# Patient Record
Sex: MALE | Race: WHITE | Employment: OTHER | URBAN - METROPOLITAN AREA
[De-identification: names, ages, dates, MRNs, and addresses within clinical notes are randomized per-mention and may not be internally consistent; named-entity substitution may affect disease eponyms.]

---

## 2017-01-04 ENCOUNTER — ASSISTED LIVING VISIT (OUTPATIENT)
Dept: GERIATRICS | Facility: CLINIC | Age: 77
End: 2017-01-04
Payer: COMMERCIAL

## 2017-01-04 VITALS
RESPIRATION RATE: 18 BRPM | DIASTOLIC BLOOD PRESSURE: 67 MMHG | WEIGHT: 171 LBS | OXYGEN SATURATION: 98 % | TEMPERATURE: 98.5 F | SYSTOLIC BLOOD PRESSURE: 126 MMHG | HEART RATE: 64 BPM

## 2017-01-04 DIAGNOSIS — G30.8 ALZHEIMER'S DISEASE OF OTHER ONSET WITHOUT BEHAVIORAL DISTURBANCE: ICD-10-CM

## 2017-01-04 DIAGNOSIS — Z71.89 ENCOUNTER FOR HOME SAFETY REVIEW FOR INJURY PREVENTION: ICD-10-CM

## 2017-01-04 DIAGNOSIS — E89.0 POSTABLATIVE HYPOTHYROIDISM: ICD-10-CM

## 2017-01-04 DIAGNOSIS — F02.80 ALZHEIMER'S DISEASE OF OTHER ONSET WITHOUT BEHAVIORAL DISTURBANCE: ICD-10-CM

## 2017-01-04 DIAGNOSIS — R41.89 COGNITIVE IMPAIRMENT: Primary | ICD-10-CM

## 2017-01-04 PROCEDURE — 99207 ZZC CDG-CORRECTLY CODED, REVIEWED AND AGREE: CPT | Performed by: NURSE PRACTITIONER

## 2017-01-04 RX ORDER — MULTIVIT-MIN/FA/LYCOPEN/LUTEIN .4-300-25
1 TABLET ORAL AT BEDTIME
COMMUNITY
End: 2017-11-17

## 2017-01-04 NOTE — PROGRESS NOTES
[facility]    Chief Complaint   Patient presents with     RECHECK     Face to Face       HPI:    Elie Marcano is a 76 year old  (1940), who is being seen today for an episodic care visit at [facility]. Today's concern is:  Increased cognitive impairment, home safety  Nurses report increased confusion and concerns for need for memory care. Nurses report that he placed his underwear on a light bulb to dry and his underwear started to burn. Nurses requesting further cognitive evaluation for possible memory care placement.    Alzheimer's disease of other onset without behavioral disturbance  He resides in Assisted Living.He denies sleep disturbances. He has adjusted well to Assisted Living. Nurses completed SLUMS on admission 11/30.    Postablative hypothyroid  He has a history of Graves disease with a multinodular toxic goiter. He was previously treated with Methimazole and clinically euthyroid  He was treated with I-131 therapy by Dr Harris in [place]. He was due for follow up with Endocrinology prior to his move to [place]. Post ablative Hypothyroid. Levothyroxine increased to 100 mcg for continued elevated TSH. TSH has slowly improved. TSH 11.77 12/20/16.    ALLERGIES: Review of patient's allergies indicates no known allergies.  Past Medical, Surgical, Family and Social History reviewed and updated in [facility].    Current Outpatient Prescriptions   Medication Sig Dispense Refill     Multiple Vitamins-Minerals (CEROVITE SENIOR) TABS Take 1 tablet by mouth At Bedtime       levothyroxine (SYNTHROID/LEVOTHROID) 100 MCG tablet Take 1 tablet (100 mcg) by mouth daily 31 tablet PRN     tamsulosin (FLOMAX) 0.4 MG 24 hr capsule Take 1 capsule (0.4 mg) by mouth daily 31 capsule PRN     travoprost, NELSON Free, (TRAVATAN Z) 0.004 % opthalmic solution Place 1 drop into both eyes At Bedtime 1 Bottle 11     vitamin D (ERGOCALCIFEROL) 50906 UNIT capsule TAKE 1 CAPSULE BY MOUTH TWICE WEEKLY 8 capsule  11     donepezil (ARICEPT) 10 MG tablet Take 1 tablet (10 mg) by mouth daily 31 tablet PRN     Medications reviewed:  Medications reconciled to facility chart and changes were made to reflect current medications as identified as above med list. Below are the changes that were made:   Medications stopped since last EPIC medication reconciliation:   There are no discontinued medications.    Medications started since last [facility] medication reconciliation:  No orders of the defined types were placed in this encounter.      REVIEW OF SYSTEMS:  4 point ROS of systems including Respiratory, Cardiovascular, Gastroenterology, Genitourinary, were all negative except for pertinent positives noted in my HPI.    Physical Exam:  /67 mmHg  Pulse 64  Temp(Src) 98.5  F (36.9  C)  Resp 18  Wt 171 lb (77.565 kg)  SpO2 98%  GENERAL APPEARANCE:  Alert, in no distress  RESP:  respiratory effort and palpation of chest normal, lungs clear to auscultation , no respiratory distress  CV:  Palpation and auscultation of heart done , regular rate and rhythm, no murmur, rub, or gallop, no edema.  M/S:   Gait and station normal, ambulating independently.  SKIN:  Inspection of skin and subcutaneous tissue baseline, Palpation of skin and subcutaneous tissue baseline.  PSYCH:  memory impaired , affect and mood normal    Recent Labs:      Last Basic Metabolic Panel:  Recent Labs   Lab Test 11/01/16   NA  136  136   POTASSIUM  3.6  3.6   CHLORIDE  100  100   RONALD  8.5  8.5   BUN  20  20   CR  0.91  0.91   GLC  87  87       Liver Function Studies -   Recent Labs   Lab Test 11/03/16   PROTTOTAL  6.3*   ALBUMIN  3.0  3.0*   BILITOTAL  0.5  0.5   ALKPHOS  68  68   AST  19  19   ALT  29  29       TSH   Date Value Ref Range Status   12/20/2016 11.77* 0.40 - 4.00 mU/L Final   12/01/2016 17.26* 0.40 - 4.00 mcU/mL Final         Assessment/Plan:  Increased cognitive impairment with concerns for home safety  Refer Home health services for  PT/OT for cognitive evaluation. Consider memory care unit.    Alzheimer's disease of other onset without behavioral disturbance  Resides in Assisted living continue with Donepezil. Continue with medication management and meals.    Postablative hypothyroid   Continue Levothyroxine 100 mcg daily. Follow up TSH.           Orders:  Recheck TSH in one month    Electronically signed by  STAS Lott CNP          Documentation of Face-to-Face and Certification for Home Health Services     Patient: Elie Marcano   YOB: 1940  MR Number: [id number]  Today's Date: 1/4/2017    I certify that patient: Elie Marcano is under my care and that I, or a nurse practitioner or physician's assistant working with me, had a face-to-face encounter that meets the physician face-to-face encounter requirements with this patient on: 1/4/2017.    This encounter with the patient was in whole, or in part, for the following medical condition, which is the primary reason for home health care: RN, OT for Cognitive assessment..    I certify that, based on my findings, the following services are medically necessary home health services: Physical therapy and Occupational Therapy.    My clinical findings support the need for the above services because: Physical Therapy Services are needed to assess and treat the following functional impairments: cognition and assistance with ADL's.    Further, I certify that my clinical findings support that this patient is homebound (i.e. absences from home require considerable and taxing effort and are for medical reasons or [employer] services or infrequently or of short duration when for other reasons) because: Requires assistance of another person or specialized equipment to access medical services because patient: Is prone to wander/get lost without assistance...    Based on the above findings. I certify that this patient is confined to the home and needs intermittent skilled nursing  care, physical therapy and/or speech therapy.  The patient is under my care, and I have initiated the establishment of the plan of care.  This patient will be followed by a physician who will periodically review the plan of care.  Physician/Provider to provide follow up care: Nickie Iverson    Responsible Medicare certified PECOS Physician:   Physician Signature: See electronic signature associated with these discharge orders.  Date: 1/4/2017

## 2017-01-24 ENCOUNTER — TRANSFERRED RECORDS (OUTPATIENT)
Dept: HEALTH INFORMATION MANAGEMENT | Facility: CLINIC | Age: 77
End: 2017-01-24

## 2017-01-24 LAB — TSH SERPL-ACNC: 0.5 MU/L (ref 0.4–4)

## 2017-02-02 ENCOUNTER — TRANSFERRED RECORDS (OUTPATIENT)
Dept: HEALTH INFORMATION MANAGEMENT | Facility: CLINIC | Age: 77
End: 2017-02-02

## 2017-02-02 LAB — TSH SERPL-ACNC: 0.44 MU/L (ref 0.4–4)

## 2017-03-08 ENCOUNTER — ASSISTED LIVING VISIT (OUTPATIENT)
Dept: GERIATRICS | Facility: CLINIC | Age: 77
End: 2017-03-08
Payer: COMMERCIAL

## 2017-03-08 VITALS
DIASTOLIC BLOOD PRESSURE: 77 MMHG | OXYGEN SATURATION: 97 % | HEART RATE: 71 BPM | RESPIRATION RATE: 20 BRPM | SYSTOLIC BLOOD PRESSURE: 148 MMHG | WEIGHT: 170.4 LBS

## 2017-03-08 DIAGNOSIS — G30.8 ALZHEIMER'S DISEASE OF OTHER ONSET WITHOUT BEHAVIORAL DISTURBANCE: Primary | ICD-10-CM

## 2017-03-08 DIAGNOSIS — F02.80 ALZHEIMER'S DISEASE OF OTHER ONSET WITHOUT BEHAVIORAL DISTURBANCE: Primary | ICD-10-CM

## 2017-03-08 DIAGNOSIS — E89.0 POSTABLATIVE HYPOTHYROIDISM: ICD-10-CM

## 2017-03-08 DIAGNOSIS — E55.9 VITAMIN D DEFICIENCY: ICD-10-CM

## 2017-03-08 PROCEDURE — 99207 ZZC CDG-DOWN CODE MED NECESSITY: CPT | Performed by: NURSE PRACTITIONER

## 2017-03-08 NOTE — PROGRESS NOTES
[facility]    Chief Complaint   Patient presents with     RECHECK     Routine        HPI:    Elie Marcano is a 76 year old  (1940), who is being seen today for an episodic care visit at [facility]. Today's concern is:  Alzheimer's disease of other onset without behavioral disturbance  He underwent a comprehensive cognitive assessment with OT due to increased confusion with functional and cognitive decline. CPT 3.9/5.6 OT indicating moderate cognitive decline, requiring 24 hour care. He denies sleep disturbances. He has been moved to memory care He has adjusted well to Memory care. Nurses completed SLUMS on admission 11/30. He requires additional assistance with hygiene.    Postablative hypothyroid  He has a history of Graves disease with a multinodular toxic goiter. He was previously treated with Methimazole and clinically euthyroid  He was treated with I-131 therapy by Dr Harris in [place]. He was due for follow up with Endocrinology prior to his move to [place]. Post ablative Hypothyroid. Levothyroxine increased to 150 mcg daily. Last TSH 0.44 2/2/17.    Vitamin D deficiency  He has a history of vitamin D deficiency. He is currently managed on Ergocalciferol 50,000 iu po twice weekly. Last Vitamin D level 35 10/20/16.    ALLERGIES: Review of patient's allergies indicates no known allergies.  Past Medical, Surgical, Family and Social History reviewed and updated in [facility].    Current Outpatient Prescriptions   Medication Sig Dispense Refill     LEVOTHYROXINE SODIUM PO Take 150 mcg by mouth daily In evening 8pm       Multiple Vitamins-Minerals (CEROVITE SENIOR) TABS Take 1 tablet by mouth At Bedtime       tamsulosin (FLOMAX) 0.4 MG 24 hr capsule Take 1 capsule (0.4 mg) by mouth daily 31 capsule PRN     travoprost, NELSON Free, (TRAVATAN Z) 0.004 % opthalmic solution Place 1 drop into both eyes At Bedtime 1 Bottle 11     vitamin D (ERGOCALCIFEROL) 90367 UNIT capsule TAKE 1 CAPSULE BY  MOUTH TWICE WEEKLY 8 capsule 11     donepezil (ARICEPT) 10 MG tablet Take 1 tablet (10 mg) by mouth daily 31 tablet PRN     Medications reviewed:  Medications reconciled to facility chart and changes were made to reflect current medications as identified as above med list. Below are the changes that were made:   Medications stopped since last EPIC medication reconciliation:   There are no discontinued medications.    Medications started since last [facility] medication reconciliation:  No orders of the defined types were placed in this encounter.      REVIEW OF SYSTEMS:  4 point ROS of systems including Respiratory, Cardiovascular, Gastroenterology, Genitourinary, were all negative except for pertinent positives noted in my HPI.    Physical Exam:  /77  Pulse 71  Resp 20  Wt 170 lb 6.4 oz (77.3 kg)  SpO2 97%  GENERAL APPEARANCE:  Alert, in no distress  RESP:  respiratory effort and palpation of chest normal, lungs clear to auscultation , no respiratory distress  CV:  Palpation and auscultation of heart done , regular rate and rhythm, no murmur, rub, or gallop, no edema.  M/S:   Gait and station normal, ambulating independently.  SKIN:  Inspection of skin and subcutaneous tissue baseline, Palpation of skin and subcutaneous tissue baseline.  PSYCH:  memory impaired , affect and mood normal    Recent Labs:      Last Basic Metabolic Panel:  Recent Labs   Lab Test 11/01/16   NA  136  136   POTASSIUM  3.6  3.6   CHLORIDE  100  100   RONALD  8.5  8.5   BUN  20  20   CR  0.91  0.91   GLC  87  87       Liver Function Studies -   Recent Labs   Lab Test 11/03/16   PROTTOTAL  6.3*   ALBUMIN  3.0  3.0*   BILITOTAL  0.5  0.5   ALKPHOS  68  68   AST  19  19   ALT  29  29       TSH   Date Value Ref Range Status   02/02/2017 0.44 0.40 - 4.00 mU/L Final   01/24/2017 0.50 0.40 - 4.00 mU/L Final         Assessment/Plan:  Alzheimer's disease of other onset without behavioral disturbance  Resides in Memory care. Continue with  Donepezil. Continue with medication management and meals.    Postablative hypothyroid with history of Graves disease   Continue Levothyroxine 150 mcg daily.  Continue to follow TSH.    Vitamin D Deficiency  Check vitamin D level, Continue Ergocalciferol 50,000 iu twice weekly.       POLST on file DNR/DNI    Electronically signed by  STAS Lott CNP

## 2017-03-30 ENCOUNTER — TRANSFERRED RECORDS (OUTPATIENT)
Dept: HEALTH INFORMATION MANAGEMENT | Facility: CLINIC | Age: 77
End: 2017-03-30

## 2017-04-19 ENCOUNTER — ASSISTED LIVING VISIT (OUTPATIENT)
Dept: GERIATRICS | Facility: CLINIC | Age: 77
End: 2017-04-19
Payer: COMMERCIAL

## 2017-04-19 VITALS
TEMPERATURE: 97.2 F | RESPIRATION RATE: 21 BRPM | OXYGEN SATURATION: 98 % | WEIGHT: 170.8 LBS | SYSTOLIC BLOOD PRESSURE: 138 MMHG | DIASTOLIC BLOOD PRESSURE: 72 MMHG | HEART RATE: 57 BPM

## 2017-04-19 DIAGNOSIS — N40.0 BENIGN PROSTATIC HYPERPLASIA WITHOUT LOWER URINARY TRACT SYMPTOMS, UNSPECIFIED MORPHOLOGY: ICD-10-CM

## 2017-04-19 DIAGNOSIS — E55.9 VITAMIN D DEFICIENCY: ICD-10-CM

## 2017-04-19 DIAGNOSIS — G30.8 ALZHEIMER'S DISEASE OF OTHER ONSET WITHOUT BEHAVIORAL DISTURBANCE: ICD-10-CM

## 2017-04-19 DIAGNOSIS — F02.80 ALZHEIMER'S DISEASE OF OTHER ONSET WITHOUT BEHAVIORAL DISTURBANCE: ICD-10-CM

## 2017-04-19 DIAGNOSIS — I10 BENIGN ESSENTIAL HYPERTENSION: ICD-10-CM

## 2017-04-19 DIAGNOSIS — E89.0 POSTABLATIVE HYPOTHYROIDISM: Primary | ICD-10-CM

## 2017-04-19 PROCEDURE — 99207 ZZC CDG-CORRECTLY CODED, REVIEWED AND AGREE: CPT | Performed by: INTERNAL MEDICINE

## 2017-04-19 RX ORDER — LATANOPROST 50 UG/ML
1 SOLUTION/ DROPS OPHTHALMIC AT BEDTIME
COMMUNITY
End: 2018-05-29

## 2017-04-19 NOTE — PROGRESS NOTES
"Elie Marcano is a 76 year old male seen April 19, 2017 at [facility] unit where he has resided for 9 months (admit 7/2016) seen to follow up worsening dementia, now moved from [place] to Memory Care.   Patient is seen on the unit, pleasant and conversational.   States \"there is nothing wrong with me\"    Denies pain, eats/sleeps well.   No GI symptoms.       Patient has a h/o Graves' disease with toxic multinodular goiter, treated with methimazole and I-131 tx 5/2016 in [place].   Patient reports he sleeps okay.  No dizziness.  He has gained weight since AL admission.      Patient was worked up for weight loss, recently found to have mild elevation of transaminases   He denies any GI symptoms, no pain.    Also followed for cognitive decline and is on donepezil.  Patient doesn't feel he has much trouble with his memory, even though he is unable to answer most questions regarding his history.    He has moved to the Memory Care unit and seems to be doing well there.        Past Medical History   Diagnosis Date     Hypertension      Cognitive impairment      Vitamin D deficiency      Alzheimer disease      Hyperlipidemia      BPH (benign prostatic hyperplasia)      Elevated PSA      Syncope      Thyroid disease      Graves disease, Multinodular goiter  S/p I-131 ablation, 2016     Weight loss      Chronic sinusitis        SH:  Single, no children.   His friend Shayla Mitchell is his primary contact  Patient reports he was raised [place] farm in [place], has one brother.   Worked as a .     ROS: limited but negative other than above.     EXAM:  Pleasant, NAD  /72  Pulse 57  Temp 97.2  F (36.2  C)  Resp 21  Wt 170 lb 12.8 oz (77.5 kg)  SpO2 98%   Neck supple without adenopathy  Lungs clear bilaterally with fair air movement.   Heart RRR s1s2 without ectopy, 1/6 APRIL  Abd soft, NT, no distention, +BS  Ext without edema  Neuro: ambulatory without device, no focal deficits, no tremor or " stiffness  Psych: affect okay    TSH   Date Value Ref Range Status   02/02/2017 0.44 0.40 - 4.00 mU/L Final     Lab Results   Component Value Date    AST 19 11/03/2016    AST 19 11/03/2016     Lab Results   Component Value Date    ALT 29 11/03/2016    ALT 29 11/03/2016     Lab Results   Component Value Date    BILITOTAL 0.5 11/03/2016    BILITOTAL 0.5 11/03/2016     Lab Results   Component Value Date    ALBUMIN 3.0 11/03/2016    ALBUMIN 3.0 11/03/2016     Lab Results   Component Value Date    PROTTOTAL 6.3 11/03/2016      Lab Results   Component Value Date    ALKPHOS 68 11/03/2016    ALKPHOS 68 11/03/2016   Last Basic Metabolic Panel:  Lab Results   Component Value Date     11/01/2016     11/01/2016      Lab Results   Component Value Date    POTASSIUM 3.6 11/01/2016    POTASSIUM 3.6 11/01/2016     Lab Results   Component Value Date    CHLORIDE 100 11/01/2016    CHLORIDE 100 11/01/2016     Lab Results   Component Value Date    RONALD 8.5 11/01/2016    RONALD 8.5 11/01/2016     No results found for: CO2  Lab Results   Component Value Date    BUN 20 11/01/2016    BUN 20 11/01/2016     Lab Results   Component Value Date    CR 0.91 11/01/2016    CR 0.91 11/01/2016     Lab Results   Component Value Date    GLC 87 11/01/2016    GLC 87 11/01/2016   Vit D level 35      IMP/PLAN:  (E89.0) Postablative hypothyroidism   Comment: s/p I-131 tx in May 2016  Plan: Levothyroxine started for high TSH, now very low.   Will decrease levothyroxine to 0.125 mg/day and follow TSH       (G30.8,  F02.80) Alzheimer's disease of other onset without behavioral disturbance  Comment: Disoriented, loss of functional status.   Plan: continue donepezil, no reported SEs.   [facility] Memory Care support for assist with meds, meals, activity    (I10) Benign essential hypertension  Comment: bps remain good after d/c of atenolol  Plan:  follow    (N40.0) Benign prostatic hyperplasia without lower urinary tract symptoms, unspecified morphology  Comment:  no current sx  Plan: continue Flomax, follow     (E55.9) Vitamin D deficiency  Comment: now on replacement  Plan: same regimen    (R74.0) Elevation of level of transaminase or lactic acid dehydrogenase (LDH)  Comment: resolved     Sharri Dennis MD

## 2017-04-26 DIAGNOSIS — E03.9 HYPOTHYROIDISM, UNSPECIFIED TYPE: Primary | ICD-10-CM

## 2017-04-28 RX ORDER — LEVOTHYROXINE SODIUM 125 UG/1
125 TABLET ORAL DAILY
Qty: 31 TABLET | Status: SHIPPED | OUTPATIENT
Start: 2017-04-28 | End: 2017-06-21

## 2017-05-10 ENCOUNTER — ASSISTED LIVING VISIT (OUTPATIENT)
Dept: GERIATRICS | Facility: CLINIC | Age: 77
End: 2017-05-10
Payer: COMMERCIAL

## 2017-05-10 VITALS
TEMPERATURE: 96.6 F | SYSTOLIC BLOOD PRESSURE: 131 MMHG | WEIGHT: 169.8 LBS | RESPIRATION RATE: 20 BRPM | DIASTOLIC BLOOD PRESSURE: 70 MMHG | OXYGEN SATURATION: 98 % | HEART RATE: 62 BPM

## 2017-05-10 DIAGNOSIS — E89.0 POSTABLATIVE HYPOTHYROIDISM: ICD-10-CM

## 2017-05-10 DIAGNOSIS — N40.0 BENIGN PROSTATIC HYPERPLASIA WITHOUT LOWER URINARY TRACT SYMPTOMS, UNSPECIFIED MORPHOLOGY: ICD-10-CM

## 2017-05-10 DIAGNOSIS — J30.2 SEASONAL ALLERGIC RHINITIS, UNSPECIFIED ALLERGIC RHINITIS TRIGGER: Primary | ICD-10-CM

## 2017-05-10 DIAGNOSIS — F02.80 ALZHEIMER'S DISEASE OF OTHER ONSET WITHOUT BEHAVIORAL DISTURBANCE: ICD-10-CM

## 2017-05-10 DIAGNOSIS — E55.9 VITAMIN D DEFICIENCY: ICD-10-CM

## 2017-05-10 DIAGNOSIS — G30.8 ALZHEIMER'S DISEASE OF OTHER ONSET WITHOUT BEHAVIORAL DISTURBANCE: ICD-10-CM

## 2017-05-10 DIAGNOSIS — I10 BENIGN ESSENTIAL HYPERTENSION: ICD-10-CM

## 2017-05-10 PROCEDURE — 99207 ZZC CDG-CORRECTLY CODED, REVIEWED AND AGREE: CPT | Performed by: NURSE PRACTITIONER

## 2017-05-10 NOTE — PROGRESS NOTES
"[facility]    Chief Complaint   Patient presents with     RECHECK     ROUTINE       HPI:    Elie Marcano is a 76 year old  (1940) male with Alzheimer's Dementia, who is being seen today for an episodic care visit at [facility]. Has lived in [place] since July 2016, he moved from [place].     Meaningful hx from resident is limited due to cognitive impairment. Staff assist in providing history.    Today's concerns care:    (J30.2) Seasonal allergic rhinitis  Complains of stuffy and runny nose. Started recently. Thinks he may have taken allergy medication in the past. No itchy or watery eyes.     Alzheimer's disease of other onset without behavioral disturbance  CPT 3.9/5.6 OT, SLUMS on admission 11/30. Currently maintained on donepezil 10 mg daily. Moved to memory care due cognitive and functional decline. Needs assist for ADLs and medications.No apparent neuropsychiatric symptoms, mood stable. Ambulates freely on unit without assistive device. Weight stable 170#. Feels his memory is \"pretty good\". Feels spirits at \"good\" and sleeps well at night.    Postablative hypothyroid  History of Graves Disease with a multinodular toxic goiter. Previously treated with methimazole and I-131 therapy prior to moving to [place] (May 2016). Endocrinologist in [place] was Dr. Harris. Currently on Levothyroxine 125 mcg daily since 4/27/17.    (I10) Hypertension  Atenolol was stopped due to low blood pressure.  BP and HR last two visit reviewed: 138/72, HR 57; 148/77, 71.  Denies chest pain, palpitations, dizziness, headache.    (N40.0) BPH  No dysuria. Feels he is emptying bladder. Endorses leaking urine at times \"seldom\". Currently on Flomax.    (E55.9) Vitamin D deficiency  Last Vitamin D level 35 10/20/16.  Current medication: Ergocalciferol 50,000 iu PO twice weekly.    ALLERGIES: Review of patient's allergies indicates no known allergies.  Past Medical, Surgical, Family and " Social History reviewed and updated in [facility].    Current Outpatient Prescriptions   Medication Sig Dispense Refill     FLUTICASONE PROPIONATE, NASAL, NA Spray 2 sprays in nostril daily X 1 week, then 1 spray daily. For Rhinitis       levothyroxine (SYNTHROID/LEVOTHROID) 125 MCG tablet Take 1 tablet (125 mcg) by mouth daily 31 tablet PRN     latanoprost (XALATAN) 0.005 % ophthalmic solution Place 1 drop into both eyes At Bedtime       Multiple Vitamins-Minerals (CEROVITE SENIOR) TABS Take 1 tablet by mouth At Bedtime       tamsulosin (FLOMAX) 0.4 MG 24 hr capsule Take 1 capsule (0.4 mg) by mouth daily 31 capsule PRN     vitamin D (ERGOCALCIFEROL) 90082 UNIT capsule TAKE 1 CAPSULE BY MOUTH TWICE WEEKLY 8 capsule 11     donepezil (ARICEPT) 10 MG tablet Take 1 tablet (10 mg) by mouth daily 31 tablet PRN     Medications reviewed:  Medications reconciled to facility chart and changes were made to reflect current medications as identified as above med list. Below are the changes that were made:   Medications stopped since last EPIC medication reconciliation:   There are no discontinued medications.    Medications started since last [facility] medication reconciliation:  No orders of the defined types were placed in this encounter.    Social Hx: Names POA as friend Shayla Mitchell. Describes time in [employer], was not overseas stationed in [place].    REVIEW OF SYSTEMS:  4 point ROS of systems including Respiratory, Cardiovascular, Gastroenterology, Genitourinary, were all negative except for pertinent positives noted in my HPI.    Physical Exam:  /70  Pulse 62  Temp 96.6  F (35.9  C)  Resp 20  Wt 169 lb 12.8 oz (77 kg)  SpO2 98%  GENERAL APPEARANCE:  Alert, in no distress, clean and well dressed in button down shirt  HEENT: good dentition, MMM w/o exudate, conjunctiva w/o injection, no drainage  RESP:  respiratory effort and palpation of chest normal, lungs clear to auscultation , no respiratory distress  CV:  Palpation and  auscultation of heart done , regular rate and rhythm, no murmur, rub, or gallop  PV: no edema, calves are supple and non-tender  GI: abd is soft, non-tender, non-distended, + BS  M/S:   Gait and station normal, ambulating independently without assistive device. 5/5 biceps strength.   SKIN:  No visible rashes, lesions or ulcerations. Skin without increased warmth or tenderness.  Neuro: CN II-XII grossly intact, no tremor, normal tone  PSYCH:  memory impaired, oriented to self only, speech fluent, judgment impaired, affect and mood normal    Recent Labs:      Last Basic Metabolic Panel:  Recent Labs   Lab Test 11/01/16   NA  136  136   POTASSIUM  3.6  3.6   CHLORIDE  100  100   RONALD  8.5  8.5   BUN  20  20   CR  0.91  0.91   GLC  87  87       Liver Function Studies -   Recent Labs   Lab Test 11/03/16   PROTTOTAL  6.3*   ALBUMIN  3.0  3.0*   BILITOTAL  0.5  0.5   ALKPHOS  68  68   AST  19  19   ALT  29  29       TSH   Date Value Ref Range Status   02/02/2017 0.44 0.40 - 4.00 mU/L Final   01/24/2017 0.50 0.40 - 4.00 mU/L Final     Vitamin D level 35    Assessment/Plan:    (J30.2) Seasonal allergic rhinitis  Comment: c/o runny nose, started recently with season change  Plan:   - Order Flonase BID x1 week then daily   - reassess symptoms at next visit     Alzheimer's disease of other onset without behavioral disturbance  Comment: SLUMS 11/30. Now on memory care unit due to functional and cognitive decline. Expect progressive decline as per disease process.  Plan:   - continue supportive care in current environment for medication administration, meals, ADL assistance, socialization, safety.  - Continue donepezil, monitor for adverse effects - none at present    Postablative hypothyroid  Comment: Graves Diease, Treated with methimazole and I-131 in May 2016. Last TSH 0.44 on 2/2/17.  Plan:   - synthroid was decreased on 4/27/17 to 0.125 mcg/day   - order TSH six weeks post dose change on 06/08/17     (I10)  Hypertension  Comment: atenolol stopped, BP today 131/70  Plan:   - BP at goal for age off medications  - continue interval monitoring     (N40.0) BPH  Comment: no symptoms on Flomax  Plan:  - continue Flomax and monitor clinically     (E55.9) Vitamin D deficiency  Comment: On 50,000 iu po twice weekly. Last Vitamin D level 35 10/20/16.  Plan:   - consider lowing dose to maintainance dosing in next few months    Electronically signed by  STAS Ziegler, CNP

## 2017-05-18 ENCOUNTER — TRANSFERRED RECORDS (OUTPATIENT)
Dept: HEALTH INFORMATION MANAGEMENT | Facility: CLINIC | Age: 77
End: 2017-05-18

## 2017-05-18 LAB — TSH SERPL-ACNC: 0.42 MU/L (ref 0.4–4)

## 2017-06-08 ENCOUNTER — TRANSFERRED RECORDS (OUTPATIENT)
Dept: HEALTH INFORMATION MANAGEMENT | Facility: CLINIC | Age: 77
End: 2017-06-08

## 2017-06-08 LAB — TSH SERPL-ACNC: 0.71 MU/L (ref 0.4–4)

## 2017-06-21 ENCOUNTER — ASSISTED LIVING VISIT (OUTPATIENT)
Dept: GERIATRICS | Facility: CLINIC | Age: 77
End: 2017-06-21
Payer: COMMERCIAL

## 2017-06-21 VITALS
TEMPERATURE: 97 F | SYSTOLIC BLOOD PRESSURE: 118 MMHG | DIASTOLIC BLOOD PRESSURE: 67 MMHG | OXYGEN SATURATION: 99 % | RESPIRATION RATE: 19 BRPM | WEIGHT: 173.4 LBS | HEART RATE: 59 BPM

## 2017-06-21 DIAGNOSIS — E55.9 VITAMIN D DEFICIENCY: ICD-10-CM

## 2017-06-21 DIAGNOSIS — G30.8 ALZHEIMER'S DISEASE OF OTHER ONSET WITHOUT BEHAVIORAL DISTURBANCE: ICD-10-CM

## 2017-06-21 DIAGNOSIS — E89.0 POSTABLATIVE HYPOTHYROIDISM: ICD-10-CM

## 2017-06-21 DIAGNOSIS — R21 RASH: Primary | ICD-10-CM

## 2017-06-21 DIAGNOSIS — F02.80 ALZHEIMER'S DISEASE OF OTHER ONSET WITHOUT BEHAVIORAL DISTURBANCE: ICD-10-CM

## 2017-06-21 NOTE — PROGRESS NOTES
[place] [facility]    Chief Complaint   Patient presents with     RECHECK     Rash       HPI:    Elie Marcano is a 76 year old  (1940), who is being seen today for an episodic care visit at [facility].  HPI information obtained from: facility staff and [facility] chart review.Today's concern is:  Rash  Nurses requesting a visit today for a rash to chest. He denies itching or discomfort.    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 He was previously treated with Methimazole and 1-131 therapy prior to moving to [place] in May of 2016. He was followed by an Endocrinologist while living in [place].He is currently managed on Levothyroxine 112 mcg daily. He dose was decreased from 125 mcg daily. He denies fatigue.    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 He resides in memory care. No reports of mood or behavior disturbances by nursing staff.He is currently managed on Donepezil 10 mg daily. No sleep disturbances reported. SLUMS on admission was 11/30. Nurses report good appetite with no weight loss. He ambulates independently.    Vitamin D deficiency  History of vitamin D deficiency He is currently managed on Ergocalciferol 50,000 iu twice weekly. His previous Vtamin D level 10/20/16 was 35.      ALLERGIES: Review of patient's allergies indicates no known allergies.  Past Medical, Surgical, Family and Social History reviewed and updated in [facility].    Current Outpatient Prescriptions   Medication Sig Dispense Refill     LEVOTHYROXINE SODIUM PO Take 112 mcg by mouth At Bedtime       FLUTICASONE PROPIONATE, NASAL, NA Spray 1 spray in nostril daily 1 spray daily. For Rhinitis       latanoprost (XALATAN) 0.005 % ophthalmic solution Place 1 drop into both eyes At Bedtime       Multiple Vitamins-Minerals (CEROVITE SENIOR) TABS Take 1 tablet by mouth At Bedtime       tamsulosin (FLOMAX) 0.4 MG 24 hr  capsule Take 1 capsule (0.4 mg) by mouth daily 31 capsule PRN     vitamin D (ERGOCALCIFEROL) 46990 UNIT capsule TAKE 1 CAPSULE BY MOUTH TWICE WEEKLY 8 capsule 11     donepezil (ARICEPT) 10 MG tablet Take 1 tablet (10 mg) by mouth daily 31 tablet PRN     Medications reviewed:  Medications reconciled to facility chart and changes were made to reflect current medications as identified as above med list. Below are the changes that were made:   Medications stopped since last EPIC medication reconciliation:   Medications Discontinued During This Encounter   Medication Reason     levothyroxine (SYNTHROID/LEVOTHROID) 125 MCG tablet Medication Reconciliation Clean Up       Medications started since last [facility] medication reconciliation:  Orders Placed This Encounter   Medications     LEVOTHYROXINE SODIUM PO     Sig: Take 112 mcg by mouth At Bedtime       REVIEW OF SYSTEMS:  10 point ROS of systems including Constitutional, Eyes, Respiratory, Cardiovascular, Gastroenterology, Genitourinary, Integumentary, Muscularskeletal, Psychiatric were all negative except for pertinent positives noted in my HPI.    Physical Exam:  /67  Pulse 59  Temp 97  F (36.1  C)  Resp 19  Wt 173 lb 6.4 oz (78.7 kg)  SpO2 99%  GENERAL APPEARANCE:  Alert, in no distress  RESP:  respiratory effort and palpation of chest normal, lungs clear to auscultation , no respiratory distress  CV:  Palpation and auscultation of heart done , regular rate and rhythm, no murmur, rub, or gallop, no edema  ABDOMEN:  normal bowel sounds, soft, nontender, no hepatosplenomegaly or other masses  M/S:   Gait and station normal  SKIN:  Inspection of skin and subcutaneous tissuefaint erythematous papualar rash to anterior rash  PSYCH:  memory impaired , affect and mood normal    Recent Labs:    CBC RESULTS: No results for input(s): WBC, RBC, HGB, HCT, MCV, MCH, MCHC, RDW, PLT in the last 26095 hours.    Last Basic Metabolic Panel:  Recent Labs   Lab Test 11/01/16   NA   136  136   POTASSIUM  3.6  3.6   CHLORIDE  100  100   RONALD  8.5  8.5   BUN  20  20   CR  0.91  0.91   GLC  87  87       Liver Function Studies -   Recent Labs   Lab Test 11/03/16   PROTTOTAL  6.3*   ALBUMIN  3.0  3.0*   BILITOTAL  0.5  0.5   ALKPHOS  68  68   AST  19  19   ALT  29  29       TSH   Date Value Ref Range Status   06/08/2017 0.71 0.40 - 4.00 mU/L Final   05/18/2017 0.42 0.40 - 4.00 mU/L Final         Assessment/Plan:  Rash  Sarna lotion apply to rash BID until cleared    Postablative hypothyroidism  Decrease Levothyroxine to 112 mcg daily. 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 Continue Donepizil.    Vitamin D deficiency  Continue Ergocalciferol, recheck Vitamin D level      Orders:  Labs in 6 weeks, TSH,BMP,CBC, Vitamin D level    POLST on file DNR/DNI    Electronically signed by  Nickie Iverson NP

## 2017-07-12 ENCOUNTER — ASSISTED LIVING VISIT (OUTPATIENT)
Dept: GERIATRICS | Facility: CLINIC | Age: 77
End: 2017-07-12
Payer: COMMERCIAL

## 2017-07-12 VITALS
DIASTOLIC BLOOD PRESSURE: 73 MMHG | HEART RATE: 66 BPM | SYSTOLIC BLOOD PRESSURE: 173 MMHG | TEMPERATURE: 96.6 F | OXYGEN SATURATION: 99 % | WEIGHT: 164 LBS | RESPIRATION RATE: 20 BRPM

## 2017-07-12 DIAGNOSIS — F02.818 LATE ONSET ALZHEIMER'S DISEASE WITH BEHAVIORAL DISTURBANCE (H): ICD-10-CM

## 2017-07-12 DIAGNOSIS — E89.0 POSTABLATIVE HYPOTHYROIDISM: ICD-10-CM

## 2017-07-12 DIAGNOSIS — I10 BENIGN ESSENTIAL HYPERTENSION: Primary | ICD-10-CM

## 2017-07-12 DIAGNOSIS — G30.1 LATE ONSET ALZHEIMER'S DISEASE WITH BEHAVIORAL DISTURBANCE (H): ICD-10-CM

## 2017-07-12 DIAGNOSIS — E55.9 VITAMIN D DEFICIENCY: ICD-10-CM

## 2017-07-12 PROCEDURE — 99207 ZZC CDG-CORRECTLY CODED, REVIEWED AND AGREE: CPT | Performed by: NURSE PRACTITIONER

## 2017-07-12 NOTE — PROGRESS NOTES
[place] [facility]    Chief Complaint   Patient presents with     RECHECK     Routine -Elevated BP       HPI:    Elie Marcano is a 76 year old  (1940), who is being seen today for an episodic care visit at [facility].  HPI information obtained from: facility staff and [facility] chart review.Today's concern is:    Essential Hypertension  Nurses requesting a visit for elevated BP. Last two Blood pressures 157/73 and 173/73. He denies chest pain or shortness of breath.    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 He was previously treated with Methimazole and 1-131 therapy prior to moving to [place] in May of 2016. He was followed by an Endocrinologist while living in [place].He is currently managed on Levothyroxine 100 mcg daily. He dose was decreased from 125 mcg daily. He denies fatigue.    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 He resides in memory care. Nurses report that he has made aggressive and threatening behavior toward staff. He has been redirected without escalation of behavior.He is currently managed on Donepezil 10 mg daily. No sleep disturbances reported. SLUMS on admission was 11/30. SLUMS recently completed in one year follow up by nursing staff 7/30.He has lost 7 lbs in the past 9 months. He ambulates independently.    Vitamin D deficiency  History of vitamin D deficiency He is currently managed on Ergocalciferol 50,000 iu twice weekly. His previous Vtamin D level 10/20/16 was 35.       ALLERGIES: Review of patient's allergies indicates no known allergies.  Past Medical, Surgical, Family and Social History reviewed and updated in [facility].    Current Outpatient Prescriptions   Medication Sig Dispense Refill     LEVOTHYROXINE SODIUM PO Take 100 mcg by mouth At Bedtime        camphor-menthol (SARNA) 0.5-0.5 % LOTN Apply topically 2 times daily Until rash is cleared, then d/c.        FLUTICASONE PROPIONATE, NASAL, NA Spray 1 spray in nostril daily 1 spray daily. For Rhinitis       latanoprost (XALATAN) 0.005 % ophthalmic solution Place 1 drop into both eyes At Bedtime       Multiple Vitamins-Minerals (CEROVITE SENIOR) TABS Take 1 tablet by mouth At Bedtime       tamsulosin (FLOMAX) 0.4 MG 24 hr capsule Take 1 capsule (0.4 mg) by mouth daily 31 capsule PRN     vitamin D (ERGOCALCIFEROL) 73496 UNIT capsule TAKE 1 CAPSULE BY MOUTH TWICE WEEKLY 8 capsule 11     donepezil (ARICEPT) 10 MG tablet Take 1 tablet (10 mg) by mouth daily 31 tablet PRN     Medications reviewed:  Medications reconciled to facility chart and changes were made to reflect current medications as identified as above med list. Below are the changes that were made:   Medications stopped since last EPIC medication reconciliation:   Medications Discontinued During This Encounter   Medication Reason     levothyroxine (SYNTHROID/LEVOTHROID) 125 MCG tablet Medication Reconciliation Clean Up       Medications started since last [facility] medication reconciliation:  Orders Placed This Encounter   Medications     LEVOTHYROXINE SODIUM PO     Sig: Take 112 mcg by mouth At Bedtime       REVIEW OF SYSTEMS:  4 point ROS of systems including  Respiratory, Cardiovascular, Gastroenterology, Genitourinary were all negative except for pertinent positives noted in my HPI.    Physical Exam:  /73  Pulse 66  Temp 96.6  F (35.9  C)  Resp 20  Wt 164 lb (74.4 kg)  SpO2 99%  GENERAL APPEARANCE:  Alert, in no distress, cooperative during visit.  RESP:  respiratory effort and palpation of chest normal, lungs clear to auscultation , no respiratory distress  CV:  Palpation and auscultation of heart done , regular rate and rhythm, no murmur, rub, or gallop, no edema  M/S:   Gait and station normal, ambulates independently.  SKIN:  Inspection of skin and subcutaneous tissuefaint erythematous papualar rash to anterior rash  PSYCH:  memory impaired ,  affect and mood normal    Recent Labs:      Last Basic Metabolic Panel:  Recent Labs   Lab Test 11/01/16   NA  136  136   POTASSIUM  3.6  3.6   CHLORIDE  100  100   RONALD  8.5  8.5   BUN  20  20   CR  0.91  0.91   GLC  87  87       Liver Function Studies -   Recent Labs   Lab Test 11/03/16   PROTTOTAL  6.3*   ALBUMIN  3.0  3.0*   BILITOTAL  0.5  0.5   ALKPHOS  68  68   AST  19  19   ALT  29  29       TSH   Date Value Ref Range Status   06/08/2017 0.71 0.40 - 4.00 mU/L Final   05/18/2017 0.42 0.40 - 4.00 mU/L Final            Assessment/Plan:  Essential Hypertension  Start Lisinopril 10 mg po daily, recheck BMP in two weeks. Follow up in two weeks.    Postablative hypothyroidism  Continue Levothyroxine to 100 mcg daily. 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 Continue Donepizil. Continue to monitor mood and behaviors.    Vitamin D deficiency  Continue Ergocalciferol, check Vitamin D level      Orders:  BMP in 2 weeks    POLST on file DNR/DNI    Electronically signed by  Nickie Iverson NP

## 2017-07-18 ENCOUNTER — ASSISTED LIVING VISIT (OUTPATIENT)
Dept: GERIATRICS | Facility: CLINIC | Age: 77
End: 2017-07-18
Payer: COMMERCIAL

## 2017-07-18 VITALS
OXYGEN SATURATION: 98 % | SYSTOLIC BLOOD PRESSURE: 128 MMHG | RESPIRATION RATE: 20 BRPM | TEMPERATURE: 96.7 F | DIASTOLIC BLOOD PRESSURE: 68 MMHG | HEART RATE: 60 BPM | WEIGHT: 175 LBS

## 2017-07-18 DIAGNOSIS — I10 BENIGN ESSENTIAL HYPERTENSION: ICD-10-CM

## 2017-07-18 DIAGNOSIS — R21 RASH AND NONSPECIFIC SKIN ERUPTION: Primary | ICD-10-CM

## 2017-07-18 DIAGNOSIS — F02.80 ALZHEIMER'S DISEASE OF OTHER ONSET WITHOUT BEHAVIORAL DISTURBANCE: ICD-10-CM

## 2017-07-18 DIAGNOSIS — G30.8 ALZHEIMER'S DISEASE OF OTHER ONSET WITHOUT BEHAVIORAL DISTURBANCE: ICD-10-CM

## 2017-07-18 NOTE — PROGRESS NOTES
[place] [facility]    Chief Complaint   Patient presents with     RECHECK       HPI:    Elie Marcano is a 76 year old  (1940), who is being seen today for a recheck visit at [facility].  HPI information obtained from: facility staff and [facility] chart review.Today's concern is: BP recheck and skin rash, both improved.    Last 3 BPs:173/73, 118/67, 118/67.  Admission Weight: 171lbs  Current Weight: 175lbs    Essential Hypertension  BP improved with Lisinopril 10 per day  Plan:  Continue lisinopril 10 mg per day    Alzheimer's disease of other onset without behavioral disturbance  History of dementia. He resides in memory care. No troubling behaviors per nurses report today.  He is currently managed on Donepezil 10 mg daily. No sleep disturbances reported. SLUMS on admission was 11/30. SLUMS recently completed in one year follow up by nursing staff 7/30.He has lost 7 lbs in the past 9 months. He ambulates independently.    ALLERGIES: Review of patient's allergies indicates no known allergies.  Past Medical, Surgical, Family and Social History reviewed and updated in [facility].    Current Outpatient Prescriptions   Medication Sig Dispense Refill     DONEPEZIL HCL PO Take 10 mg by mouth At Bedtime       LISINOPRIL PO Take 10 mg by mouth daily       LEVOTHYROXINE SODIUM PO Take 100 mcg by mouth At Bedtime        camphor-menthol (SARNA) 0.5-0.5 % LOTN Apply topically 2 times daily Until rash is cleared, then d/c.       FLUTICASONE PROPIONATE, NASAL, NA Spray 1 spray in nostril daily 1 spray daily. For Rhinitis       latanoprost (XALATAN) 0.005 % ophthalmic solution Place 1 drop into both eyes At Bedtime       Multiple Vitamins-Minerals (CEROVITE SENIOR) TABS Take 1 tablet by mouth At Bedtime       tamsulosin (FLOMAX) 0.4 MG 24 hr capsule Take 1 capsule (0.4 mg) by mouth daily 31 capsule PRN     vitamin D (ERGOCALCIFEROL) 08979 UNIT capsule TAKE 1 CAPSULE BY MOUTH TWICE WEEKLY 8  capsule 11     Medications reviewed:  Medications reconciled to facility chart and changes were made to reflect current medications as identified as above med list. Below are the changes that were made:   Medications stopped since last EPIC medication reconciliation:   Medications Discontinued During This Encounter   Medication Reason     levothyroxine (SYNTHROID/LEVOTHROID) 125 MCG tablet Medication Reconciliation Clean Up       Medications started since last [facility] medication reconciliation:  Orders Placed This Encounter   Medications     LEVOTHYROXINE SODIUM PO     Sig: Take 112 mcg by mouth At Bedtime       REVIEW OF SYSTEMS:  4 point ROS of systems including  Respiratory, Cardiovascular, Gastroenterology, Genitourinary were all negative except for pertinent positives noted in my HPI.    Physical Exam:  /68  Pulse 60  Temp 96.7  F (35.9  C)  Resp 20  Wt 175 lb (79.4 kg)  SpO2 98%  GENERAL APPEARANCE:  Alert, in no distress, cooperative during visit.  RESP:  respiratory effort and palpation of chest normal, lungs clear to auscultation , no respiratory distress  CV:  auscultation of heart done , regular rate and rhythm, no murmur, rub, or gallop, no edema  M/S:   Gait and station normal, ambulates independently.  SKIN:  Inspection of skin and subcutaneous tissuefaint erythematous papualar rash to anterior chest, mostly resolved  PSYCH:  memory impaired , affect and mood normal    Recent Labs:      Last Basic Metabolic Panel:  Recent Labs   Lab Test 11/01/16   NA  136  136   POTASSIUM  3.6  3.6   CHLORIDE  100  100   RONALD  8.5  8.5   BUN  20  20   CR  0.91  0.91   GLC  87  87       Liver Function Studies -   Recent Labs   Lab Test 11/03/16   PROTTOTAL  6.3*   ALBUMIN  3.0  3.0*   BILITOTAL  0.5  0.5   ALKPHOS  68  68   AST  19  19   ALT  29  29       TSH   Date Value Ref Range Status   06/08/2017 0.71 0.40 - 4.00 mU/L Final   05/18/2017 0.42 0.40 - 4.00 mU/L Final        Assessment/Plan:  Essential Hypertension  Start Lisinopril 10 mg po daily    Alzheimer's disease of 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 Continue Donepizil. Continue to monitor mood and behaviors.    R21  Nonspecific rash  Comment:  Rash on chest almost totally resolved  Plan:  Change Sarna to BID prn    Patient Instructions   Today's instructions:    The rash on your chest is almost totally healed.  We can decrease the Sarna lotion to twice a day as needed.    POLST on file DNR/DNI    Electronically signed by  Lola Velarde CNP

## 2017-07-18 NOTE — MR AVS SNAPSHOT
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  We can decrease the Sarna lotion to twice a day as needed.          Follow-ups after your visit        Who to contact     If you have questions or need follow up information about today's clinic visit or your schedule please contact [facility] directly at [phone].  Normal or non-critical lab and imaging results will be communicated to you by [employer], letter or phone within 4 business days after the clinic has received the results. If you do not hear from us within 7 days, please contact the clinic through [employer] or phone. If you have a critical or abnormal lab result, we will notify you by phone as soon as possible.  Submit refill requests through [employer] or call your pharmacy and they will forward the refill request to us. Please allow 3 business days for your refill to be completed.          Additional Information About Your Visit        [employer] Information     [employer] lets you send messages to your doctor, view your test results, renew your prescriptions, schedule appointments and more. To sign up, go to [email or web address] . Click on \"Log in\" on the left side of the screen, which will take you to the Welcome page. Then click on \"Sign up Now\" on the right side of the page.     You will be asked to enter the access code listed below, as well as some personal information. Please follow the directions to create your username and password.     Your " access code is: [email or web address]  Expires: 10/16/2017  7:16 PM     Your access code will  in 90 days. If you need help or a new code, please call your [facility] clinic or [phone].        Care EveryWhere ID     This is your Care EveryWhere ID. This could be used by other organizations to access your [facility] medical records  [id number]        Your Vitals Were     Pulse Temperature Respirations Pulse Oximetry          60 96.7  F (35.9  C) 20 98%         Blood Pressure from Last 3 Encounters:   17 128/68   17 173/73   17 118/67    Weight from Last 3 Encounters:   17 175 lb (79.4 kg)   17 164 lb (74.4 kg)   17 173 lb 6.4 oz (78.7 kg)              Today, you had the following     No orders found for display       Primary Care Provider Office Phone # Fax #    STAS Corcoran [phone]       [facility] [address] [address]        Equal Access to Services     MELLISSA HARDWICK : Hadii aad ku hadasho Soomaali, waaxda luqadaha, qaybta kaalmada adeegyada, joshua klinein haykeysha weiss . So [facility] [phone].    ATENCIÓN: Si habla español, tiene a vargas disposición servicios gratuitos de asistencia lingüística. Ankush al [phone].    We comply with applicable federal civil rights laws and [place] laws. We do not discriminate on the basis of race, color, national origin, age, disability sex, sexual orientation or gender identity.            Thank you!     Thank you for choosing [facility]  for your care. Our goal is always to provide you with excellent care. Hearing back from our patients is one way we can continue to improve our services. Please take a few minutes to complete the written survey that you may receive in the mail after your visit with us. Thank you!             Your Updated Medication List - Protect others around you: Learn how to safely use, store and throw away your medicines at  www.disposemymeds.org.          This list is accurate as of: 7/18/17  7:16 PM.  Always use your most recent med list.                   Brand Name Dispense Instructions for use Diagnosis    CEROVITE SENIOR Tabs      Take 1 tablet by mouth At Bedtime        DONEPEZIL HCL PO      Take 10 mg by mouth At Bedtime        FLUTICASONE PROPIONATE (NASAL) NA      Spray 1 spray in nostril daily 1 spray daily. For Rhinitis        latanoprost 0.005 % ophthalmic solution    XALATAN     Place 1 drop into both eyes At Bedtime        LEVOTHYROXINE SODIUM PO      Take 100 mcg by mouth At Bedtime        LISINOPRIL PO      Take 10 mg by mouth daily        SARNA 0.5-0.5 % Lotn   Generic drug:  camphor-menthol      Apply topically 2 times daily as needed Until rash is cleared, then d/c.        tamsulosin 0.4 MG capsule    FLOMAX    31 capsule    Take 1 capsule (0.4 mg) by mouth daily    Benign prostatic hyperplasia without lower urinary tract symptoms, unspecified morphology       vitamin D 60865 UNIT capsule    ERGOCALCIFEROL    8 capsule    TAKE 1 CAPSULE BY MOUTH TWICE WEEKLY    Vitamin D deficiency

## 2017-07-19 DIAGNOSIS — I10 BENIGN ESSENTIAL HYPERTENSION: Primary | ICD-10-CM

## 2017-07-19 NOTE — PATIENT INSTRUCTIONS
Today's instructions:    The rash on your chest is almost totally healed.  We can decrease the Sarna lotion to twice a day as needed.

## 2017-07-24 RX ORDER — LISINOPRIL 10 MG/1
10 TABLET ORAL DAILY
Qty: 31 TABLET | Refills: 11 | Status: SHIPPED | OUTPATIENT
Start: 2017-07-24 | End: 2018-06-11

## 2017-07-26 ENCOUNTER — TRANSFERRED RECORDS (OUTPATIENT)
Dept: HEALTH INFORMATION MANAGEMENT | Facility: CLINIC | Age: 77
End: 2017-07-26

## 2017-07-26 LAB
ANION GAP SERPL CALCULATED.3IONS-SCNC: 10 MMOL/L (ref 3–14)
BUN SERPL-MCNC: 20 MG/DL (ref 7–30)
CALCIUM SERPL-MCNC: 8.8 MG/DL (ref 8.5–10.1)
CHLORIDE SERPLBLD-SCNC: 101 MMOL/L (ref 94–109)
CO2 SERPL-SCNC: 26 MMOL/L (ref 20–32)
CREAT SERPL-MCNC: 0.95 MG/DL (ref 0.66–1.25)
GFR SERPL CREATININE-BSD FRML MDRD: 77 ML/MIN/1.73M2
GLUCOSE SERPL-MCNC: 80 MG/DL (ref 70–99)
POTASSIUM SERPL-SCNC: 4 MMOL/L (ref 3.4–5.3)
SODIUM SERPL-SCNC: 137 MMOL/L (ref 133–144)

## 2017-08-01 ENCOUNTER — TRANSFERRED RECORDS (OUTPATIENT)
Dept: HEALTH INFORMATION MANAGEMENT | Facility: CLINIC | Age: 77
End: 2017-08-01

## 2017-08-01 LAB
ANION GAP SERPL CALCULATED.3IONS-SCNC: 7 MMOL/L (ref 3–14)
BUN SERPL-MCNC: 16 MG/DL (ref 7–30)
CALCIUM SERPL-MCNC: 8.5 MG/DL (ref 8.5–10.1)
CHLORIDE SERPLBLD-SCNC: 101 MMOL/L (ref 94–109)
CO2 SERPL-SCNC: 27 MMOL/L (ref 20–32)
CREAT SERPL-MCNC: 0.86 MG/DL (ref 0.66–1.25)
DIFFERENTIAL: ABNORMAL
ERYTHROCYTE [DISTWIDTH] IN BLOOD BY AUTOMATED COUNT: 14.2 % (ref 10–15)
GFR SERPL CREATININE-BSD FRML MDRD: 85 ML/MIN/1.73M2
GLUCOSE SERPL-MCNC: 87 MG/DL (ref 70–99)
HCT VFR BLD AUTO: 40.1 % (ref 40–53)
HEMOGLOBIN: 13.7 G/DL (ref 13.3–17.7)
MCH RBC QN AUTO: 32.5 PG (ref 26.5–33)
MCHC RBC AUTO-ENTMCNC: 34.2 G/DL (ref 31.5–36.5)
MCV RBC AUTO: 95 FL (ref 78–100)
PLATELET # BLD AUTO: 170 10E9/L (ref 150–450)
POTASSIUM SERPL-SCNC: 3.8 MMOL/L (ref 3.4–5.3)
RBC # BLD AUTO: 4.21 10E12/L (ref 4.4–5.9)
SODIUM SERPL-SCNC: 135 MMOL/L (ref 133–144)
TSH SERPL-ACNC: 5.06 MU/L (ref 0.4–4)
WBC # BLD AUTO: 4.7 10E9/L (ref 4–11)

## 2017-08-15 ENCOUNTER — ASSISTED LIVING VISIT (OUTPATIENT)
Dept: GERIATRICS | Facility: CLINIC | Age: 77
End: 2017-08-15
Payer: COMMERCIAL

## 2017-08-15 VITALS
RESPIRATION RATE: 21 BRPM | WEIGHT: 174.8 LBS | HEART RATE: 62 BPM | DIASTOLIC BLOOD PRESSURE: 68 MMHG | OXYGEN SATURATION: 98 % | TEMPERATURE: 97.9 F | SYSTOLIC BLOOD PRESSURE: 117 MMHG

## 2017-08-15 DIAGNOSIS — G30.8 ALZHEIMER'S DISEASE OF OTHER ONSET WITHOUT BEHAVIORAL DISTURBANCE: Primary | ICD-10-CM

## 2017-08-15 DIAGNOSIS — E89.0 POSTABLATIVE HYPOTHYROIDISM: ICD-10-CM

## 2017-08-15 DIAGNOSIS — F02.80 ALZHEIMER'S DISEASE OF OTHER ONSET WITHOUT BEHAVIORAL DISTURBANCE: Primary | ICD-10-CM

## 2017-08-15 DIAGNOSIS — I10 BENIGN ESSENTIAL HYPERTENSION: ICD-10-CM

## 2017-08-15 NOTE — PROGRESS NOTES
[facility]  Chief Complaint   Patient presents with     Annual Comprehensive Exam Assisted Living       HPI:    Elie Marcano is a 76 year old  (1940), who is being seen today for an annual comprehensive visit at [facility].  HPI information obtained from: facility chart records, facility staff and [facility] chart review.  Today's concerns are:    Essential Hypertension  BP improved since starting lisinopril.   He denies chest pain or shortness of breath.     BP Readings from Last 3 Encounters:   08/15/17 117/68   07/18/17 128/68   07/12/17 173/73     Postablative hypothyroidism  History of Graves disease with a multinodular toxic goiter. He was previously treated with Methimazole and 1-131 therapy prior to moving to [place] in May of 2016. He was followed by an Endocrinologist while living in [place].He is currently managed on Levothyroxine 112 mcg daily. TSH is stable.  He dose was decreased from 125 mcg daily. He denies fatigue.     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 He resides in memory care. Nurses report that he has occasionally been resistant to cares associated with showers.  He has been redirected without escalation of behavior.He is currently managed on Donepezil 10 mg daily. No sleep disturbances reported. SLUMS on admission was 11/30. SLUMS recently completed in one year follow up by nursing staff 7/30.He has lost 7 lbs in the past 9 months. He ambulates independently.     Vitamin D deficiency  History of vitamin D deficiency He is currently managed on Ergocalciferol 50,000 iu twice weekly. His previous Vtamin D level 10/20/16 was 35. Have not rechecked this.     ALLERGIES: Review of patient's allergies indicates no known allergies.  PROBLEM LIST:  Patient Active Problem List   Diagnosis     Health Care Home     Benign essential hypertension     Alzheimer's disease of other onset without behavioral disturbance      Benign prostatic hyperplasia without lower urinary tract symptoms, unspecified morphology     Vitamin D deficiency     Advance care planning     Postablative hypothyroidism     Rash and nonspecific skin eruption     PAST MEDICAL HISTORY:  has a past medical history of Alzheimer disease; BPH (benign prostatic hyperplasia); Chronic sinusitis; Cognitive impairment; Elevated PSA; Hyperlipidemia; Hypertension; Syncope; Thyroid disease; Vitamin D deficiency; and Weight loss.  PAST SURGICAL HISTORY:  has a past surgical history that includes appendectomy.  FAMILY HISTORY: Family history is unknown by patient.  SOCIAL HISTORY:  reports that he has never smoked. He does not have any smokeless tobacco history on file. He reports that he does not drink alcohol or use illicit drugs.  IMMUNIZATIONS:  Most Recent Immunizations   Administered Date(s) Administered     Influenza (High Dose) 3 valent vaccine 09/29/2016     Influenza (IIV3) 10/18/2012     Influenza Intranasal Vaccine 09/20/2013     Pneumococcal (PCV 13) 09/24/2015     Pneumococcal 23 valent 12/09/2013     Tdap (Adacel,Boostrix) 01/01/2013     Yellow Fever 11/04/2003     Zoster vaccine, live 12/10/2013     Above immunizations pulled from [facility]. [facility] and facility records also reconciled. Outstanding information sent to  to update [facility].  Future immunizations are not needed at this point as all recommended immunizations are up to date.   MEDICATIONS:  Current Outpatient Prescriptions   Medication Sig Dispense Refill     lisinopril (PRINIVIL/ZESTRIL) 10 MG tablet Take 1 tablet (10 mg) by mouth daily 31 tablet 11     DONEPEZIL HCL PO Take 10 mg by mouth At Bedtime       LEVOTHYROXINE SODIUM PO Take 112 mcg by mouth At Bedtime        camphor-menthol (SARNA) 0.5-0.5 % LOTN Apply topically 2 times daily as needed Until rash is cleared, then d/c.        FLUTICASONE PROPIONATE, NASAL, NA Spray 1 spray in nostril daily 1 spray daily. For  Rhinitis       latanoprost (XALATAN) 0.005 % ophthalmic solution Place 1 drop into both eyes At Bedtime       Multiple Vitamins-Minerals (CEROVITE SENIOR) TABS Take 1 tablet by mouth At Bedtime       tamsulosin (FLOMAX) 0.4 MG 24 hr capsule Take 1 capsule (0.4 mg) by mouth daily 31 capsule PRN     vitamin D (ERGOCALCIFEROL) 97058 UNIT capsule TAKE 1 CAPSULE BY MOUTH TWICE WEEKLY 8 capsule 11     Medications reviewed:  Medications reconciled to facility chart and changes were made to reflect current medications as identified as above med list. Below are the changes that were made:   Medications stopped since last EPIC medication reconciliation:   There are no discontinued medications.    Medications started since last [facility] medication reconciliation:  No orders of the defined types were placed in this encounter.    Case Management:  I have reviewed the Assisted Living care plan, current immunizations and preventive care/cancer screening..Future cancer screening is not clinically indicated secondary to age/goals of care Patient's desire to return to the community is currently living in a community environment and content with living situation. Current Level of Care is appropriate.    Advance Directive Discussion:    I reviewed the current advanced directives as reflected in EPIC, the POLST and the facility chart, and verified the congruency of orders.  I did not due to cognitive impairment review the advance directives with the resident.     Team Discussion:  I communicated with the appropriate disciplines involved with the Plan of Care:   [facility] staff    Patient Goal:  Patient's goal is pain control and comfort.    Information reviewed:  Medications, vital signs, orders, and nursing notes.    ROS:  10 point ROS of systems including Constitutional, Eyes, Respiratory, Cardiovascular, Gastroenterology, Genitourinary, Integumentary, Muscularskeletal, Psychiatric were all negative except for pertinent positives noted in my  HPI.    Exam:  /68  Pulse 62  Temp 97.9  F (36.6  C)  Resp 21  Wt 174 lb 12.8 oz (79.3 kg)  SpO2 98%  GENERAL APPEARANCE:  Alert, in no distress, cooperative, well dressed  EYES:  EOM normal, conjunctiva and lids normal  NECK:  No adenopathy,masses or thyromegaly  RESP:  lungs clear to auscultation , no respiratory distress  CV:  regular rate and rhythm, no murmur, rub, or gallop, no edema  ABDOMEN:  normal bowel sounds, soft, nontender, no hepatosplenomegaly or other masses  M/S:   Gait and station normal  SKIN:  Inspection of skin and subcutaneous tissue baseline  NEURO:   no focal deficits noted; walking unassisted  PSYCH:  memory impaired , affect and mood normal; pleasant and conversant    Lab/Diagnostic data:    CBC RESULTS:   Recent Labs   Lab Test 08/01/17   WBC  4.7   RBC  4.21*   HGB  13.7   HCT  40.1   MCV  95   MCH  32.5   MCHC  34.2   RDW  14.2   PLT  170       Last Basic Metabolic Panel:  Recent Labs   Lab Test 08/01/17 07/26/17   NA  135  137   POTASSIUM  3.8  4.0   CHLORIDE  101  101   RONALD  8.5  8.8   CO2  27  26   BUN  16  20   CR  0.86  0.95   GLC  87  80       Liver Function Studies -   Recent Labs   Lab Test 11/03/16   PROTTOTAL  6.3*   ALBUMIN  3.0  3.0*   BILITOTAL  0.5  0.5   ALKPHOS  68  68   AST  19  19   ALT  29  29       TSH   Date Value Ref Range Status   08/01/2017 5.06 (A) 0.40 - 4.00 mU/L Final   06/08/2017 0.71 0.40 - 4.00 mU/L Final       ASSESSMENT/PLAN  Assessment/Plan:  Essential Hypertension  Continue lisinopril 10 mg per day     Postablative hypothyroidism  Continue Levothyroxine to 112 mcg daily. Continue to follow TSH     Alzheimer's disease of 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 Continue Donepizil. Continue to monitor mood and behaviors.  Staff report occasional mild agitation with bath cares.  Monitor and redirect.     Vitamin D deficiency  Continue Ergocalciferol, check Vitamin D level    40  minutes spent in face to face visit with > 50% time spent on coordination of care for multiple medical problems as listed above including focus on annual comprehensive exam.    Lola Velarde CNP

## 2017-08-17 DIAGNOSIS — E03.8 OTHER SPECIFIED HYPOTHYROIDISM: Primary | ICD-10-CM

## 2017-08-18 RX ORDER — LEVOTHYROXINE SODIUM 112 UG/1
112 CAPSULE ORAL DAILY
Qty: 31 CAPSULE | Refills: 11 | Status: SHIPPED | OUTPATIENT
Start: 2017-08-18 | End: 2018-08-24

## 2017-08-22 ENCOUNTER — TRANSFERRED RECORDS (OUTPATIENT)
Dept: HEALTH INFORMATION MANAGEMENT | Facility: CLINIC | Age: 77
End: 2017-08-22

## 2017-09-20 ENCOUNTER — TRANSFERRED RECORDS (OUTPATIENT)
Dept: HEALTH INFORMATION MANAGEMENT | Facility: CLINIC | Age: 77
End: 2017-09-20

## 2017-09-20 LAB — TSH SERPL-ACNC: 1.13 MU/L (ref 0.4–4)

## 2017-10-03 ENCOUNTER — DOCUMENTATION ONLY (OUTPATIENT)
Dept: OTHER | Facility: CLINIC | Age: 77
End: 2017-10-03

## 2017-10-03 DIAGNOSIS — Z71.89 ADVANCE CARE PLANNING: Chronic | ICD-10-CM

## 2017-10-06 DIAGNOSIS — F02.818 LATE ONSET ALZHEIMER'S DISEASE WITH BEHAVIORAL DISTURBANCE (H): ICD-10-CM

## 2017-10-06 DIAGNOSIS — G30.1 LATE ONSET ALZHEIMER'S DISEASE WITH BEHAVIORAL DISTURBANCE (H): ICD-10-CM

## 2017-10-06 RX ORDER — DONEPEZIL HYDROCHLORIDE 10 MG/1
10 TABLET, ORALLY DISINTEGRATING ORAL DAILY
Qty: 31 TABLET | Refills: 98 | Status: SHIPPED | OUTPATIENT
Start: 2017-10-06 | End: 2017-10-26

## 2017-10-12 ENCOUNTER — DOCUMENTATION ONLY (OUTPATIENT)
Dept: OTHER | Facility: CLINIC | Age: 77
End: 2017-10-12

## 2017-10-17 DIAGNOSIS — N40.0 BENIGN PROSTATIC HYPERPLASIA, UNSPECIFIED WHETHER LOWER URINARY TRACT SYMPTOMS PRESENT: Primary | ICD-10-CM

## 2017-10-18 RX ORDER — TAMSULOSIN HYDROCHLORIDE 0.4 MG/1
0.4 CAPSULE ORAL DAILY
Qty: 31 CAPSULE | Refills: 98 | Status: SHIPPED | OUTPATIENT
Start: 2017-10-18 | End: 2018-01-01

## 2017-10-26 ENCOUNTER — ASSISTED LIVING VISIT (OUTPATIENT)
Dept: GERIATRICS | Facility: CLINIC | Age: 77
End: 2017-10-26
Payer: COMMERCIAL

## 2017-10-26 VITALS
SYSTOLIC BLOOD PRESSURE: 117 MMHG | TEMPERATURE: 97.5 F | RESPIRATION RATE: 20 BRPM | HEART RATE: 73 BPM | DIASTOLIC BLOOD PRESSURE: 63 MMHG | WEIGHT: 175.1 LBS

## 2017-10-26 DIAGNOSIS — G30.8 ALZHEIMER'S DISEASE OF OTHER ONSET WITHOUT BEHAVIORAL DISTURBANCE: ICD-10-CM

## 2017-10-26 DIAGNOSIS — I10 BENIGN ESSENTIAL HYPERTENSION: ICD-10-CM

## 2017-10-26 DIAGNOSIS — E89.0 POSTABLATIVE HYPOTHYROIDISM: Primary | ICD-10-CM

## 2017-10-26 DIAGNOSIS — E55.9 VITAMIN D DEFICIENCY: ICD-10-CM

## 2017-10-26 DIAGNOSIS — F02.80 ALZHEIMER'S DISEASE OF OTHER ONSET WITHOUT BEHAVIORAL DISTURBANCE: ICD-10-CM

## 2017-10-26 PROCEDURE — 99207 ZZC CDG-CORRECTLY CODED, REVIEWED AND AGREE: CPT | Performed by: INTERNAL MEDICINE

## 2017-10-26 RX ORDER — ERGOCALCIFEROL 1.25 MG/1
50000 CAPSULE, LIQUID FILLED ORAL WEEKLY
COMMUNITY
End: 2017-10-26

## 2017-10-26 NOTE — PROGRESS NOTES
Elie Marcano is a 77 year old male seen October 26, 2017 at [facility] unit where he has resided for one year (admit 7/2016) seen to follow up worsening dementia, recent changes in gait and hypothyroidism.     Patient is seen on the unit, pleasant and conversational.   Slow to rise from a chair and handhold assist for ambulation.    Nursing staff reports some change in his gait, dragging his feet more.       Nursing staff reports occ emesis, had an episode last Sunday.    Today states he has N/V once in a while.    Patient has a h/o Graves' disease with toxic multinodular goiter, treated with methimazole and I-131 tx 5/2016 in [place].   Patient reports he sleeps okay.  No dizziness.       Past Medical History   Diagnosis Date     Hypertension      Cognitive impairment      Vitamin D deficiency      Alzheimer disease      Hyperlipidemia      BPH (benign prostatic hyperplasia)      Elevated PSA      Syncope      Thyroid disease      Graves disease, Multinodular goiter  S/p I-131 ablation, 2016     Weight loss      Chronic sinusitis        SH:  Single, no children.   His friend Shayla Mitchell is his primary contact  Patient reports he was raised on [place] farm in [place], has one brother.   Worked as a , and for [employer].     ROS: limited but negative other than above.   SLUMS 7/30    EXAM:  Pleasant, NAD  /63  Pulse 73  Temp 97.5  F (36.4  C)  Resp 20  Wt 175 lb 1.6 oz (79.4 kg)   Neck supple without adenopathy  Lungs decreased BS, few scattered crackles   Heart RRR s1s2 @80 without ectopy, 1/6 APRIL  Abd soft, NT, no distention, +BS  Ext without edema  Neuro: ambulatory with hand hold assist, short steps without swing-through.   Psych: affect okay    TSH   Date Value Ref Range Status   09/20/2017 1.13 0.40 - 4.00 mU/L Final   Last Basic Metabolic Panel:  Lab Results   Component Value Date     08/01/2017      Lab Results   Component Value Date    POTASSIUM 3.8 08/01/2017      Lab Results   Component Value Date    CHLORIDE 101 08/01/2017     Lab Results   Component Value Date    RONALD 8.5 08/01/2017     Lab Results   Component Value Date    CO2 27 08/01/2017     Lab Results   Component Value Date    BUN 16 08/01/2017     Lab Results   Component Value Date    CR 0.86 08/01/2017     Lab Results   Component Value Date    GLC 87 08/01/2017     Lab Results   Component Value Date    WBC 4.7 08/01/2017     Lab Results   Component Value Date    HGB 13.7 08/01/2017     Lab Results   Component Value Date    MCV 95 08/01/2017     Lab Results   Component Value Date     08/01/2017        IMP/PLAN:  (E89.0) Postablative hypothyroidism   Comment: s/p I-131 tx in May 2016  Plan:   Continue levothyroxine at current dose.     (G30.8,  F02.80) Alzheimer's disease of other onset without behavioral disturbance  Comment: Disoriented, loss of functional status.  Newer changes in mobility.     Plan: some recent GI symptoms, will decrease donepezil to 5 mg/day.    Consider d/c if tolerates dose reduction.     [facility] Memory Care support for assist with meds, meals, activity and safety.     (I10) Benign essential hypertension  Comment: bps remain good after d/c of atenolol  Plan:  Follow bps off meds       (N40.0) Benign prostatic hyperplasia without lower urinary tract symptoms, unspecified morphology  Comment: no current sx  Plan: continue Flomax to avoid urinary retention.       (E55.9) Vitamin D deficiency  Comment:  on replacement  Plan: change weekly dosing to vit D3 2000 units/day        Sharri Dennis MD

## 2017-10-31 ENCOUNTER — TRANSFERRED RECORDS (OUTPATIENT)
Dept: HEALTH INFORMATION MANAGEMENT | Facility: CLINIC | Age: 77
End: 2017-10-31

## 2017-10-31 LAB — TSH SERPL-ACNC: 3.67 MU/L (ref 0.4–4)

## 2017-11-17 DIAGNOSIS — E61.9 DEFICIENCY OF NUTRIENT ELEMENTS: ICD-10-CM

## 2017-11-17 DIAGNOSIS — F02.818 DEMENTIA DUE TO MEDICAL CONDITION WITH BEHAVIORAL DISTURBANCE (H): Primary | ICD-10-CM

## 2017-11-17 RX ORDER — MULTIVIT-MIN/FA/LYCOPEN/LUTEIN .4-300-25
1 TABLET ORAL DAILY
Qty: 30 TABLET | Refills: 98 | Status: SHIPPED | OUTPATIENT
Start: 2017-11-17 | End: 2018-01-01

## 2017-11-17 RX ORDER — MULTIVIT-MIN/IRON/FOLIC ACID/K 18-600-40
2000 CAPSULE ORAL DAILY
Qty: 62 TABLET | Refills: 98 | Status: SHIPPED | OUTPATIENT
Start: 2017-11-17 | End: 2018-01-01

## 2017-11-17 RX ORDER — DONEPEZIL HYDROCHLORIDE 5 MG/1
5 TABLET, ORALLY DISINTEGRATING ORAL DAILY
Qty: 30 TABLET | Refills: 98 | Status: SHIPPED | OUTPATIENT
Start: 2017-11-17 | End: 2018-04-30 | Stop reason: SINTOL

## 2017-11-22 DIAGNOSIS — F02.80 ALZHEIMER'S DEMENTIA WITHOUT BEHAVIORAL DISTURBANCE, UNSPECIFIED TIMING OF DEMENTIA ONSET: Primary | ICD-10-CM

## 2017-11-22 DIAGNOSIS — G30.9 ALZHEIMER'S DEMENTIA WITHOUT BEHAVIORAL DISTURBANCE, UNSPECIFIED TIMING OF DEMENTIA ONSET: Primary | ICD-10-CM

## 2017-11-22 RX ORDER — DONEPEZIL HYDROCHLORIDE 5 MG/1
5 TABLET, FILM COATED ORAL DAILY
Qty: 30 TABLET | Refills: 98 | Status: SHIPPED | OUTPATIENT
Start: 2017-11-22 | End: 2018-04-26

## 2017-12-27 NOTE — PROGRESS NOTES
[facility]    Chief Complaint   Patient presents with     RECHECK     Routine       HPI:    Elie Marcano is a 76 year old  (1940) male with Alzheimer's Dementia, who is being seen today for an episodic care visit at [facility] Unit. Has lived in [place] since July 2016, he moved from [place].     Meaningful hx from resident is limited due to cognitive impairment. Staff assist in providing history.    Today's concerns care:  (G30.8) Alzheimer's disease of other onset without behavioral disturbance  CPT 3.9/5.6 OT, SLUMS on admission 11/30 July 2016. Moved to memory care due cognitive and functional decline.  Currently maintained on donepezil 5 mg daily, dose reduced 10/26/17 due to GI symptoms. No change in mood or behaviors, denies depression or low mood.  Needs assist for ADLs and medications.   mbulates freely on unit without assistive device. Weight stable 170#.    Wt Readings from Last 5 Encounters:   12/28/17 171 lb 4.8 oz (77.7 kg)   10/26/17 175 lb 1.6 oz (79.4 kg)   08/15/17 174 lb 12.8 oz (79.3 kg)   07/18/17 175 lb (79.4 kg)   07/12/17 164 lb (74.4 kg)       (E89.0) Postablative hypothyroid  History of Graves Disease with a multinodular toxic goiter.   Previously treated with methimazole and I-131 therapy in May 2016. Endocrinologist in [place] was Dr. Harris. Currently on Levothyroxine 112 mcg daily since 08/01/17, increased from 110 mcg due to elevated TSH. Previously on 125 mcg, but dose decreased 06/08/17 due to low TSH (0.71).    TSH   Date Value Ref Range Status   10/31/2017 3.67 0.40 - 4.00 mU/L Final   09/20/2017 1.13 0.40 - 4.00 mU/L Final   08/01/2017 5.06 (A) 0.40 - 4.00 mU/L Final   06/08/2017 0.71 0.40 - 4.00 mU/L Final   05/18/2017 0.42 0.40 - 4.00 mU/L Final       (I10) Hypertension  Atenolol was stopped due to low blood pressure.  Currently maintained on lisinopril 10 mg daily started in July 2017 due to SBP 150s-170s.  Denies chest  pain, palpitations, dizziness, headache.  Recent VS reviewed:  BP Readings from Last 3 Encounters:   12/28/17 127/60   10/26/17 117/63   08/15/17 117/68   Last HR: 80.    Lab Results   Component Value Date    CR 0.86 08/01/2017     (N40.0) BPH  No dysuria or urgency.   Continent of bladder.  Maintained on Flomax.    (J30.2) Seasonal allergic rhinitis  No complaint of of stuffy or runny nose today.   No itchy or watery eyes.   Maintained on Fluticasone nasal spray daily.    (E55.9) Vitamin D deficiency  Last Vitamin D level 35 10/20/16.  Maintained on 2,000 units daily ,dose decreased from high dose in October 2017.    ALLERGIES: Review of patient's allergies indicates no known allergies.  Past Medical, Surgical, Family and Social History reviewed and updated in [facility].    Current Outpatient Prescriptions   Medication Sig Dispense Refill     donepezil (ARICEPT) 5 MG tablet Take 1 tablet (5 mg) by mouth daily 30 tablet 98     Multiple Vitamins-Minerals (CEROVITE SENIOR) TABS Take 1 tablet by mouth daily 30 tablet 98     DONEPEZIL HYDROCHLORIDE 5 MG TBDP Take 5 mg by mouth daily 30 tablet 98     Vitamin D, Cholecalciferol, 1000 UNITS TABS Take 2,000 Units by mouth daily 62 tablet 98     LORAZEPAM PO Take 0.5 mg by mouth twice a week 30 minutes before showering       ACETAMINOPHEN PO Take 650 mg by mouth 4 times daily as needed for pain       tamsulosin (FLOMAX) 0.4 MG capsule Take 1 capsule (0.4 mg) by mouth daily 31 capsule 98     Levothyroxine Sodium 112 MCG CAPS Take 112 mcg by mouth daily 31 capsule 11     lisinopril (PRINIVIL/ZESTRIL) 10 MG tablet Take 1 tablet (10 mg) by mouth daily 31 tablet 11     camphor-menthol (SARNA) 0.5-0.5 % LOTN Apply topically 2 times daily as needed Until rash is cleared, then d/c.        FLUTICASONE PROPIONATE, NASAL, NA Spray 1 spray in nostril daily 1 spray daily. For Rhinitis       latanoprost (XALATAN) 0.005 % ophthalmic solution Place 1 drop into both eyes At Bedtime        Medications reviewed:  Medications reconciled to facility chart and changes were made to reflect current medications as identified as above med list. Below are the changes that were made:   Medications stopped since last EPIC medication reconciliation:   There are no discontinued medications.    Medications started since last [facility] medication reconciliation:  No orders of the defined types were placed in this encounter.    Social Hx: Names POA as friend Shayla Mitchell.     REVIEW OF SYSTEMS:  4 point ROS of systems including Respiratory, Cardiovascular, Gastroenterology, Genitourinary, were all negative except for pertinent positives noted in my HPI.    Physical Exam:  /60  Pulse 80  Temp 97.7  F (36.5  C)  Resp 19  Wt 171 lb 4.8 oz (77.7 kg)  GENERAL APPEARANCE:  Alert, in no distress, clean and well dressed  HEENT: good dentition, MMM w/o exudate, conjunctiva w/o injection, no drainage  RESP:  respiratory effort and palpation of chest normal, lungs clear to auscultation , no respiratory distress  CV:  Palpation and auscultation of heart done , regular rate and rhythm, no murmur, rub, or gallop  PV: no edema, calves are supple and non-tender  GI: abd is soft, non-tender, non-distended, + BS  M/S:   Gait and station normal, ambulating independently without assistive device. 5/5 biceps strength.   SKIN:  No visible rashes, lesions or ulcerations. Skin without increased warmth or tenderness.  Neuro: CN II-XII grossly intact, no tremor, normal tone  PSYCH:  memory impaired, oriented to self only, speech fluent, judgment impaired, affect and mood normal    Recent Labs:    CBC RESULTS:   Recent Labs   Lab Test 08/01/17   WBC  4.7   RBC  4.21*   HGB  13.7   HCT  40.1   MCV  95   MCH  32.5   MCHC  34.2   RDW  14.2   PLT  170       Last Basic Metabolic Panel:  Recent Labs   Lab Test 08/01/17 07/26/17   NA  135  137   POTASSIUM  3.8  4.0   CHLORIDE  101  101   RONALD  8.5  8.8   CO2  27  26   BUN  16  20   CR  0.86   0.95   GLC  87  80       Liver Function Studies -   Recent Labs   Lab Test 11/03/16   PROTTOTAL  6.3*   ALBUMIN  3.0  3.0*   BILITOTAL  0.5  0.5   ALKPHOS  68  68   AST  19  19   ALT  29  29       TSH   Date Value Ref Range Status   10/31/2017 3.67 0.40 - 4.00 mU/L Final   09/20/2017 1.13 0.40 - 4.00 mU/L Final     Assessment/Plan:  Alzheimer's disease of other onset without behavioral disturbance  Comment: SLUMS 11/30. Now on memory care unit due to functional and cognitive decline. Expect progressive decline as per disease process. No change in mood or behavior with GDR of donepezil due to GI symptoms   Plan:   - continue supportive care in current environment for medication administration, meals, ADL assistance, socialization, safety.  - Continue donepezil at 5 mg daily, consider stopping at next routine follow-up     Postablative hypothyroid  Comment: Graves Diease, Treated with methimazole and I-131 in May 2016. Two dose changes over last six months, last TSH 3.67 on 10/31/17  Plan:   - continue levothyroxine 112 mcg daily, on this dose since 08/01/17  - Check TSH 6 months from last (Feb 2018) at next routine follow-up to ensure stability     (I10) Hypertension  Comment: atenolol stopped, lisinopril restarted in July 2017   Plan:   - Continue lisinopril 10 mg daily  - Monitor labs, VS   - Check interval BMP at next routine follow-up in Feb 2018    (N40.0) BPH  Comment: no symptoms on Flomax  Plan:  - continue Flomax and monitor clinically     (J30.2) Seasonal allergic rhinitis  Comment: no c/o of rhinitis  Plan:   - Continue daily fluticasone nasal spray     (E55.9) Vitamin D deficiency  Comment:  Last Vitamin D level 35 10/20/1, no on maintenance dose of vitamin D supplement   Plan:   - continue vitamin D 2,000 unit daily    Follow-up in Feb 2018, check BMP and TSH, monitor vital signs, cognitive ability, and functional status, consider stopping donepezil.      Electronically signed by  Debbie Plata,  APRN, CNP

## 2017-12-28 ENCOUNTER — ASSISTED LIVING VISIT (OUTPATIENT)
Dept: GERIATRICS | Facility: CLINIC | Age: 77
End: 2017-12-28
Payer: COMMERCIAL

## 2017-12-28 VITALS
WEIGHT: 171.3 LBS | DIASTOLIC BLOOD PRESSURE: 60 MMHG | SYSTOLIC BLOOD PRESSURE: 127 MMHG | TEMPERATURE: 97.7 F | HEART RATE: 80 BPM | RESPIRATION RATE: 19 BRPM

## 2017-12-28 DIAGNOSIS — J30.2 SEASONAL ALLERGIC RHINITIS, UNSPECIFIED CHRONICITY, UNSPECIFIED TRIGGER: ICD-10-CM

## 2017-12-28 DIAGNOSIS — E55.9 VITAMIN D DEFICIENCY: ICD-10-CM

## 2017-12-28 DIAGNOSIS — N40.0 BENIGN PROSTATIC HYPERPLASIA, UNSPECIFIED WHETHER LOWER URINARY TRACT SYMPTOMS PRESENT: ICD-10-CM

## 2017-12-28 DIAGNOSIS — I10 BENIGN ESSENTIAL HYPERTENSION: ICD-10-CM

## 2017-12-28 DIAGNOSIS — F02.80 ALZHEIMER'S DISEASE OF OTHER ONSET WITHOUT BEHAVIORAL DISTURBANCE: Primary | ICD-10-CM

## 2017-12-28 DIAGNOSIS — E89.0 POSTABLATIVE HYPOTHYROIDISM: ICD-10-CM

## 2017-12-28 DIAGNOSIS — G30.8 ALZHEIMER'S DISEASE OF OTHER ONSET WITHOUT BEHAVIORAL DISTURBANCE: Primary | ICD-10-CM

## 2018-01-01 ENCOUNTER — ASSISTED LIVING VISIT (OUTPATIENT)
Dept: GERIATRICS | Facility: CLINIC | Age: 78
End: 2018-01-01
Payer: COMMERCIAL

## 2018-01-01 ENCOUNTER — TRANSFERRED RECORDS (OUTPATIENT)
Dept: HEALTH INFORMATION MANAGEMENT | Facility: CLINIC | Age: 78
End: 2018-01-01

## 2018-01-01 VITALS
DIASTOLIC BLOOD PRESSURE: 71 MMHG | OXYGEN SATURATION: 98 % | SYSTOLIC BLOOD PRESSURE: 140 MMHG | TEMPERATURE: 98.8 F | HEART RATE: 84 BPM

## 2018-01-01 VITALS
WEIGHT: 141.8 LBS | OXYGEN SATURATION: 98 % | TEMPERATURE: 98.8 F | HEART RATE: 75 BPM | RESPIRATION RATE: 20 BRPM | DIASTOLIC BLOOD PRESSURE: 77 MMHG | SYSTOLIC BLOOD PRESSURE: 132 MMHG

## 2018-01-01 VITALS
TEMPERATURE: 98.4 F | SYSTOLIC BLOOD PRESSURE: 152 MMHG | HEART RATE: 64 BPM | DIASTOLIC BLOOD PRESSURE: 80 MMHG | WEIGHT: 150 LBS | OXYGEN SATURATION: 99 % | RESPIRATION RATE: 18 BRPM

## 2018-01-01 DIAGNOSIS — F02.818 EARLY ONSET ALZHEIMER'S DISEASE WITH BEHAVIORAL DISTURBANCE (H): ICD-10-CM

## 2018-01-01 DIAGNOSIS — I10 BENIGN ESSENTIAL HYPERTENSION: Primary | ICD-10-CM

## 2018-01-01 DIAGNOSIS — G30.0 EARLY ONSET ALZHEIMER'S DISEASE WITH BEHAVIORAL DISTURBANCE (H): ICD-10-CM

## 2018-01-01 DIAGNOSIS — I10 BENIGN ESSENTIAL HYPERTENSION: ICD-10-CM

## 2018-01-01 DIAGNOSIS — E89.0 POSTABLATIVE HYPOTHYROIDISM: ICD-10-CM

## 2018-01-01 DIAGNOSIS — E55.9 VITAMIN D DEFICIENCY: ICD-10-CM

## 2018-01-01 DIAGNOSIS — E03.8 OTHER SPECIFIED HYPOTHYROIDISM: ICD-10-CM

## 2018-01-01 DIAGNOSIS — R63.4 LOSS OF WEIGHT: Primary | ICD-10-CM

## 2018-01-01 DIAGNOSIS — Z71.89 ADVANCE CARE PLANNING: ICD-10-CM

## 2018-01-01 DIAGNOSIS — F32.A DEPRESSION, UNSPECIFIED DEPRESSION TYPE: Primary | ICD-10-CM

## 2018-01-01 DIAGNOSIS — E61.9 DEFICIENCY OF NUTRIENT ELEMENTS: ICD-10-CM

## 2018-01-01 DIAGNOSIS — N40.0 BENIGN PROSTATIC HYPERPLASIA, UNSPECIFIED WHETHER LOWER URINARY TRACT SYMPTOMS PRESENT: ICD-10-CM

## 2018-01-01 DIAGNOSIS — M19.019 OSTEOARTHRITIS OF SHOULDER, UNSPECIFIED LATERALITY, UNSPECIFIED OSTEOARTHRITIS TYPE: Primary | ICD-10-CM

## 2018-01-01 LAB
ALBUMIN SERPL-MCNC: 3.4 G/DL (ref 3.4–5)
ALP SERPL-CCNC: 62 U/L (ref 40–150)
ALT SERPL-CCNC: 17 U/L (ref 0–70)
ANION GAP SERPL CALCULATED.3IONS-SCNC: 6 MMOL/L (ref 3–14)
AST SERPL-CCNC: 17 U/L (ref 0–45)
BILIRUB SERPL-MCNC: 0.6 MG/DL (ref 0.2–1.3)
BUN SERPL-MCNC: 12 MG/DL (ref 7–30)
CALCIUM SERPL-MCNC: 8.9 MG/DL (ref 8.5–10.1)
CHLORIDE SERPLBLD-SCNC: 101 MMOL/L (ref 94–109)
CO2 SERPL-SCNC: 28 MMOL/L (ref 20–32)
CREAT SERPL-MCNC: 0.82 MG/DL (ref 0.66–1.25)
DIFFERENTIAL: ABNORMAL
ERYTHROCYTE [DISTWIDTH] IN BLOOD BY AUTOMATED COUNT: 13.8 % (ref 10–15)
GFR SERPL CREATININE-BSD FRML MDRD: >90 ML/MIN/1.73M2
GLUCOSE SERPL-MCNC: 96 MG/DL (ref 70–99)
HBA1C MFR BLD: 5.8 % (ref 0–5.6)
HCT VFR BLD AUTO: 38.8 % (ref 40–53)
HEMOGLOBIN: 13.3 G/DL (ref 13.3–17.7)
MCH RBC QN AUTO: 32.5 PG (ref 26.5–33)
MCHC RBC AUTO-ENTMCNC: 34.3 G/DL (ref 31.5–36.5)
MCV RBC AUTO: 95 FL (ref 78–100)
PLATELET # BLD AUTO: 175 10^9/L (ref 150–450)
POTASSIUM SERPL-SCNC: 4 MMOL/L (ref 3.4–5.3)
PROT SERPL-MCNC: 6.9 G/DL (ref 6.8–8.8)
RBC # BLD AUTO: 4.09 10^12/L (ref 4.4–5.9)
SODIUM SERPL-SCNC: 135 MMOL/L (ref 133–144)
TSH SERPL-ACNC: 0.47 MU/L (ref 0.4–4)
WBC # BLD AUTO: 5.7 10^9/L (ref 4–11)

## 2018-01-01 RX ORDER — ACETAMINOPHEN 325 MG/1
TABLET ORAL
Qty: 180 TABLET | Refills: 98 | Status: SHIPPED | OUTPATIENT
Start: 2018-01-01

## 2018-01-01 RX ORDER — TAMSULOSIN HYDROCHLORIDE 0.4 MG/1
CAPSULE ORAL
Qty: 31 CAPSULE | Refills: 98 | Status: SHIPPED | OUTPATIENT
Start: 2018-01-01

## 2018-01-01 RX ORDER — CHOLECALCIFEROL (VITAMIN D3) 25 MCG
TABLET ORAL
Qty: 62 TABLET | Refills: 98 | Status: ON HOLD | OUTPATIENT
Start: 2018-01-01 | End: 2019-01-01

## 2018-01-01 RX ORDER — MULTIVIT-MIN/FA/LYCOPEN/LUTEIN .4-300-25
TABLET ORAL
Qty: 31 TABLET | Refills: 98 | Status: SHIPPED | OUTPATIENT
Start: 2018-01-01 | End: 2019-01-01

## 2018-01-01 RX ORDER — LEVOTHYROXINE SODIUM 100 UG/1
TABLET ORAL
Qty: 31 TABLET | Refills: 98 | Status: SHIPPED | OUTPATIENT
Start: 2018-01-01

## 2018-01-01 RX ORDER — MIRTAZAPINE 7.5 MG/1
TABLET, FILM COATED ORAL
Qty: 31 TABLET | Refills: 98 | Status: SHIPPED | OUTPATIENT
Start: 2018-01-01 | End: 2019-01-01

## 2018-01-03 ENCOUNTER — TELEPHONE (OUTPATIENT)
Dept: GERIATRICS | Facility: CLINIC | Age: 78
End: 2018-01-03

## 2018-02-19 NOTE — PROGRESS NOTES
"[place] [facility]    Chief Complaint   Patient presents with     RECHECK     Routine       HPI:    Elie Marcano is a 76 year old  (1940) male with Alzheimer's Dementia, who is being seen today for an episodic care visit at [facility]     HPI information obtained from: facility chart records, facility staff, patient report and [place] Epic chart review.    Today's concerns care:  (Z91.81) History of recent fall   Fall two days ago. Found near bed w/o shoes on, sustained abrasion to forehead. VS WNL. No pain and no apparent injury. Able to get himself off floor. At baseline ambulates freely on unit without assistive device. Healthcare agent declined ER transfer. Resident said he was dizzy prior to falling. He complained of dizziness to nurse one other time last month.  Due to dementia, cannot recall the circumstance of fall \"I was going out to dinner with my friend Shayla, I can't remember what happened.\" No headache. No dizziness now. No chest pain or palpitations.     (G30.8) Alzheimer's disease of other onset without behavioral disturbance  Resident of [facility] since July 2016 - moved from [place].   [facility] on admission 11/30 July 2016.   Currently maintained on donepezil 5 mg daily, dose reduced 10/26/17 due to GI symptoms.  Staff report difficulty with showering. Gets ativan prior, initially helped with anxiety, but now becomes combative and agitated - hits and scratches. Does not like assistance with ADLs, has thrown water in aide's face.  Eating well and weight is stable.     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   Previously treated with methimazole and I-131 therapy in May 2016. While in [place], was followed by endocrinology.   Previously on levothyroxine 125 mcg, but dose decreased to 110 mcg 06/08/17 due to low TSH (0.71). On 8/01/2017 Levothyroxine increased to 112 mcg daily due to slightly elevated TSH (5.06). " "  TSH   Date Value Ref Range Status   10/31/2017 3.67 0.40 - 4.00 mU/L Final   09/20/2017 1.13 0.40 - 4.00 mU/L Final   08/01/2017 5.06 (A) 0.40 - 4.00 mU/L Final   06/08/2017 0.71 0.40 - 4.00 mU/L Final   05/18/2017 0.42 0.40 - 4.00 mU/L Final       (I10) Hypertension  Atenolol was stopped due to low heart rate and BP.  Currently maintained on lisinopril 10 mg daily started in July 2017.  Denies chest pain, palpitations, dizziness, headache.  Recent VS reviewed:  BP today 136/69  BP Readings from Last 3 Encounters:   12/28/17 127/60   10/26/17 117/63   08/15/17 117/68   Last HR: 94.    Lab Results   Component Value Date    CR 0.86 08/01/2017     (N40.0) BPH  No dysuria or urgency.   Continent of bladder.  Maintained on Flomax 0.4 mg daily.    (J30.2) Seasonal allergic rhinitis  \"My sinuses bother me, but that's everyone!\"  No itchy or watery eyes.   Maintained on Fluticasone nasal spray daily.    ALLERGIES: Review of patient's allergies indicates no known allergies.    Past Medical, Surgical, Family and Social History reviewed and updated in [facility].    Current Outpatient Prescriptions   Medication Sig Dispense Refill     donepezil (ARICEPT) 5 MG tablet Take 1 tablet (5 mg) by mouth daily 30 tablet 98     Multiple Vitamins-Minerals (CEROVITE SENIOR) TABS Take 1 tablet by mouth daily 30 tablet 98     DONEPEZIL HYDROCHLORIDE 5 MG TBDP Take 5 mg by mouth daily 30 tablet 98     Vitamin D, Cholecalciferol, 1000 UNITS TABS Take 2,000 Units by mouth daily 62 tablet 98     LORAZEPAM PO Take 0.5 mg by mouth twice a week 30 minutes before showering       ACETAMINOPHEN PO Take 650 mg by mouth 4 times daily as needed for pain       tamsulosin (FLOMAX) 0.4 MG capsule Take 1 capsule (0.4 mg) by mouth daily 31 capsule 98     Levothyroxine Sodium 112 MCG CAPS Take 112 mcg by mouth daily 31 capsule 11     lisinopril (PRINIVIL/ZESTRIL) 10 MG tablet Take 1 tablet (10 mg) by mouth daily 31 tablet 11     camphor-menthol (SARNA) 0.5-0.5 % " LOTN Apply topically 2 times daily as needed Until rash is cleared, then d/c.        FLUTICASONE PROPIONATE, NASAL, NA Spray 1 spray in nostril daily 1 spray daily. For Rhinitis       latanoprost (XALATAN) 0.005 % ophthalmic solution Place 1 drop into both eyes At Bedtime       Medications reviewed:  Medications reconciled to facility chart and changes were made to reflect current medications as identified as above med list. Below are the changes that were made:   Medications stopped since last EPIC medication reconciliation:   There are no discontinued medications.    Medications started since last [facility] medication reconciliation:  No orders of the defined types were placed in this encounter.    Social Hx: Names POA as friend Shayla Mitchell.     REVIEW OF SYSTEMS:  4 point ROS of systems including Respiratory, Cardiovascular, Gastroenterology, Genitourinary, were all negative except for pertinent positives noted in my HPI.    Physical Exam:  There were no vitals taken for this visit.  GENERAL APPEARANCE:  Alert, in no distress, clean and well dressed  HEENT: EOM intact w/o nystagmus, about 1 cm x 0.5 cm scabbed abrasion to R forehead, good dentition, MMM w/o exudate, conjunctiva w/o injection, no drainage. No tenderness over abrasion.  RESP:  respiratory effort and palpation of chest normal, lungs clear to auscultation , no respiratory distress  CV:  Palpation and auscultation of heart done , regular rate and rhythm, no murmur, rub, or gallop  PV: no edema, calves are supple and non-tender  GI: abd is soft, non-tender, non-distended, + BS  M/S:   Gait and station normal, ambulating independently without assistive device. 5/5 biceps strength.   SKIN:  No visible rashes, lesions or ulcerations. See HEENT. Skin without increased warmth or tenderness.  Neuro: CN II-XII grossly intact, no tremor, normal tone  PSYCH:  memory impaired, oriented to self only, speech fluent, judgment impaired, affect and mood normal    Recent  Labs:    CBC RESULTS:   Recent Labs   Lab Test 08/01/17   WBC  4.7   RBC  4.21*   HGB  13.7   HCT  40.1   MCV  95   MCH  32.5   MCHC  34.2   RDW  14.2   PLT  170       Last Basic Metabolic Panel:  Recent Labs   Lab Test 08/01/17 07/26/17   NA  135  137   POTASSIUM  3.8  4.0   CHLORIDE  101  101   RONALD  8.5  8.8   CO2  27  26   BUN  16  20   CR  0.86  0.95   GLC  87  80       Liver Function Studies -   Recent Labs   Lab Test 11/03/16   PROTTOTAL  6.3*   ALBUMIN  3.0  3.0*   BILITOTAL  0.5  0.5   ALKPHOS  68  68   AST  19  19   ALT  29  29       TSH   Date Value Ref Range Status   10/31/2017 3.67 0.40 - 4.00 mU/L Final   09/20/2017 1.13 0.40 - 4.00 mU/L Final       Assessment/Plan:  History of recent fall   Comment: unwitnessed fall, abrasion to head, no significant injury, possible dizziness  Plan:   - High fall risk due to poor safety awareness  - Check BMP  - Check orthostatic BP/HR  - Consider stopping Aricept, side effect of dizziness, will contact health care agent  - Encourage PO fluids   - Consider further w/u of dizziness (i.e. Cardiac or vertigo) after discussion w/ health care agent    Alzheimer's disease of other onset without behavioral disturbance  Comment: Slow progressive cognitive and functional decline with anxious features and agitation/combative behaviors with showering. Remains on Aricept, no change in function or cognition w/ GDR.  Plan:   - Will contact Shayla Mitchell, health care agent regarding stopping Aricept as may contribute to dizziness and may not be adding much benefit at this point in disease process.  - [facility] supportive [facility] for medication administration, meals, ADL assistance, socialization, safety.  - Reviewed non-pharmacological mgmt of difficulty with showers with nursing assistant and nurse, attempt reapproach, sponge bath, finding best time of day, providing privacy, encouraging independence, using warm blankets, etc.  - Start Celexa 5 mg PO daily to target  anxious features and resistance to care. Then attempt to wean Benzo as increases fall risk.     Postablative hypothyroid  Comment: Graves Diease, Treated with methimazole and I-131 in May 2016. Two dose changes over last six months, last TSH 3.67 on 10/31/17  Plan:   - continue levothyroxine 112 mcg daily, on this dose since 08/01/17  - Check TSH w/ BMP next lab day    (I10) Hypertension  Comment: atenolol stopped, lisinopril restarted in July 2017   Plan:   - Continue lisinopril 10 mg daily - if dizziness continues may be able to lower dose  - Monitor labs, VS   - Check BMP next lab day  - Check orthostatic VS    (N40.0) BPH  Comment: no symptoms on Flomax  Plan:  - continue Flomax and monitor clinically     (J30.2) Seasonal allergic rhinitis  Comment: chronic complaint of rhinitis, symptoms stable  Plan:   - Continue daily fluticasone nasal spray     Electronically signed by  Debbie Plata APRN, CNP

## 2018-02-20 ENCOUNTER — ASSISTED LIVING VISIT (OUTPATIENT)
Dept: GERIATRICS | Facility: CLINIC | Age: 78
End: 2018-02-20

## 2018-02-20 DIAGNOSIS — E89.0 POSTABLATIVE HYPOTHYROIDISM: ICD-10-CM

## 2018-02-20 DIAGNOSIS — N40.0 BENIGN PROSTATIC HYPERPLASIA, UNSPECIFIED WHETHER LOWER URINARY TRACT SYMPTOMS PRESENT: ICD-10-CM

## 2018-02-20 DIAGNOSIS — F02.80 ALZHEIMER'S DISEASE OF OTHER ONSET WITHOUT BEHAVIORAL DISTURBANCE: ICD-10-CM

## 2018-02-20 DIAGNOSIS — J30.2 SEASONAL ALLERGIC RHINITIS, UNSPECIFIED CHRONICITY, UNSPECIFIED TRIGGER: ICD-10-CM

## 2018-02-20 DIAGNOSIS — G30.8 ALZHEIMER'S DISEASE OF OTHER ONSET WITHOUT BEHAVIORAL DISTURBANCE: ICD-10-CM

## 2018-02-20 DIAGNOSIS — Z91.81 HISTORY OF RECENT FALL: Primary | ICD-10-CM

## 2018-02-20 DIAGNOSIS — I10 BENIGN ESSENTIAL HYPERTENSION: ICD-10-CM

## 2018-02-21 ENCOUNTER — TRANSFERRED RECORDS (OUTPATIENT)
Dept: HEALTH INFORMATION MANAGEMENT | Facility: CLINIC | Age: 78
End: 2018-02-21

## 2018-02-21 LAB
ANION GAP SERPL CALCULATED.3IONS-SCNC: 9 MMOL/L (ref 3–14)
BUN SERPL-MCNC: 12 MG/DL (ref 7–30)
CALCIUM SERPL-MCNC: 8.8 MG/DL (ref 8.5–10.1)
CHLORIDE SERPLBLD-SCNC: 99 MMOL/L (ref 94–109)
CO2 SERPL-SCNC: 26 MMOL/L (ref 20–32)
CREAT SERPL-MCNC: 0.71 MG/DL (ref 0.66–1.25)
GFR SERPL CREATININE-BSD FRML MDRD: >90 ML/MIN/1.73M2
GLUCOSE SERPL-MCNC: 135 MG/DL (ref 70–99)
POTASSIUM SERPL-SCNC: 3.8 MMOL/L (ref 3.4–5.3)
SODIUM SERPL-SCNC: 134 MMOL/L (ref 133–144)
TSH SERPL-ACNC: 1.32 MU/L (ref 0.4–4)

## 2018-03-08 DIAGNOSIS — F41.9 ANXIETY: Primary | ICD-10-CM

## 2018-03-08 RX ORDER — CITALOPRAM HYDROBROMIDE 10 MG/1
5 TABLET ORAL DAILY
Qty: 60 TABLET | Refills: 98 | Status: SHIPPED | OUTPATIENT
Start: 2018-03-08 | End: 2018-06-12

## 2018-04-17 NOTE — PROGRESS NOTES
"[place] [facility]    Chief Complaint   Patient presents with     RECHECK       HPI:    Elie Marcano is a 77 year old  (1940), who is being seen today for an episodic care visit at [facility].    HPI information obtained from: facility chart records, facility staff, patient report and [facility] chart review.     Today's concern is:  Early onset Alzheimer's disease with behavioral disturbance  Gait disturbance  Nursing request PT/OT evaluation for caregiver traiing due to increased difficulty providing cares. Refuses to shower. Repeats that nothing is wrong with him and he does not need help.  Will not allow staff assist with shaving. Refuses to change his clothes. Can become angry and aggressive when staff attempt to help.   Receives 1 mg ativan once weekly on shower day, but this isn't helping as much as of late.  Does not use assistive device. Shuffling gait. Last fall on 2/18, unwitnessed.    Managed on Celexa 5 mg daily and Aricept 5 mg daily.    REVIEW OF SYSTEMS:  Unobtainable secondary to cognitive impairment.   SLUMS 7/30.    There were no vitals taken for this visit.  GENERAL APPEARANCE:  Alert, in no distress, clean and well dressed, up in activity room.  RESP:  Non-labored breathing   M/S: Ambulates independently without assistive device.   Neuro: CN II-XII grossly intact, no tremor.  PSYCH:  memory impaired, oriented to self only, speech fluent, judgment impaired, affect and mood normal. \"I'm fine!\"     ASSESSMENT/PLAN:  (G30.0,  F02.81) Early onset Alzheimer's disease with behavioral disturbance  (primary encounter diagnosis)  (R26.9) Gait disturbance  Comment: progressive functional and cognitive decline now with increasing behaviors around cares  Plan:   -  PT/OT evaluation for caregiver training and recommendations, consider bathing in room when changing clothes in the morning  - May need to increase Celexa or stop ativan and trial another agent prior to " bathing   - Follow-up with Dr. Dennis next week     Electronically signed by:  STAS Ziegler CNP         Documentation of Face-to-Face and Certification for Home Health Services     Patient: Elie Marcano   YOB: 1940  MR Number: [id number]  Today's Date: 4/17/2018    I certify that patient: Elie Marcano is under my care and that I, or a nurse practitioner or physician's assistant working with me, had a face-to-face encounter that meets the physician face-to-face encounter requirements with this patient on: 04/19/18.    This encounter with the patient was in whole, or in part, for the following medical condition, which is the primary reason for home health care: AD and gait abnormality.    I certify that, based on my findings, the following services are medically necessary home health services: Occupational Therapy and Physical Therapy.    My clinical findings support the need for the above services because: Occupational Therapy Services are needed to assess and treat cognitive ability and address ADL safety due to impairment in cognition. and Physical Therapy Services are needed to assess and treat the following functional impairments: gait, strength, and balance.    Further, I certify that my clinical findings support that this patient is homebound (i.e. absences from home require considerable and taxing effort and are for medical reasons or [facility] orth[facility] services or infrequently or of short duration when for other reasons) because: Mental health symptoms including: Patient gets lost or wanders to due to cognitive impairments...    Based on the above findings. I certify that this patient is confined to the home and needs intermittent skilled nursing care, physical therapy and/or speech therapy.  The patient is under my care, and I have initiated the establishment of the plan of care.  This patient will be followed by a physician who will periodically review the plan of care.  Physician/Provider  to provide follow up care: Debbie Plata    Responsible Medicare certified PECOS Physician: Dr. Sharri Dennis.  Physician Signature: See electronic signature associated with these discharge orders.  Date: 4/17/2018

## 2018-04-19 ENCOUNTER — ASSISTED LIVING VISIT (OUTPATIENT)
Dept: GERIATRICS | Facility: CLINIC | Age: 78
End: 2018-04-19
Payer: COMMERCIAL

## 2018-04-19 DIAGNOSIS — R26.9 GAIT DISTURBANCE: ICD-10-CM

## 2018-04-19 DIAGNOSIS — F02.818 EARLY ONSET ALZHEIMER'S DISEASE WITH BEHAVIORAL DISTURBANCE (H): Primary | ICD-10-CM

## 2018-04-19 DIAGNOSIS — G30.0 EARLY ONSET ALZHEIMER'S DISEASE WITH BEHAVIORAL DISTURBANCE (H): Primary | ICD-10-CM

## 2018-04-26 ENCOUNTER — ASSISTED LIVING VISIT (OUTPATIENT)
Dept: GERIATRICS | Facility: CLINIC | Age: 78
End: 2018-04-26
Payer: COMMERCIAL

## 2018-04-26 VITALS
RESPIRATION RATE: 20 BRPM | SYSTOLIC BLOOD PRESSURE: 140 MMHG | DIASTOLIC BLOOD PRESSURE: 89 MMHG | WEIGHT: 166.6 LBS | TEMPERATURE: 97.9 F | HEART RATE: 68 BPM | OXYGEN SATURATION: 96 %

## 2018-04-26 DIAGNOSIS — I10 BENIGN ESSENTIAL HYPERTENSION: Primary | ICD-10-CM

## 2018-04-26 DIAGNOSIS — F02.818 EARLY ONSET ALZHEIMER'S DISEASE WITH BEHAVIORAL DISTURBANCE (H): ICD-10-CM

## 2018-04-26 DIAGNOSIS — G30.0 EARLY ONSET ALZHEIMER'S DISEASE WITH BEHAVIORAL DISTURBANCE (H): ICD-10-CM

## 2018-04-26 NOTE — PROGRESS NOTES
"Elie Marcano is a 77 year old male seen April 26, 2018 at [facility] unit where he has resided for one and a half years (admit 7/2016) seen to follow up worsening dementia, now with resistance to cares.   Patient is seen on the unit.   Slow to rise from a chair and handhold assist for ambulation.  Needs cuing.  Much more suspicious and irritable today.   Nursing staff reports patient is refusing showers and other hygiene.  Has not had a haircut or shave.    Today he becomes quite agitated with questions, states \"There's nothing wrong with me.\"     Patient has a h/o Graves' disease with toxic multinodular goiter, treated with methimazole and I-131 tx 5/2016 in [place].   Patient reports he sleeps okay.  No dizziness.       Past Medical History   Diagnosis Date     Hypertension      Cognitive impairment      Vitamin D deficiency      Alzheimer disease      Hyperlipidemia      BPH (benign prostatic hyperplasia)      Elevated PSA      Syncope      Thyroid disease      Graves disease, Multinodular goiter  S/p I-131 ablation, 2016     Weight loss      Chronic sinusitis        SH:  Single, no children.   His friend Shayla Mitchell is his primary contact  Patient reports he was raised on [place] farm in [place], has one brother.   Worked as a , and for [employer].     ROS: limited but negative other than above.   SLUMS 7/30    EXAM:  Pleasant, NAD, unkempt  /89  Pulse 68  Temp 97.9  F (36.6  C)  Resp 20  Wt 166 lb 9.6 oz (75.6 kg)  SpO2 96%   Neck supple without adenopathy  Lungs decreased BS, few scattered crackles   Heart RRR s1s2 @80 without ectopy, 1/6 APRIL  Abd soft, NT, no distention, +BS  Ext without edema  Neuro: ambulatory with hand hold assist, short steps without swing-through.   Psych: affect okay    Last Basic Metabolic Panel:  Lab Results   Component Value Date     02/21/2018      Lab Results   Component Value Date    POTASSIUM 3.8 02/21/2018     Lab Results   Component " Value Date    CHLORIDE 99 02/21/2018     Lab Results   Component Value Date    RONALD 8.8 02/21/2018     Lab Results   Component Value Date    CO2 26 02/21/2018     Lab Results   Component Value Date    BUN 12 02/21/2018     Lab Results   Component Value Date    CR 0.71 02/21/2018   GFR >90  Lab Results   Component Value Date     02/21/2018     TSH   Date Value Ref Range Status   02/21/2018 1.32 0.40 - 4.00 mU/L Final       IMP/PLAN:  (E89.0) Postablative hypothyroidism   Comment: s/p I-131 tx in May 2016  Plan:   Continue levothyroxine at current dose.     (I10) Benign essential hypertension    Comment:   BP Readings from Last 3 Encounters:   04/26/18 140/89   12/28/17 127/60   10/26/17 117/63      Plan: continue lisinopril    (G30.0,  F02.81) Early onset Alzheimer's disease with behavioral disturbance  Comment:  Newer suspiciousness and resistance to cares  Plan:  Trial quetiapine 25 mg 30 minutes before bathing and hygiene (instead of prn lorazepam).   Continue citalopram for now.     Will d/c donepezil, likely past any benefit from that at this point.     [facility] support for assist with meds, meals, activity and safety.     (N40.0) Benign prostatic hyperplasia without lower urinary tract symptoms, unspecified morphology  Comment: no current sx  Plan: continue Flomax to avoid urinary retention.       (E55.9) Vitamin D deficiency  Comment:  on replacement  Plan: vit D3 2000 units/day        Sharri Dennis MD

## 2018-05-11 ENCOUNTER — DOCUMENTATION ONLY (OUTPATIENT)
Dept: CARE COORDINATION | Facility: CLINIC | Age: 78
End: 2018-05-11

## 2018-05-11 NOTE — PROGRESS NOTES
Dear Dr. Sharri Dennis  [facility] process is that all ordered disciplines will be involved in the development of the plan of care.  The following disciplines were unable to see Elie Marcano; MRN [id number] within the 5 day evaluation window.  There will be a delay in the evalation visit by  PT      We will notify you when the evaluation is completed.  The patient has been notified.      Sincerely [facility]  Chantale Frank  [phone]

## 2018-05-16 NOTE — PROGRESS NOTES
"[place] [facility]    Chief Complaint   Patient presents with     JORDAN       [facility] Medical Record Number:  [id number]    HPI:    Elie Marcano is a 77 year old  (1940), who is being seen today for an episodic care visit at [facility].  HPI information obtained from: facility chart records, facility staff, patient report and [facility] chart review.    Today's concern is:  .(G30.8) Alzheimer's disease of other onset without behavioral disturbance  Resident of [facility] since July 2016 - moved from [place].   [facility] on admission 11/30 July 2016.   Recent issue difficulty w/ showering and cares. Was using ativan prior to bathing, initially helped with anxiety, but became combative and agitated. Does not like assistance with ADLs, repeats \"nothing is wrong with me\", has thrown water in aide's face. More paranoia on visit the Dr. Dennis at the end of April, ativan stopped and started on Seroquel 25 mg PO daily at 7:30 am. Per Dr. Dennis's note Seroquel was supposed to be ordered PRN prior to showers, but has been added to MAR by nursing as a daily medication. Aricept was also d/c'd last month. Continues on low dose Celexa 5 mg daily. Eating well and weight is stable. OT is now following. No trouble getting resident to bathe this week. Therapist plans to work with staff on approach and non-pharmacological intervention.    (I10) Hypertension  Atenolol was stopped due to low heart rate and BP.  Currently maintained on lisinopril 10 mg daily started in July 2017.  Denies chest pain, palpitations, dizziness, headache.  Recent VS reviewed:  BP Readings from Last 3 Encounters:   05/17/18 144/81   04/26/18 140/89   12/28/17 127/60     Pulse Readings from Last 4 Encounters:   05/17/18 86   04/26/18 68   12/28/17 80   10/26/17 73     Lab Results   Component Value Date    CR 0.71 02/21/2018     (J30.2) Seasonal allergic rhinitis  No itchy or watery eyes. Does not report worsening allergic " symptoms or nasal congestion.  Maintained on Fluticasone nasal spray daily.    ALLERGIES: Review of patient's allergies indicates no known allergies.     Past Medical, Surgical, Family and Social History reviewed and updated in [facility].    Current Outpatient Prescriptions   Medication Sig Dispense Refill     ACETAMINOPHEN PO Take 650 mg by mouth 4 times daily as needed for pain       camphor-menthol (SARNA) 0.5-0.5 % LOTN Apply topically 2 times daily as needed Until rash is cleared, then d/c.        citalopram (CELEXA) 10 MG tablet Take 0.5 tablets (5 mg) by mouth daily 60 tablet 98     FLUTICASONE PROPIONATE, NASAL, NA Spray 1 spray in nostril daily 1 spray daily. For Rhinitis       latanoprost (XALATAN) 0.005 % ophthalmic solution Place 1 drop into both eyes At Bedtime       Levothyroxine Sodium 112 MCG CAPS Take 112 mcg by mouth daily 31 capsule 11     lisinopril (PRINIVIL/ZESTRIL) 10 MG tablet Take 1 tablet (10 mg) by mouth daily 31 tablet 11     Multiple Vitamins-Minerals (CEROVITE SENIOR) TABS Take 1 tablet by mouth daily 30 tablet 98     QUEtiapine Fumarate (SEROQUEL PO) Take 25 mg by mouth daily Before showering       tamsulosin (FLOMAX) 0.4 MG capsule Take 1 capsule (0.4 mg) by mouth daily 31 capsule 98     Vitamin D, Cholecalciferol, 1000 UNITS TABS Take 2,000 Units by mouth daily 62 tablet 98     Medications reviewed:  Medications reconciled to facility chart and changes were made to reflect current medications as identified as above med list. Below are the changes that were made:   Medications stopped since last EPIC medication reconciliation:   There are no discontinued medications.    Medications started since last [facility] medication reconciliation:  No orders of the defined types were placed in this encounter.    REVIEW OF SYSTEMS:  Unobtainable secondary to cognitive impairment.   SLUMS 7/30.    Physical Exam:  /81  Pulse 86  Temp 97.6  F (36.4  C)  Resp 16  Wt 169 lb (76.7 kg)  SpO2  95%  GENERAL APPEARANCE:  Alert, in no distress, clean and well dressed  RESP:  Non-labored breathing  PV: no edema, calves are supple and non-tender  M/S: Unsteady gait without assistive device, decrease clearance BL feet with slightly shuffling gait, furniture walks.  SKIN:  No visible rashes, lesions or ulcerations.   Neuro: CN II-XII grossly intact, no tremor  PSYCH:  memory impaired, oriented to self only, speech fluent, judgment impaired, affect and mood normal today, cooperative and calm, no paranoid affect as at previous visit with Dr. Dennis.     Recent Labs:  CBC RESULTS:   Recent Labs   Lab Test 08/01/17   WBC  4.7   RBC  4.21*   HGB  13.7   HCT  40.1   MCV  95   MCH  32.5   MCHC  34.2   RDW  14.2   PLT  170       Last Basic Metabolic Panel:  Recent Labs   Lab Test 02/21/18 08/01/17   NA  134  135   POTASSIUM  3.8  3.8   CHLORIDE  99  101   RONALD  8.8  8.5   CO2  26  27   BUN  12  16   CR  0.71  0.86   GLC  135*  87       Assessment/Plan:  (G30.8,  F02.80) Alzheimer's disease of other onset without behavioral disturbance  (primary encounter diagnosis)  Comment: Started on daily Seroquel last month, seems to have helped paranoia, combative behaviors, and resistance to care. Now working with OT.Tolerated weaning Aricept without issue.  Plan:   - Continue Seroquel 25 mg PO daily started 4/26/18. Benefit of medication to safety and QoL is greater than risk a this time. Plan for interval assessment of target symptoms at routine follow and to attempt GDR as possible to find lowest effective dose.  - Continue Celexa 5 mg daily, consider increasing to 10 mg at next visit to facilitate GDR of Seroquel  - Continue  OT supports and staff training to optimize non-pharmacological interventions for resistance to cares and maximize resident independence.    (I10) Benign essential hypertension  Comment: BP stable on current regimen  Plan:  - Continue lisinopril 10 mg PO daily   - Monitor routine VS and periodic  labs    (J30.2) Seasonal allergic rhinitis, unspecified chronicity, unspecified trigger  Comment: stable on current regimen  Plan:   - Continue daily Flonase     Electronically signed by  STAS Ziegler CNP

## 2018-05-17 ENCOUNTER — ASSISTED LIVING VISIT (OUTPATIENT)
Dept: GERIATRICS | Facility: CLINIC | Age: 78
End: 2018-05-17
Payer: COMMERCIAL

## 2018-05-17 VITALS
TEMPERATURE: 97.6 F | WEIGHT: 169 LBS | SYSTOLIC BLOOD PRESSURE: 144 MMHG | DIASTOLIC BLOOD PRESSURE: 81 MMHG | HEART RATE: 86 BPM | RESPIRATION RATE: 16 BRPM | OXYGEN SATURATION: 95 %

## 2018-05-17 DIAGNOSIS — I10 BENIGN ESSENTIAL HYPERTENSION: ICD-10-CM

## 2018-05-17 DIAGNOSIS — J30.2 SEASONAL ALLERGIC RHINITIS, UNSPECIFIED CHRONICITY, UNSPECIFIED TRIGGER: ICD-10-CM

## 2018-05-17 DIAGNOSIS — F02.80 ALZHEIMER'S DISEASE OF OTHER ONSET WITHOUT BEHAVIORAL DISTURBANCE: Primary | ICD-10-CM

## 2018-05-17 DIAGNOSIS — G30.8 ALZHEIMER'S DISEASE OF OTHER ONSET WITHOUT BEHAVIORAL DISTURBANCE: Primary | ICD-10-CM

## 2018-05-29 DIAGNOSIS — H40.003 GLAUCOMA SUSPECT, BILATERAL: Primary | ICD-10-CM

## 2018-06-06 ENCOUNTER — APPOINTMENT (OUTPATIENT)
Dept: GENERAL RADIOLOGY | Facility: CLINIC | Age: 78
End: 2018-06-06
Attending: EMERGENCY MEDICINE
Payer: COMMERCIAL

## 2018-06-06 ENCOUNTER — HOSPITAL ENCOUNTER (EMERGENCY)
Facility: CLINIC | Age: 78
Discharge: NURSING, REHAB OR LONG-TERM CARE | End: 2018-06-06
Attending: EMERGENCY MEDICINE | Admitting: EMERGENCY MEDICINE
Payer: COMMERCIAL

## 2018-06-06 VITALS
OXYGEN SATURATION: 100 % | SYSTOLIC BLOOD PRESSURE: 139 MMHG | TEMPERATURE: 97.6 F | DIASTOLIC BLOOD PRESSURE: 72 MMHG | RESPIRATION RATE: 19 BRPM

## 2018-06-06 DIAGNOSIS — R55 SYNCOPE, UNSPECIFIED SYNCOPE TYPE: ICD-10-CM

## 2018-06-06 LAB
ALBUMIN SERPL-MCNC: 3.2 G/DL (ref 3.4–5)
ALP SERPL-CCNC: 69 U/L (ref 40–150)
ALT SERPL W P-5'-P-CCNC: 19 U/L (ref 0–70)
ANION GAP SERPL CALCULATED.3IONS-SCNC: 9 MMOL/L (ref 3–14)
AST SERPL W P-5'-P-CCNC: 19 U/L (ref 0–45)
BASOPHILS # BLD AUTO: 0 10E9/L (ref 0–0.2)
BASOPHILS NFR BLD AUTO: 0.4 %
BILIRUB SERPL-MCNC: 0.4 MG/DL (ref 0.2–1.3)
BUN SERPL-MCNC: 20 MG/DL (ref 7–30)
CALCIUM SERPL-MCNC: 8.9 MG/DL (ref 8.5–10.1)
CHLORIDE SERPL-SCNC: 99 MMOL/L (ref 94–109)
CK SERPL-CCNC: 54 U/L (ref 30–300)
CO2 SERPL-SCNC: 28 MMOL/L (ref 20–32)
CREAT SERPL-MCNC: 0.9 MG/DL (ref 0.66–1.25)
DIFFERENTIAL METHOD BLD: ABNORMAL
EOSINOPHIL # BLD AUTO: 0.3 10E9/L (ref 0–0.7)
EOSINOPHIL NFR BLD AUTO: 5.7 %
ERYTHROCYTE [DISTWIDTH] IN BLOOD BY AUTOMATED COUNT: 14.4 % (ref 10–15)
GFR SERPL CREATININE-BSD FRML MDRD: 82 ML/MIN/1.7M2
GLUCOSE SERPL-MCNC: 114 MG/DL (ref 70–99)
HCT VFR BLD AUTO: 39.7 % (ref 40–53)
HGB BLD-MCNC: 13.3 G/DL (ref 13.3–17.7)
IMM GRANULOCYTES # BLD: 0 10E9/L (ref 0–0.4)
IMM GRANULOCYTES NFR BLD: 0.2 %
INTERPRETATION ECG - MUSE: NORMAL
LYMPHOCYTES # BLD AUTO: 1.7 10E9/L (ref 0.8–5.3)
LYMPHOCYTES NFR BLD AUTO: 37.4 %
MCH RBC QN AUTO: 32.4 PG (ref 26.5–33)
MCHC RBC AUTO-ENTMCNC: 33.5 G/DL (ref 31.5–36.5)
MCV RBC AUTO: 97 FL (ref 78–100)
MONOCYTES # BLD AUTO: 0.6 10E9/L (ref 0–1.3)
MONOCYTES NFR BLD AUTO: 12.6 %
NEUTROPHILS # BLD AUTO: 2 10E9/L (ref 1.6–8.3)
NEUTROPHILS NFR BLD AUTO: 43.7 %
PLATELET # BLD AUTO: 202 10E9/L (ref 150–450)
POTASSIUM SERPL-SCNC: 3.9 MMOL/L (ref 3.4–5.3)
PROT SERPL-MCNC: 7 G/DL (ref 6.8–8.8)
RBC # BLD AUTO: 4.11 10E12/L (ref 4.4–5.9)
SODIUM SERPL-SCNC: 136 MMOL/L (ref 133–144)
TROPONIN I SERPL-MCNC: <0.015 UG/L (ref 0–0.04)
WBC # BLD AUTO: 4.5 10E9/L (ref 4–11)

## 2018-06-06 PROCEDURE — 85025 COMPLETE CBC W/AUTO DIFF WBC: CPT | Performed by: EMERGENCY MEDICINE

## 2018-06-06 PROCEDURE — 99285 EMERGENCY DEPT VISIT HI MDM: CPT | Mod: 25

## 2018-06-06 PROCEDURE — 25000128 H RX IP 250 OP 636: Performed by: EMERGENCY MEDICINE

## 2018-06-06 PROCEDURE — 96360 HYDRATION IV INFUSION INIT: CPT

## 2018-06-06 PROCEDURE — 71046 X-RAY EXAM CHEST 2 VIEWS: CPT

## 2018-06-06 PROCEDURE — 93005 ELECTROCARDIOGRAM TRACING: CPT

## 2018-06-06 PROCEDURE — 84484 ASSAY OF TROPONIN QUANT: CPT | Performed by: EMERGENCY MEDICINE

## 2018-06-06 PROCEDURE — 82550 ASSAY OF CK (CPK): CPT | Performed by: EMERGENCY MEDICINE

## 2018-06-06 PROCEDURE — 80053 COMPREHEN METABOLIC PANEL: CPT | Performed by: EMERGENCY MEDICINE

## 2018-06-06 RX ORDER — SODIUM CHLORIDE, SODIUM LACTATE, POTASSIUM CHLORIDE, CALCIUM CHLORIDE 600; 310; 30; 20 MG/100ML; MG/100ML; MG/100ML; MG/100ML
1000 INJECTION, SOLUTION INTRAVENOUS CONTINUOUS
Status: DISCONTINUED | OUTPATIENT
Start: 2018-06-06 | End: 2018-06-06 | Stop reason: HOSPADM

## 2018-06-06 RX ADMIN — SODIUM CHLORIDE, POTASSIUM CHLORIDE, SODIUM LACTATE AND CALCIUM CHLORIDE 1000 ML: 600; 310; 30; 20 INJECTION, SOLUTION INTRAVENOUS at 11:30

## 2018-06-06 ASSESSMENT — ENCOUNTER SYMPTOMS: FEVER: 0

## 2018-06-06 NOTE — ED NOTES
Bed: ED15  Expected date:   Expected time:   Means of arrival:   Comments:  regino - 511 - 77M syncope eta 1050

## 2018-06-06 NOTE — ED AVS SNAPSHOT
Emergency Department    [address] [place] [address]    Phone:  [phone]    Fax:  [phone]                                       Elie Marcano   MRN: [id number]    Department:   Emergency Department   Date of Visit:  6/6/2018           After Visit Summary Signature Page     I have received my discharge instructions, and my questions have been answered. I have discussed any challenges I see with this plan with the nurse or doctor.    ..........................................................................................................................................  Patient/Patient Representative Signature      ..........................................................................................................................................  Patient Representative Print Name and Relationship to Patient    ..................................................               ................................................  Date                                            Time    ..........................................................................................................................................  Reviewed by Signature/Title    ...................................................              ..............................................  Date                                                            Time

## 2018-06-06 NOTE — DISCHARGE INSTRUCTIONS
Fainting: Uncertain Cause  Fainting (syncope) is a temporary loss of consciousness, which is often associated with a loss of postural tone. There are other causes of fainting, too. It s also called passing out. It occurs when blood flow to the brain is less than normal. Near-fainting (near-syncope) is very similar to fainting, but you don t fully pass out.  Common minor causes of fainting include:    Sudden fear    Pain    Nausea    Emotional stress    Overexertion  Suddenly standing up after sitting or lying for a long time can also cause fainting.  More serious causes of fainting include:    Very slow or very fast heartbeat (arrhythmia)    Other types of heart disease, such as heart valve disease or coronary artery disease    Dehydration    Loss of blood    Seizure    Stroke    Ruptured blood vessel in the brain  Taking too much high blood pressure medicine can also cause low blood pressure and fainting.  Your healthcare provider does not know the exact cause of your fainting. But the tests today did not show any of the serious causes of fainting. Sometimes you may need more tests to find out if you have a serious problem. That s why it s important to follow up with your provider as advised.  Home care  Follow these guidelines when caring for yourself at home:    Rest today. You may go back to your normal activities when you are feeling back to normal. It is best to stay with someone who can check on you for the next 24 hours to watch for another episode of fainting.    If you become lightheaded or dizzy, lie down right away and try to prop your feet above the level of your head. Or sit with your head between your knees.    Because the provider doesn t know the exact cause of your fainting or near-fainting spell, it s possible for you to have another spell without warning. Because of this, don t drive a car or operate dangerous equipment. Don t take a bath alone. Use a shower instead. Don t swim alone until your  healthcare provider says that you are no longer in danger of having another fainting spell.  Follow-up care  Follow up with your healthcare provider, or as advised.  Call 911  Call 911 if any of these occur:    Another fainting spell that s not explained by the common causes listed above    Pain in your chest, arm, neck, jaw, back, or abdomen    Shortness of breath    Severe headache or seizure    Blood in vomit or stools (black or red color)    Unexpected vaginal bleeding    Your heart beats very rapidly, very slowly, or irregularly (palpitations)    Weakness in an arm or leg or on one side of the face    Difficulty speaking or seeing    Extreme drowsiness, confusion, dizziness, or fainting  Date Last Reviewed: 12/1/2016 2000-2017 [employer]. [address] [place] [address], [place] [address]. All rights reserved. This information is not intended as a substitute for professional medical care. Always follow your healthcare professional's instructions.

## 2018-06-06 NOTE — ED PROVIDER NOTES
History     Chief Complaint:  Syncopal event    The history is provided by the patient. The history is limited by the condition of the patient (history of dementia).      Elie Marcano is a 77 year old male with a history of cognitive impairment, dementia, alzheimer's disease, hyperlipidemia, HTN, and syncope who presents to the emergency department for evaluation of a syncopal event. Of note, the patient is oriented to self at baseline. Earlier this morning while at the [employer] at the patient's assisted living facility, the patient was reportedly noted to be pale and shaky. He was assisted back to his apartment, where he then became hypotensive and possibly has a syncopal episode. When the patient came to again, he was aggressive with staff. His syncopal event prompted EMS to be called, with whom the patient was cooperative with. Patient was then transported to here to the emergency department for evaluation.    Here, the patient presents confused. He states is unaware of why he is here. He notes he lives in [place] and that he grew up in [place], [place]. He denies loss of consciousness or fever.     Allergies:  NKDA     Medications:    Sarna  Celexa  Fluticasone  Xalatan  Levothyroxine  Lisinopril  Seroquel  Tamsulosin    Problem List:    BPH  Hypothyroidism  Alzheimer's disease  Dementia     Past Medical History:    Alzheimer's disease  BPH  Chronic sinusitis  Cognitive impairment  Elevated PSA  Hyperlipidemia  HTN  Syncope  Thyroid disease  Vitamin D deficiency    Past Surgical History:    Appendectomy    Family History:    No past pertinent family history.    Social History:  Negative for tobacco use.  Negative for alcohol use.  Patient resides in an assisted living facility  Marital Status:  Single      Review of Systems   Unable to perform ROS: Dementia   Constitutional: Negative for fever.   Neurological: Negative for syncope (Patient denies this).     Physical Exam     Physical Exam  Patient Vitals for  the past 24 hrs:   BP Temp Temp src Heart Rate Resp SpO2   06/06/18 1230 139/72 - - 65 19 -   06/06/18 1200 125/64 - - 55 15 100 %   06/06/18 1103 119/53 97.6  F (36.4  C) Oral 70 16 99 %       General: Awake and alert, following commands but confused. Not a reliable historian.  Head: No signs of trauma.   Mouth/Throat: Oropharynx is clear and moist.   Eyes: Conjunctivae are normal. Pupils are equal, round, and reactive to light.   Neck: Normal range of motion. No nuchal rigidity.   CV: Normal rate and regular rhythm.    Resp: Effort normal and breath sounds normal. No respiratory distress.   GI: Soft. There is no tenderness or guarding.   MSK: Normal range of motion. no edema.   Neuro: The patient is alert and oriented to person, place, and time.  PERRLA, EOMI, strength in upper/lower extremities normal and symmetrical.   Sensation normal. Speech normal.  GCS eye subscore is 4. GCS verbal subscore is 5. GCS motor subscore is 6.   Skin: Skin is warm and dry. No rash noted.   Psych: normal mood and affect. behavior is normal.     Emergency Department Course   ECG:  Indication: syncopal event   Time: 1119  Vent. Rate 57 bpm. HI interval 170. QRS duration 84. QT/QTc 426/414. P-R-T axis 69 46 56. Sinus bradycardia. Otherwise normal ECG. Agrees with computer interpretation. Read time: 1130    Imaging:  Radiographic findings were communicated with the patient who voiced understanding of the findings.    XR Chest 2 views:   Cardiac silhouette and pulmonary vasculature are within  normal limits. No focal airspace disease, pleural effusion or  pneumothorax. As per radiology.     Laboratory:  CBC: WBC: 4.5, HGB: 13.3, PLT: 202  CK total: 54  1110 Troponin: <0.015  CMP: Glucose 114 (H), Albumin 3.2 (L), o/w WNL (Creatinine: 0.90)    Interventions:  1130 NS 1L IV    Emergency Department Course:  1200 Nursing notes and vitals reviewed.  I performed an exam of the patient as documented above.     IV inserted. Medicine  administered as documented above. Blood drawn. This was sent to the lab for further testing, results above.    EKG obtained in the ED, see results above.     The patient was placed on continuous cardiac monitoring while here in the ED.      The patient was sent for a chest xray while in the emergency department, findings above.     1245 I rechecked the patient and discussed the results of his workup thus far.     Findings and plan explained to the Patient. Patient discharged home with instructions regarding supportive care, medications, and reasons to return. The importance of close follow-up was reviewed.     I personally reviewed the laboratory results with the Patient and answered all related questions prior to discharge.   Impression & Plan    Medical Decision Making:  Elie Marcano is a 77 year old male who presents with a history and clinical exam consistent with syncope.  While orthostatic hypotension was the most likely etiology given the history of this patient, I considered a broad differential for their syncope today including cardiac arrhythmia, ACS, aortic stenosis, HOCM, PE, orthostatic hypotension, drugs, situational, carotid hypersensitivity, seizure, TIA, stroke, vasovagal.   He has no signs of a concerning etiology for syncope at this point.  In addition,he has no family history of sudden death, no chest pain, no seizure activity or post-ictal period, no murmur, and no signs of orthostasis in the ED, no focal neurologic symptoms, and no complaints of concerning headache.  The workup in the ED is negative and the physical exam is re-assurring.    Critical Care time:  none    Diagnosis:    ICD-10-CM    1. Syncope, unspecified syncope type R55        Disposition:  discharged to home    Scribe Disclosure:  I, Carmen Bustamante, am serving as a scribe on 6/6/2018 at 12:08 PM to personally document services performed by Hu Bynum MD based on my observations and the provider's statements to me.      Carmen Bustamante  6/6/2018    EMERGENCY DEPARTMENT       JoinHu peña MD  06/07/18 1472

## 2018-06-06 NOTE — ED AVS SNAPSHOT
Emergency Department    [address] [facility] [address]    Phone:  [phone]    Fax:  [phone]                                       Elie Marcano   MRN: [id number]    Department:   Emergency Department   Date of Visit:  6/6/2018           Patient Information     Date Of Birth          1940        Your diagnoses for this visit were:     Syncope, unspecified syncope type        You were seen by Hu Bynum MD.      Follow-up Information     Follow up with  Emergency Department Today.    Specialty:  EMERGENCY MEDICINE    Why:  If symptoms return    Contact information:    [address] [facility] [id number] [phone]        Follow up with Debbie Plata APRN CNP In 1 day.    Specialty:  Nurse Practitioner - Adult Health    Why:  for an ED followup check    Contact information:    [address]  [facility] [address]  [phone]          Discharge Instructions         Fainting: Uncertain Cause  Fainting (syncope) is a temporary loss of consciousness, which is often associated with a loss of postural tone. There are other causes of fainting, too. It s also called passing out. It occurs when blood flow to the brain is less than normal. Near-fainting (near-syncope) is very similar to fainting, but you don t fully pass out.  Common minor causes of fainting include:    Sudden fear    Pain    Nausea    Emotional stress    Overexertion  Suddenly standing up after sitting or lying for a long time can also cause fainting.  More serious causes of fainting include:    Very slow or very fast heartbeat (arrhythmia)    Other types of heart disease, such as heart valve disease or coronary artery disease    Dehydration    Loss of blood    Seizure    Stroke    Ruptured blood vessel in the brain  Taking too much high blood pressure medicine can also cause low blood pressure and fainting.  Your healthcare provider does not know the exact cause of your fainting.  But the tests today did not show any of the serious causes of fainting. Sometimes you may need more tests to find out if you have a serious problem. That s why it s important to follow up with your provider as advised.  Home care  Follow these guidelines when caring for yourself at home:    Rest today. You may go back to your normal activities when you are feeling back to normal. It is best to stay with someone who can check on you for the next 24 hours to watch for another episode of fainting.    If you become lightheaded or dizzy, lie down right away and try to prop your feet above the level of your head. Or sit with your head between your knees.    Because the provider doesn t know the exact cause of your fainting or near-fainting spell, it s possible for you to have another spell without warning. Because of this, don t drive a car or operate dangerous equipment. Don t take a bath alone. Use a shower instead. Don t swim alone until your healthcare provider says that you are no longer in danger of having another fainting spell.  Follow-up care  Follow up with your healthcare provider, or as advised.  Call 911  Call 911 if any of these occur:    Another fainting spell that s not explained by the common causes listed above    Pain in your chest, arm, neck, jaw, back, or abdomen    Shortness of breath    Severe headache or seizure    Blood in vomit or stools (black or red color)    Unexpected vaginal bleeding    Your heart beats very rapidly, very slowly, or irregularly (palpitations)    Weakness in an arm or leg or on one side of the face    Difficulty speaking or seeing    Extreme drowsiness, confusion, dizziness, or fainting  Date Last Reviewed: 12/1/2016 2000-2017 [employer]. [address] [place] [address]. All rights reserved. This information is not intended as a substitute for professional medical care. Always follow your healthcare professional's instructions.          24 Hour  Appointment Hotline       To make an appointment at any [facility], call [phone] ([phone]). If you don't have a family doctor or clinic, we will help you find one. [facility] clinics are conveniently located to serve the needs of you and your family.             Review of your medicines      Our records show that you are taking the medicines listed below. If these are incorrect, please call your family doctor or clinic.        Dose / Directions Last dose taken    ACETAMINOPHEN PO   Dose:  650 mg        Take 650 mg by mouth 4 times daily as needed for pain   Refills:  0        CEROVITE SENIOR Tabs   Dose:  1 tablet   Indication:  supplement   Quantity:  30 tablet        Take 1 tablet by mouth daily   Refills:  98        citalopram 10 MG tablet   Commonly known as:  celeXA   Dose:  5 mg   Quantity:  60 tablet        Take 0.5 tablets (5 mg) by mouth daily   Refills:  98        FLUTICASONE PROPIONATE (NASAL) NA   Dose:  1 spray        Spray 1 spray in nostril daily 1 spray daily. For Rhinitis   Refills:  0        latanoprost 0.005 % ophthalmic solution   Commonly known as:  XALATAN   Dose:  1 drop        Place 1 drop into both eyes At Bedtime   Refills:  0        Levothyroxine Sodium 112 MCG Caps   Dose:  112 mcg   Indication:  Underactive Thyroid   Quantity:  31 capsule        Take 112 mcg by mouth daily   Refills:  11        lisinopril 10 MG tablet   Commonly known as:  PRINIVIL/ZESTRIL   Dose:  10 mg   Indication:  High Blood Pressure Disorder   Quantity:  31 tablet        Take 1 tablet (10 mg) by mouth daily   Refills:  11        QUEtiapine 25 MG tablet   Commonly known as:  SEROQUEL   Dose:  25 mg   Quantity:  31 tablet        Take 1 tablet (25 mg) by mouth daily   Refills:  98        SARNA 0.5-0.5 % Lotn   Generic drug:  camphor-menthol        Apply topically 2 times daily as needed Until rash is cleared, then d/c.   Refills:  0        tamsulosin 0.4 MG capsule   Commonly known as:  FLOMAX   Dose:   0.4 mg   Quantity:  31 capsule        Take 1 capsule (0.4 mg) by mouth daily   Refills:  98        Vitamin D (Cholecalciferol) 1000 units Tabs   Dose:  2000 Units   Quantity:  62 tablet        Take 2,000 Units by mouth daily   Refills:  98                Procedures and tests performed during your visit     CBC with platelets differential    CK total    Cardiac Continuous Monitoring    Comprehensive metabolic panel    EKG 12-lead, tracing only    Troponin I    XR Chest 2 Views      Orders Needing Specimen Collection     None      Pending Results     No orders found from 6/4/2018 to 6/7/2018.            Pending Culture Results     No orders found from 6/4/2018 to 6/7/2018.            Pending Results Instructions     If you had any lab results that were not finalized at the time of your Discharge, you can call the ED Lab Result RN at [phone]. You will be contacted by this team for any positive Lab results or changes in treatment. The nurses are available 7 days a week from 10A to 6:30P.  You can leave a message 24 hours per day and they will return your call.        Test Results From Your Hospital Stay        6/6/2018 11:43 AM      Component Results     Component Value Ref Range & Units Status    WBC 4.5 4.0 - 11.0 10e9/L Final    RBC Count 4.11 (L) 4.4 - 5.9 10e12/L Final    Hemoglobin 13.3 13.3 - 17.7 g/dL Final    Hematocrit 39.7 (L) 40.0 - 53.0 % Final    MCV 97 78 - 100 fl Final    MCH 32.4 26.5 - 33.0 pg Final    MCHC 33.5 31.5 - 36.5 g/dL Final    RDW 14.4 10.0 - 15.0 % Final    Platelet Count 202 150 - 450 10e9/L Final    Diff Method Automated Method  Final    % Neutrophils 43.7 % Final    % Lymphocytes 37.4 % Final    % Monocytes 12.6 % Final    % Eosinophils 5.7 % Final    % Basophils 0.4 % Final    % Immature Granulocytes 0.2 % Final    Absolute Neutrophil 2.0 1.6 - 8.3 10e9/L Final    Absolute Lymphocytes 1.7 0.8 - 5.3 10e9/L Final    Absolute Monocytes 0.6 0.0 - 1.3 10e9/L Final    Absolute  Eosinophils 0.3 0.0 - 0.7 10e9/L Final    Absolute Basophils 0.0 0.0 - 0.2 10e9/L Final    Abs Immature Granulocytes 0.0 0 - 0.4 10e9/L Final         6/6/2018 11:51 AM      Component Results     Component Value Ref Range & Units Status    Sodium 136 133 - 144 mmol/L Final    Potassium 3.9 3.4 - 5.3 mmol/L Final    Chloride 99 94 - 109 mmol/L Final    Carbon Dioxide 28 20 - 32 mmol/L Final    Anion Gap 9 3 - 14 mmol/L Final    Glucose 114 (H) 70 - 99 mg/dL Final    Urea Nitrogen 20 7 - 30 mg/dL Final    Creatinine 0.90 0.66 - 1.25 mg/dL Final    GFR Estimate 82 >60 mL/min/1.7m2 Final    Non  GFR Calc    GFR Estimate If Black >90 >60 mL/min/1.7m2 Final    African American GFR Calc    Calcium 8.9 8.5 - 10.1 mg/dL Final    Bilirubin Total 0.4 0.2 - 1.3 mg/dL Final    Albumin 3.2 (L) 3.4 - 5.0 g/dL Final    Protein Total 7.0 6.8 - 8.8 g/dL Final    Alkaline Phosphatase 69 40 - 150 U/L Final    ALT 19 0 - 70 U/L Final    AST 19 0 - 45 U/L Final         6/6/2018 11:51 AM      Component Results     Component Value Ref Range & Units Status    Troponin I ES <0.015 0.000 - 0.045 ug/L Final    The 99th percentile for upper reference range is 0.045 ug/L.  Troponin values   in the range of 0.045 - 0.120 ug/L may be associated with risks of adverse   clinical events.           6/6/2018 12:33 PM      Narrative     XR CHEST 2 VW 6/6/2018 11:49 AM    COMPARISON: None.    HISTORY: Syncope.        Impression     IMPRESSION: Cardiac silhouette and pulmonary vasculature are within  normal limits. No focal airspace disease, pleural effusion or  pneumothorax.    BRUNO ROACH MD         6/6/2018 11:51 AM      Component Results     Component Value Ref Range & Units Status    CK Total 54 30 - 300 U/L Final                Clinical Quality Measure: Blood Pressure Screening     Your blood pressure was checked while you were in the emergency department today. The last reading we obtained was  BP: 139/72 . Please read the  "guidelines below about what these numbers mean and what you should do about them.  If your systolic blood pressure (the top number) is less than 120 and your diastolic blood pressure (the bottom number) is less than 80, then your blood pressure is normal. There is nothing more that you need to do about it.  If your systolic blood pressure (the top number) is 120-139 or your diastolic blood pressure (the bottom number) is 80-89, your blood pressure may be higher than it should be. You should have your blood pressure rechecked within a year by a primary care provider.  If your systolic blood pressure (the top number) is 140 or greater or your diastolic blood pressure (the bottom number) is 90 or greater, you may have high blood pressure. High blood pressure is treatable, but if left untreated over time it can put you at risk for heart attack, stroke, or kidney failure. You should have your blood pressure rechecked by a primary care provider within the next 4 weeks.  If your provider in the emergency department today gave you specific instructions to follow-up with your doctor or provider even sooner than that, you should follow that instruction and not wait for up to 4 weeks for your follow-up visit.        Thank you for choosing [facility]       Thank you for choosing [facility] for your care. Our goal is always to provide you with excellent care. Hearing back from our patients is one way we can continue to improve our services. Please take a few minutes to complete the written survey that you may receive in the mail after you visit with us. Thank you!        Monitor My [facility] Information     [facility] lets you send messages to your doctor, view your test results, renew your prescriptions, schedule appointments and more. To sign up, go to [email or web address] . Click on \"Log in\" on the left side of the screen, which will take you to the Welcome page. Then click on \"Sign up Now\" on the right side of the page.     You will be " asked to enter the access code listed below, as well as some personal information. Please follow the directions to create your username and password.     Your access code is: [email or web address]  Expires: 2018  1:11 PM     Your access code will  in 90 days. If you need help or a new code, please call your [facility] or [phone].        Care EveryWhere ID     This is your Care EveryWhere ID. This could be used by other organizations to access your [facility] medical records  [id number]        Equal Access to Services     [facility]: Alcira Lagunas, jesús jaramillo, freddy omalley, joshua weiss . So [facility] [phone].    ATENCIÓN: Si habla español, tiene a vargas disposición servicios gratuitos de asistencia lingüística. Ankush al [phone].    We comply with applicable federal civil rights laws and [place] laws. We do not discriminate on the basis of race, color, national origin, age, disability, sex, sexual orientation, or gender identity.            After Visit Summary       This is your record. Keep this with you and show to your community pharmacist(s) and doctor(s) at your next visit.

## 2018-06-11 ENCOUNTER — HOSPITAL ENCOUNTER (EMERGENCY)
Facility: CLINIC | Age: 78
Discharge: HOME | End: 2018-06-11
Attending: EMERGENCY MEDICINE | Admitting: EMERGENCY MEDICINE
Payer: COMMERCIAL

## 2018-06-11 VITALS
WEIGHT: 170 LBS | SYSTOLIC BLOOD PRESSURE: 123 MMHG | TEMPERATURE: 98.1 F | OXYGEN SATURATION: 97 % | HEART RATE: 60 BPM | RESPIRATION RATE: 11 BRPM | DIASTOLIC BLOOD PRESSURE: 60 MMHG

## 2018-06-11 DIAGNOSIS — R55 PRE-SYNCOPE: ICD-10-CM

## 2018-06-11 LAB
ANION GAP SERPL CALCULATED.3IONS-SCNC: 7 MMOL/L (ref 3–14)
BUN SERPL-MCNC: 13 MG/DL (ref 7–30)
CALCIUM SERPL-MCNC: 8.5 MG/DL (ref 8.5–10.1)
CHLORIDE SERPL-SCNC: 103 MMOL/L (ref 94–109)
CO2 SERPL-SCNC: 28 MMOL/L (ref 20–32)
CREAT SERPL-MCNC: 0.89 MG/DL (ref 0.66–1.25)
ERYTHROCYTE [DISTWIDTH] IN BLOOD BY AUTOMATED COUNT: 14.4 % (ref 10–15)
GFR SERPL CREATININE-BSD FRML MDRD: 83 ML/MIN/1.7M2
GLUCOSE SERPL-MCNC: 117 MG/DL (ref 70–99)
HCT VFR BLD AUTO: 36.3 % (ref 40–53)
HGB BLD-MCNC: 12 G/DL (ref 13.3–17.7)
INTERPRETATION ECG - MUSE: NORMAL
MCH RBC QN AUTO: 32.1 PG (ref 26.5–33)
MCHC RBC AUTO-ENTMCNC: 33.1 G/DL (ref 31.5–36.5)
MCV RBC AUTO: 97 FL (ref 78–100)
PLATELET # BLD AUTO: 160 10E9/L (ref 150–450)
POTASSIUM SERPL-SCNC: 3.8 MMOL/L (ref 3.4–5.3)
RBC # BLD AUTO: 3.74 10E12/L (ref 4.4–5.9)
SODIUM SERPL-SCNC: 138 MMOL/L (ref 133–144)
TROPONIN I SERPL-MCNC: <0.015 UG/L (ref 0–0.04)
WBC # BLD AUTO: 5.3 10E9/L (ref 4–11)

## 2018-06-11 PROCEDURE — 25000128 H RX IP 250 OP 636: Performed by: EMERGENCY MEDICINE

## 2018-06-11 PROCEDURE — 93005 ELECTROCARDIOGRAM TRACING: CPT

## 2018-06-11 PROCEDURE — 85027 COMPLETE CBC AUTOMATED: CPT | Performed by: EMERGENCY MEDICINE

## 2018-06-11 PROCEDURE — 96360 HYDRATION IV INFUSION INIT: CPT

## 2018-06-11 PROCEDURE — 84484 ASSAY OF TROPONIN QUANT: CPT | Performed by: EMERGENCY MEDICINE

## 2018-06-11 PROCEDURE — 80048 BASIC METABOLIC PNL TOTAL CA: CPT | Performed by: EMERGENCY MEDICINE

## 2018-06-11 PROCEDURE — 99284 EMERGENCY DEPT VISIT MOD MDM: CPT | Mod: 25

## 2018-06-11 RX ADMIN — SODIUM CHLORIDE 1000 ML: 9 INJECTION, SOLUTION INTRAVENOUS at 12:43

## 2018-06-11 ASSESSMENT — ENCOUNTER SYMPTOMS: ABDOMINAL PAIN: 0

## 2018-06-11 NOTE — ED PROVIDER NOTES
History     Chief Complaint:  Near Syncope    HPI   Elie Marcano is a 77 year old male with a history of hypertension and hyperlipidemia who presents via EMS for evaluation of near syncope. EMS reports that when they got to the patient, he was almost syncopal, but it was noted that he never did lose consciousness. The patient reports that he does not remember the event, but he denies abdominal pain or other pain. He states that he feels normal in the ED. History limited from patient due to dementia. No report of any illness/fever. No report of chest pain/sob.    Allergies:  No known drug allergies    Medications:    Celexa  Xalatan  Lisinopril  Seroquel  Flomax  Vitamin D    Past Medical History:    Alzheimer disease  BPH  Chronic sinusitis  Cognitive impairment  Elevated PSA  Hyperlipidemia  Hypertension  Syncope  Thyroid disease  Vitamin D deficiency      Past Surgical History:    Appendectomy     Family History:    History reviewed. No pertinent family history.     Social History:  Smoking status: Never smoker  Alcohol use: No  Marital Status:  Single [1]    Review of Systems   Gastrointestinal: Negative for abdominal pain.   Neurological: Positive for syncope (Near).   All other systems reviewed and are negative.    Physical Exam     Patient Vitals for the past 24 hrs:   BP Temp Temp src Pulse Heart Rate Resp SpO2 Weight   06/11/18 1333 123/60 - - - - - - -   06/11/18 1300 123/85 - - - 61 - - -   06/11/18 1244 127/62 - - - - - - -   06/11/18 1237 113/58 - - - - - - -   06/11/18 1230 (!) 87/54 - - - - - - -   06/11/18 1200 - - - - 59 11 - -   06/11/18 1130 110/57 - - - - - - -   06/11/18 1101 103/58 98.1  F (36.7  C) Oral 60 60 14 97 % 77.1 kg (170 lb)   06/11/18 1100 103/58 - - - - - - -       Physical Exam  General: Resting comfortably on the gurney  Eyes:  The pupils are equal and round    Conjunctivae and sclerae are normal  ENT:    Moist mucous membranes  Neck:  Normal range of motion  CV:  Regular  rate and rhythm    Skin warm and well perfused   Resp:  Lungs are clear    Non-labored    No rales    No wheezing   GI:  Abdomen is soft, there is no rigidity    No distension    No rebound tenderness     No abdominal tenderness  MS:  Normal muscular tone    No leg swelling  Skin:  No rash or acute skin lesions noted  Neuro:   Awake, alert.      Not oriented    Speech is normal and fluent.    Face is symmetric.     Moves all extremities equally  Psych: Normal affect.  Appropriate interactions.    Emergency Department Course   ECG (11:04:47):  Rate 62 bpm. AZ interval 164. QRS duration 86. QT/QTc 422/428. P-R-T axes 73 57 48. Sinus rhythm with premature atrial complexes. Otherwise normal ECG. No significant change compared to EKG dated 06/06/18. Interpreted at 1107 by Cindy Preciado MD.    Laboratory:  CBC: HGB 12.0 (L) o/w WNL (WBC 5.3, )   BMP: Glucose 117 (H) o/w WNL (Creatinine 0.89)  Troponin: <0.015    Interventions:  1243: NS 1L IV Bolus    Emergency Department Course:  Past medical records, nursing notes, and vitals reviewed.  1112: I performed an exam of the patient and obtained history, as documented above.  EKG obtained, results above.  IV inserted and blood drawn.    Findings and plan explained to the patient. Patient discharged home with instructions regarding supportive care, medications, and reasons to return. The importance of close follow-up was reviewed.     Impression & Plan    Medical Decision Making:  Elie Marcano is a 77 year old male who presents with a history and clinical exam consistent with pre-syncope.  I considered a broad differential for their syncope today including cardiac arrythmia, ACS, aortic stenosis, HOCM, PE, orthostatic hypotension, drugs, situational, carotid hypersensitivity, seizure, TIA, stroke, vasovagal.   He has no signs of a concerning etiology for syncope at this point. Patient did have positive orthostatic blood pressure in ED per nursing. Given IV  fluids. The workup in the ED is negative and the physical exam is re-assurring. Patient is well appearing here in ED and denying any pain. Did have visit last week for similar symptoms. Given the positive orthostatics and two visits, will stop lisinopril right now and have facility check blood pressure daily for next several days and have him see his doctor at facility to see if lisinopril should continue to be held or can be restarted at potentially lower dose. Doubt ACS, PE. Lives in a facility who can monitor the patient. Supportive outpatient management is therefore indicated.      Diagnosis:    ICD-10-CM   1. Pre-syncope R55       Disposition:  discharged to home    Edmund Diaz  6/11/2018    EMERGENCY DEPARTMENT  IEdmund am serving as a scribe at 11:12 AM on 6/11/2018 to document services personally performed by Cindy Preciado MD based on my observations and the provider's statements to me.        Cindy Preciado MD  06/11/18 9169

## 2018-06-11 NOTE — ED AVS SNAPSHOT
Emergency Department    [address] [place] [address]    Phone:  [phone]    Fax:  [phone]                                       Elie Marcano   MRN: [id number]    Department:   Emergency Department   Date of Visit:  6/11/2018           After Visit Summary Signature Page     I have received my discharge instructions, and my questions have been answered. I have discussed any challenges I see with this plan with the nurse or doctor.    ..........................................................................................................................................  Patient/Patient Representative Signature      ..........................................................................................................................................  Patient Representative Print Name and Relationship to Patient    ..................................................               ................................................  Date                                            Time    ..........................................................................................................................................  Reviewed by Signature/Title    ...................................................              ..............................................  Date                                                            Time

## 2018-06-11 NOTE — DISCHARGE INSTRUCTIONS
Stop lisinopril  Have the blood pressures rechecked daily x3 days  Have him see his doctor this week to see if the lisinopril should be restarted at a lower dose

## 2018-06-11 NOTE — ED NOTES
Bed: ED23  Expected date:   Expected time:   Means of arrival:   Comments:  regino - 518 - 78M syncope eta 1050

## 2018-06-11 NOTE — ED AVS SNAPSHOT
Emergency Department    [address] [facility] [address]    Phone:  [phone]    Fax:  [phone]                                       Elie Marcano   MRN: [id number]    Department:   Emergency Department   Date of Visit:  6/11/2018           Patient Information     Date Of Birth          1940        Your diagnoses for this visit were:     Pre-syncope        You were seen by Cindy Preciado MD.      Follow-up Information     Follow up with Sharri Dennis MD.    Specialty:  Internal Medicine    Contact information:    [address] [address]  [phone]          Follow up with  Emergency Department.    Specialty:  EMERGENCY MEDICINE    Why:  If symptoms worsen    Contact information:    [address] [facility] [id number] [phone]        Discharge Instructions       Stop lisinopril  Have the blood pressures rechecked daily x3 days  Have him see his doctor this week to see if the lisinopril should be restarted at a lower dose     24 Hour Appointment Hotline       To make an appointment at any [facility] clinic, call [phone] ([phone]). If you don't have a family doctor or clinic, we will help you find one. [facility] clinics are conveniently located to serve the needs of you and your family.             Review of your medicines      Our records show that you are taking the medicines listed below. If these are incorrect, please call your family doctor or clinic.        Dose / Directions Last dose taken    ACETAMINOPHEN PO   Dose:  650 mg        Take 650 mg by mouth 4 times daily as needed for pain   Refills:  0        CEROVITE SENIOR Tabs   Dose:  1 tablet   Indication:  supplement   Quantity:  30 tablet        Take 1 tablet by mouth daily   Refills:  98        citalopram 10 MG tablet   Commonly known as:  celeXA   Dose:  5 mg   Quantity:  60 tablet        Take 0.5 tablets (5 mg) by mouth daily   Refills:  98         FLUTICASONE PROPIONATE (NASAL) NA   Dose:  1 spray        Spray 1 spray in nostril daily 1 spray daily. For Rhinitis   Refills:  0        latanoprost 0.005 % ophthalmic solution   Commonly known as:  XALATAN   Dose:  1 drop        Place 1 drop into both eyes At Bedtime   Refills:  0        Levothyroxine Sodium 112 MCG Caps   Dose:  112 mcg   Indication:  Underactive Thyroid   Quantity:  31 capsule        Take 112 mcg by mouth daily   Refills:  11        QUEtiapine 25 MG tablet   Commonly known as:  SEROQUEL   Dose:  25 mg   Quantity:  31 tablet        Take 1 tablet (25 mg) by mouth daily   Refills:  98        SARNA 0.5-0.5 % Lotn   Generic drug:  camphor-menthol        Apply topically 2 times daily as needed Until rash is cleared, then d/c.   Refills:  0        tamsulosin 0.4 MG capsule   Commonly known as:  FLOMAX   Dose:  0.4 mg   Quantity:  31 capsule        Take 1 capsule (0.4 mg) by mouth daily   Refills:  98        Vitamin D (Cholecalciferol) 1000 units Tabs   Dose:  2000 Units   Quantity:  62 tablet        Take 2,000 Units by mouth daily   Refills:  98          STOP taking        Dose Reason for stopping Comments    lisinopril 10 MG tablet   Commonly known as:  PRINIVIL/ZESTRIL                      Procedures and tests performed during your visit     Basic metabolic panel    CBC (+ platelets, no diff)    EKG 12 lead    I have reviewed and agree with all the recommendations and orders detailed in this document.    Troponin I (now)      Orders Needing Specimen Collection     None      Pending Results     No orders found from 6/9/2018 to 6/12/2018.            Pending Culture Results     No orders found from 6/9/2018 to 6/12/2018.            Pending Results Instructions     If you had any lab results that were not finalized at the time of your Discharge, you can call the ED Lab Result RN at [phone]. You will be contacted by this team for any positive Lab results or changes in treatment. The nurses are  available 7 days a week from 10A to 6:30P.  You can leave a message 24 hours per day and they will return your call.        Test Results From Your Hospital Stay        6/11/2018 12:14 PM      Component Results     Component Value Ref Range & Units Status    Sodium 138 133 - 144 mmol/L Final    Potassium 3.8 3.4 - 5.3 mmol/L Final    Chloride 103 94 - 109 mmol/L Final    Carbon Dioxide 28 20 - 32 mmol/L Final    Anion Gap 7 3 - 14 mmol/L Final    Glucose 117 (H) 70 - 99 mg/dL Final    Urea Nitrogen 13 7 - 30 mg/dL Final    Creatinine 0.89 0.66 - 1.25 mg/dL Final    GFR Estimate 83 >60 mL/min/1.7m2 Final    Non  GFR Calc    GFR Estimate If Black >90 >60 mL/min/1.7m2 Final    African American GFR Calc    Calcium 8.5 8.5 - 10.1 mg/dL Final         6/11/2018 11:55 AM      Component Results     Component Value Ref Range & Units Status    WBC 5.3 4.0 - 11.0 10e9/L Final    RBC Count 3.74 (L) 4.4 - 5.9 10e12/L Final    Hemoglobin 12.0 (L) 13.3 - 17.7 g/dL Final    Hematocrit 36.3 (L) 40.0 - 53.0 % Final    MCV 97 78 - 100 fl Final    MCH 32.1 26.5 - 33.0 pg Final    MCHC 33.1 31.5 - 36.5 g/dL Final    RDW 14.4 10.0 - 15.0 % Final    Platelet Count 160 150 - 450 10e9/L Final         6/11/2018 12:19 PM      Component Results     Component Value Ref Range & Units Status    Troponin I ES <0.015 0.000 - 0.045 ug/L Final    The 99th percentile for upper reference range is 0.045 ug/L.  Troponin values   in the range of 0.045 - 0.120 ug/L may be associated with risks of adverse   clinical events.                  Clinical Quality Measure: Blood Pressure Screening     Your blood pressure was checked while you were in the emergency department today. The last reading we obtained was  BP: 127/62 . Please read the guidelines below about what these numbers mean and what you should do about them.  If your systolic blood pressure (the top number) is less than 120 and your diastolic blood pressure (the bottom number) is less  "than 80, then your blood pressure is normal. There is nothing more that you need to do about it.  If your systolic blood pressure (the top number) is 120-139 or your diastolic blood pressure (the bottom number) is 80-89, your blood pressure may be higher than it should be. You should have your blood pressure rechecked within a year by a primary care provider.  If your systolic blood pressure (the top number) is 140 or greater or your diastolic blood pressure (the bottom number) is 90 or greater, you may have high blood pressure. High blood pressure is treatable, but if left untreated over time it can put you at risk for heart attack, stroke, or kidney failure. You should have your blood pressure rechecked by a primary care provider within the next 4 weeks.  If your provider in the emergency department today gave you specific instructions to follow-up with your doctor or provider even sooner than that, you should follow that instruction and not wait for up to 4 weeks for your follow-up visit.        Thank you for choosing [facility]       Thank you for choosing [facility] for your care. Our goal is always to provide you with excellent care. Hearing back from our patients is one way we can continue to improve our services. Please take a few minutes to complete the written survey that you may receive in the mail after you visit with us. Thank you!        Statement of Approval     Ordered          06/11/18 1325  I have reviewed and agree with all the recommendations and orders detailed in this document.  EFFECTIVE NOW     Approved and electronically signed by:  Cindy Preciado MD MyChart Information     [facility] lets you send messages to your doctor, view your test results, renew your prescriptions, schedule appointments and more. To sign up, go to [email or web address] . Click on \"Log in\" on the left side of the screen, which will take you to the Welcome page. Then click on \"Sign up Now\" on the right " side of the page.     You will be asked to enter the access code listed below, as well as some personal information. Please follow the directions to create your username and password.     Your access code is: [email or web address]  Expires: 2018  1:11 PM     Your access code will  in 90 days. If you need help or a new code, please call your [facility] clinic or [phone].        Care EveryWhere ID     This is your Care EveryWhere ID. This could be used by other organizations to access your [facility] medical records  [id number]        Equal Access to Services     [facility] : Alcira Lagunas, jesús jaramillo, freddy omlaley, joshua weiss . So [facility] [phone].    ATENCIÓN: Si habla español, tiene a vargas disposición servicios gratuitos de asistencia lingüística. Llame al [phone].    We comply with applicable federal civil rights laws and [place] laws. We do not discriminate on the basis of race, color, national origin, age, disability, sex, sexual orientation, or gender identity.            After Visit Summary       This is your record. Keep this with you and show to your community pharmacist(s) and doctor(s) at your next visit.

## 2018-06-11 NOTE — ED NOTES
Patient up to chair and given courtesy meal by ERT. Report called to nursing office at [facility]. They will have patient follow-up with PCP.

## 2018-06-12 ENCOUNTER — ASSISTED LIVING VISIT (OUTPATIENT)
Dept: GERIATRICS | Facility: CLINIC | Age: 78
End: 2018-06-12
Payer: COMMERCIAL

## 2018-06-12 VITALS
HEART RATE: 78 BPM | RESPIRATION RATE: 16 BRPM | OXYGEN SATURATION: 99 % | SYSTOLIC BLOOD PRESSURE: 142 MMHG | DIASTOLIC BLOOD PRESSURE: 76 MMHG

## 2018-06-12 DIAGNOSIS — Z71.89 ADVANCE CARE PLANNING: ICD-10-CM

## 2018-06-12 DIAGNOSIS — I10 BENIGN ESSENTIAL HYPERTENSION: ICD-10-CM

## 2018-06-12 DIAGNOSIS — G30.0 EARLY ONSET ALZHEIMER'S DISEASE WITH BEHAVIORAL DISTURBANCE (H): ICD-10-CM

## 2018-06-12 DIAGNOSIS — I95.9 HYPOTENSION, UNSPECIFIED HYPOTENSION TYPE: Primary | ICD-10-CM

## 2018-06-12 DIAGNOSIS — E89.0 POSTABLATIVE HYPOTHYROIDISM: ICD-10-CM

## 2018-06-12 DIAGNOSIS — F02.818 EARLY ONSET ALZHEIMER'S DISEASE WITH BEHAVIORAL DISTURBANCE (H): ICD-10-CM

## 2018-06-12 NOTE — PROGRESS NOTES
"[facility]    Chief Complaint   Patient presents with     RECHECK     ED FU       [facility] Medical Record Number:  [id number]    HPI:    Elie Marcano is a 77 year old  (1940), who is being seen today for an episodic care visit at [facility].      HPI information obtained from: facility chart records, facility staff, patient report, [facility] Epic chart review and family/first contact Shayla Mitchell  report.    [facility] ED 6/6/18 and 6/11/18    Today's concern is:  Hypotension, unspecified hypotension type  Benign essential hypertension  Maintained on lisinopril 10 mg daily started in July 2017.  To ER 6/6 and and 6/11 with pre-syncope.   First ER visit after resident \"pale and shakey\" in [employer], hypotensive and possible syncope on [facility] nursing assessment. In ER ECG showed sinus bradycardia. CXR WNL. Labs WNL.  Second ER visit due to pre-syncope, BP in ER 87/54 and positive orthostatic hypotension. ECG showed sinus rhythm with premature atrial complexes. Otherwise normal ECG. No significant change compared to EKG dated 06/06/18. Labs WNL. Given 1L fluid bolus. Lisinopril stopped and d/c'd back to [facility].  Of note, was on atenolol previously but stopped due to low heart rate and BP. Syncope listed on problem list from previous PCP in [place], but no specific notes regarding etiology. Health care agent, Shayla, recalls some fainting episodes while resident signing in [employer] choir around time he was diagnosed with dementia, thinks syncope may have been related to thyroid issues.   Today resident denies chest pain, palpitations, dizziness, headache.  Recent VS reviewed:  BP Readings from Last 3 Encounters:   06/12/18 142/76   06/11/18 123/60   06/06/18 139/72     05/17/18 144/81   04/26/18 140/89   12/28/17 127/60     Early onset Alzheimer's disease with behavioral disturbance  Resident of [facility] since July 2016 - moved from [place].   [facility] on admission 11/30 July 2016. " "  Recent issue difficulty w/ showering and cares. Was using ativan prior to bathing, initially helped with anxiety, but became combative and agitated. Does not like assistance with ADLs, often repeats \"nothing is wrong with me\" and will not allow for assistance.   More paranoia on visit the Dr. Dennis at the end of April, ativan stopped and started on Seroquel 25 mg PO daily at 7:30 am. Per Dr. Dennis's note Seroquel was supposed to be ordered PRN prior to showers, but has been added to MAR by nursing as a daily medication. Aricept was also d/c'd last month. Continues on low dose Celexa 5 mg daily. Eating well and weight is stable.     Postablative hypothyroidism  History of Graves Disease with a multinodular toxic goiter.   Previously treated with methimazole and I-131 therapy in May 2016.   While in [place], was followed by endocrinology, lost to follow-up s/p move to [place].  Previously on levothyroxine 125 mcg, but dose decreased to 110 mcg 06/08/17 due to low TSH (0.71). On 8/01/2017 Levothyroxine increased to 112 mcg daily due to slightly elevated TSH (5.06).   TSH   Date Value Ref Range Status   02/21/2018 1.32 0.40 - 4.00 mU/L Final   10/31/2017 3.67 0.40 - 4.00 mU/L Final   09/20/2017 1.13 0.40 - 4.00 mU/L Final   08/01/2017 5.06 (A) 0.40 - 4.00 mU/L Final   06/08/2017 0.71 0.40 - 4.00 mU/L Final     Advance care planning  Reviewed goals of care with Shayla, does not went aggressive care or speciality referral unless would add to comfort. Focus on comfort and quality of life given advanced dementia.   \"If it's his time to go, it's his time to go\".    ALLERGIES: Review of patient's allergies indicates no known allergies.     Past Medical, Surgical, Family and Social History reviewed and updated in [facility].    Current Outpatient Prescriptions   Medication Sig Dispense Refill     ACETAMINOPHEN PO Take 650 mg by mouth 4 times daily as needed for pain       camphor-menthol (SARNA) 0.5-0.5 % LOTN Apply topically 2 times daily as " needed Until rash is cleared, then d/c.        Citalopram Hydrobromide (CELEXA PO) Take 10 mg by mouth daily       FLUTICASONE PROPIONATE, NASAL, NA Spray 1 spray in nostril daily 1 spray daily. For Rhinitis       latanoprost (XALATAN) 0.005 % ophthalmic solution Place 1 drop into both eyes At Bedtime       Levothyroxine Sodium 112 MCG CAPS Take 112 mcg by mouth daily 31 capsule 11     Multiple Vitamins-Minerals (CEROVITE SENIOR) TABS Take 1 tablet by mouth daily 30 tablet 98     QUEtiapine Fumarate (SEROQUEL PO) Take 25 mg by mouth daily as needed On shower days.        tamsulosin (FLOMAX) 0.4 MG capsule Take 1 capsule (0.4 mg) by mouth daily 31 capsule 98     Vitamin D, Cholecalciferol, 1000 UNITS TABS Take 2,000 Units by mouth daily 62 tablet 98     Medications reviewed:  Medications reconciled to facility chart and changes were made to reflect current medications as identified as above med list. Below are the changes that were made:   Medications stopped since last EPIC medication reconciliation:   Medications Discontinued During This Encounter   Medication Reason     QUEtiapine (SEROQUEL) 25 MG tablet      Medications started since last [facility] medication reconciliation:  Orders Placed This Encounter   Medications     QUEtiapine Fumarate (SEROQUEL PO)     Sig: Take 25 mg by mouth daily On shower days.     REVIEW OF SYSTEMS:  Unobtainable secondary to cognitive impairment.     Physical Exam:  /76  Pulse 78  Resp 16  SpO2 99%   NP checked orthostatics  Standin/76, 78  Sittin/70, 63  GENERAL APPEARANCE:  Alert, in no distress, clean and well dressed  HEENT: EOM intact w/o nystagmus, MMM w/o exudate, conjunctiva w/o injection, no drainage.  RESP:  Respiratory effort and palpation of chest normal, lungs clear to auscultation , no respiratory distress  CV:  Palpation and auscultation of heart done, regular rate and rhythm, no murmur, rub, or gallop  PV: no edema, calves are supple and  non-tender  GI: abd is soft, non-tender, non-distended, + BS  M/S:   Gait and station normal, ambulating independently w/ 4WW, needs cueing to use walker. 5/5 biceps strength. 5/5 strength w/ seat hip flexion and knee extension   SKIN:  No visible rashes, lesions or ulcerations. Skin without increased warmth or tenderness.  Neuro: CN II-XII grossly intact, no tremor, normal tone  PSYCH:  memory impaired, oriented to self only, speech fluent, judgment impaired, cooperative today, no paranoia.     Recent Labs:  CBC RESULTS:   Recent Labs   Lab Test  06/11/18   1138  06/06/18   1110   WBC  5.3  4.5   RBC  3.74*  4.11*   HGB  12.0*  13.3   HCT  36.3*  39.7*   MCV  97  97   MCH  32.1  32.4   MCHC  33.1  33.5   RDW  14.4  14.4   PLT  160  202       Last Basic Metabolic Panel:  Recent Labs   Lab Test  06/11/18   1138  06/06/18   1110   NA  138  136   POTASSIUM  3.8  3.9   CHLORIDE  103  99   RONALD  8.5  8.9   CO2  28  28   BUN  13  20   CR  0.89  0.90   GLC  117*  114*       Liver Function Studies -   Recent Labs   Lab Test  06/06/18   1110 11/03/16   PROTTOTAL  7.0  6.3*   ALBUMIN  3.2*  3.0  3.0*   BILITOTAL  0.4  0.5  0.5   ALKPHOS  69  68  68   AST  19  19  19   ALT  19  29  29       TSH   Date Value Ref Range Status   02/21/2018 1.32 0.40 - 4.00 mU/L Final   10/31/2017 3.67 0.40 - 4.00 mU/L Final     Assessment/Plan:  (I95.9) Hypotension, unspecified hypotension type  (primary encounter diagnosis)  (I10) Benign essential hypertension  Comment: Two ER visits within five days for pre-syncope in setting of low BP and orthostasis. Feeling well today.  Plan:   - Agree w/ plan to d/c lisinopril and monitor  - D/c scheduled Seroquel, new medication that may contribute to orthostatic hypotension  - Weekly BP and HR on Thursday  -  nursing to follow of hypertension and behavior monitoring in light of medication changes    (G30.0,  F02.81) Early onset Alzheimer's disease with behavioral disturbance  Comment: slow  progressive cognitive and functional decline, recently with increased paranoia and agitation and irritability, refusing cares.  Plan:   - Stop Seroquel as above due to possible side effects, change to 12.5 mg PO 30-60 min prior to shower PRN on shower days for combative behaviors and agitation  - Increase Celexa to 10 mg PO daily to target irritability, agitation   - Check BMP in two weeks     (E89.0) Postablative hypothyroidism  Comment: History of Graves Disease with a multinodular toxic goiter.   Previously treated with methimazole and I-131 therapy in May 2016.   Last TSH WNL in Feb, managed on synthroid, lost to endocrine follow-up.  Plan:   - Continue levothyroxine 112 mcg daily   - Check TSH w/ next labs 6/28    (Z71.89) Advance care planning  Comment: Focus on comfort and quality of life per health care agent  Plan:   - Reviewed recent events, medication changes, and goal of care with health care agent, Shayla (Friend)    Electronically signed by  STAS Ziegler CNP          Documentation of Face-to-Face and Certification for Home Health Services     Patient: Elie Marcano   YOB: 1940  MR Number: [id number]  Today's Date: 6/12/2018    I certify that patient: Elie Marcano is under my care and that I, or a nurse practitioner or physician's assistant working with me, had a face-to-face encounter that meets the physician face-to-face encounter requirements with this patient on: 06/12/2018.    This encounter with the patient was in whole, or in part, for the following medical condition, which is the primary reason for home health care:   Hypotension, unspecified hypotension type  Benign essential hypertension  Early onset Alzheimer's disease with behavioral disturbance    I certify that, based on my findings, the following services are medically necessary home health services: Nursing.    My clinical findings support the need for the above services because: Nurse is needed: To assess BP  after changes in medications or other medical regimen..    Further, I certify that my clinical findings support that this patient is homebound (i.e. absences from home require considerable and taxing effort and are for medical reasons or [employer] services or infrequently or of short duration when for other reasons) because: Mental health symptoms including: Patient gets lost or wanders to due to cognitive impairments...    Based on the above findings. I certify that this patient is confined to the home and needs intermittent skilled nursing care, physical therapy and/or speech therapy.  The patient is under my care, and I have initiated the establishment of the plan of care.  This patient will be followed by a physician who will periodically review the plan of care.  Physician/Provider to provide follow up care: Debbie Plata CNP    Responsible Medicare certified [place] Physician: Dr. Sharri Dennis MD  Physician Signature: See electronic signature associated with these discharge orders.  Date: 6/12/2018

## 2018-06-12 NOTE — LETTER
"    6/12/2018        RE: Elie Marcano  [facility]  [address]  [facility] [address]        [facility]    Chief Complaint   Patient presents with     RECHECK     ED FU       [facility] Medical Record Number:  [id number]    HPI:    Elie Marcano is a 77 year old  (1940), who is being seen today for an episodic care visit at [facility].      HPI information obtained from: facility chart records, facility staff, patient report, [facility] Epic chart review and family/first contact Shayla Paula  report.    [facility] ED 6/6/18 and 6/11/18    Today's concern is:  Hypotension, unspecified hypotension type  Benign essential hypertension  Maintained on lisinopril 10 mg daily started in July 2017.  To ER 6/6 and and 6/11 with pre-syncope.   First ER visit after resident \"pale and shakey\" in [employer], hypotensive and possible syncope on [facility] nursing assessment. In ER ECG showed sinus bradycardia. CXR WNL. Labs WNL.  Second ER visit due to pre-syncope, BP in ER 87/54 and positive orthostatic hypotension. ECG showed sinus rhythm with premature atrial complexes. Otherwise normal ECG. No significant change compared to EKG dated 06/06/18. Labs WNL. Given 1L fluid bolus. Lisinopril stopped and d/c'd back to [facility].  Of note, was on atenolol previously but stopped due to low heart rate and BP. Syncope listed on problem list from previous PCP in [place], but no specific notes regarding etiology. Health care agent, Shayla, recalls some fainting episodes while resident signing in [employer] choir around time he was diagnosed with dementia, thinks syncope may have been related to thyroid issues.   Today resident denies chest pain, palpitations, dizziness, headache.  Recent VS reviewed:  BP Readings from Last 3 Encounters:   06/12/18 142/76   06/11/18 123/60   06/06/18 139/72     05/17/18 144/81   04/26/18 140/89   12/28/17 127/60     Early onset Alzheimer's disease with behavioral " "disturbance  Resident [facility] since [facility] - moved from [place].   [facility] on admission 11/30 July 2016.   Recent issue difficulty w/ showering and cares. Was using ativan prior to bathing, initially helped with anxiety, but became combative and agitated. Does not like assistance with ADLs, often repeats \"nothing is wrong with me\" and will not allow for assistance.   More paranoia on visit the Dr. Dennis at the end of April, ativan stopped and started on Seroquel 25 mg PO daily at 7:30 am. Per Dr. Dennis's note Seroquel was supposed to be ordered PRN prior to showers, but has been added to MAR by nursing as a daily medication. Aricept was also d/c'd last month. Continues on low dose Celexa 5 mg daily. Eating well and weight is stable.     Postablative hypothyroidism  History of Graves Disease with a multinodular toxic goiter.   Previously treated with methimazole and I-131 therapy in May 2016.   While in [place], was followed by endocrinology, lost to follow-up s/p move to [place].  Previously on levothyroxine 125 mcg, but dose decreased to 110 mcg 06/08/17 due to low TSH (0.71). On 8/01/2017 Levothyroxine increased to 112 mcg daily due to slightly elevated TSH (5.06).   TSH   Date Value Ref Range Status   02/21/2018 1.32 0.40 - 4.00 mU/L Final   10/31/2017 3.67 0.40 - 4.00 mU/L Final   09/20/2017 1.13 0.40 - 4.00 mU/L Final   08/01/2017 5.06 (A) 0.40 - 4.00 mU/L Final   06/08/2017 0.71 0.40 - 4.00 mU/L Final     Advance care planning  Reviewed goals of care with Shayla, does not went aggressive care or speciality referral unless would add to comfort. Focus on comfort and quality of life given advanced dementia.   \"If it's his time to go, it's his time to go\".    ALLERGIES: Review of patient's allergies indicates no known allergies.     Past Medical, Surgical, Family and Social History reviewed and updated in [facility].    Current Outpatient Prescriptions   Medication Sig Dispense Refill     ACETAMINOPHEN PO Take 650 mg by " mouth 4 times daily as needed for pain       camphor-menthol (SARNA) 0.5-0.5 % LOTN Apply topically 2 times daily as needed Until rash is cleared, then d/c.        Citalopram Hydrobromide (CELEXA PO) Take 10 mg by mouth daily       FLUTICASONE PROPIONATE, NASAL, NA Spray 1 spray in nostril daily 1 spray daily. For Rhinitis       latanoprost (XALATAN) 0.005 % ophthalmic solution Place 1 drop into both eyes At Bedtime       Levothyroxine Sodium 112 MCG CAPS Take 112 mcg by mouth daily 31 capsule 11     Multiple Vitamins-Minerals (CEROVITE SENIOR) TABS Take 1 tablet by mouth daily 30 tablet 98     QUEtiapine Fumarate (SEROQUEL PO) Take 25 mg by mouth daily as needed On shower days.        tamsulosin (FLOMAX) 0.4 MG capsule Take 1 capsule (0.4 mg) by mouth daily 31 capsule 98     Vitamin D, Cholecalciferol, 1000 UNITS TABS Take 2,000 Units by mouth daily 62 tablet 98     Medications reviewed:  Medications reconciled to facility chart and changes were made to reflect current medications as identified as above med list. Below are the changes that were made:   Medications stopped since last EPIC medication reconciliation:   Medications Discontinued During This Encounter   Medication Reason     QUEtiapine (SEROQUEL) 25 MG tablet      Medications started since last [facility] medication reconciliation:  Orders Placed This Encounter   Medications     QUEtiapine Fumarate (SEROQUEL PO)     Sig: Take 25 mg by mouth daily On shower days.     REVIEW OF SYSTEMS:  Unobtainable secondary to cognitive impairment.     Physical Exam:  /76  Pulse 78  Resp 16  SpO2 99%   NP checked orthostatics  Standin/76, 78  Sittin/70, 63  GENERAL APPEARANCE:  Alert, in no distress, clean and well dressed  HEENT: EOM intact w/o nystagmus, MMM w/o exudate, conjunctiva w/o injection, no drainage.  RESP:  Respiratory effort and palpation of chest normal, lungs clear to auscultation , no respiratory distress  CV:  Palpation and auscultation  of heart done, regular rate and rhythm, no murmur, rub, or gallop  PV: no edema, calves are supple and non-tender  GI: abd is soft, non-tender, non-distended, + BS  M/S:   Gait and station normal, ambulating independently w/ 4WW, needs cueing to use walker. 5/5 biceps strength. 5/5 strength w/ seat hip flexion and knee extension   SKIN:  No visible rashes, lesions or ulcerations. Skin without increased warmth or tenderness.  Neuro: CN II-XII grossly intact, no tremor, normal tone  PSYCH:  memory impaired, oriented to self only, speech fluent, judgment impaired, cooperative today, no paranoia.     Recent Labs:  CBC RESULTS:   Recent Labs   Lab Test  06/11/18   1138  06/06/18   1110   WBC  5.3  4.5   RBC  3.74*  4.11*   HGB  12.0*  13.3   HCT  36.3*  39.7*   MCV  97  97   MCH  32.1  32.4   MCHC  33.1  33.5   RDW  14.4  14.4   PLT  160  202       Last Basic Metabolic Panel:  Recent Labs   Lab Test  06/11/18   1138  06/06/18   1110   NA  138  136   POTASSIUM  3.8  3.9   CHLORIDE  103  99   RONALD  8.5  8.9   CO2  28  28   BUN  13  20   CR  0.89  0.90   GLC  117*  114*       Liver Function Studies -   Recent Labs   Lab Test  06/06/18   1110 11/03/16   PROTTOTAL  7.0  6.3*   ALBUMIN  3.2*  3.0  3.0*   BILITOTAL  0.4  0.5  0.5   ALKPHOS  69  68  68   AST  19  19  19   ALT  19  29  29       TSH   Date Value Ref Range Status   02/21/2018 1.32 0.40 - 4.00 mU/L Final   10/31/2017 3.67 0.40 - 4.00 mU/L Final     Assessment/Plan:  (I95.9) Hypotension, unspecified hypotension type  (primary encounter diagnosis)  (I10) Benign essential hypertension  Comment: Two ER visits within five days for pre-syncope in setting of low BP and orthostasis. Feeling well today.  Plan:   - Agree w/ plan to d/c lisinopril and monitor  - D/c scheduled Seroquel, new medication that may contribute to orthostatic hypotension  - Weekly BP and HR on Thursday  -  nursing to follow of hypertension and behavior monitoring in light of medication  changes    (G30.0,  F02.81) Early onset Alzheimer's disease with behavioral disturbance  Comment: slow progressive cognitive and functional decline, recently with increased paranoia and agitation and irritability, refusing cares.  Plan:   - Stop Seroquel as above due to possible side effects, change to 12.5 mg PO 30-60 min prior to shower PRN on shower days for combative behaviors and agitation  - Increase Celexa to 10 mg PO daily to target irritability, agitation   - Check BMP in two weeks     (E89.0) Postablative hypothyroidism  Comment: History of Graves Disease with a multinodular toxic goiter.   Previously treated with methimazole and I-131 therapy in May 2016.   Last TSH WNL in Feb, managed on synthroid, lost to endocrine follow-up.  Plan:   - Continue levothyroxine 112 mcg daily   - Check TSH w/ next labs 6/28    (Z71.89) Advance care planning  Comment: Focus on comfort and quality of life per health care agent  Plan:   - Reviewed recent events, medication changes, and goal of care with health care agent, Shayla (Friend)    Electronically signed by  STAS Ziegler CNP          Documentation of Face-to-Face and Certification for Home Health Services     Patient: Elie Marcano   YOB: 1940  MR Number: [id number]  Today's Date: 6/12/2018    I certify that patient: Elie Marcano is under my care and that I, or a nurse practitioner or physician's assistant working with me, had a face-to-face encounter that meets the physician face-to-face encounter requirements with this patient on: 06/12/2018.    This encounter with the patient was in whole, or in part, for the following medical condition, which is the primary reason for home health care:   Hypotension, unspecified hypotension type  Benign essential hypertension  Early onset Alzheimer's disease with behavioral disturbance    I certify that, based on my findings, the following services are medically necessary home health services:  Nursing.    My clinical findings support the need for the above services because: Nurse is needed: To assess BP after changes in medications or other medical regimen..    Further, I certify that my clinical findings support that this patient is homebound (i.e. absences from home require considerable and taxing effort and are for medical reasons or [employer] orth[employer] services or infrequently or of short duration when for other reasons) because: Mental health symptoms including: Patient gets lost or wanders to due to cognitive impairments...    Based on the above findings. I certify that this patient is confined to the home and needs intermittent skilled nursing care, physical therapy and/or speech therapy.  The patient is under my care, and I have initiated the establishment of the plan of care.  This patient will be followed by a physician who will periodically review the plan of care.  Physician/Provider to provide follow up care: Debbie Plata CNP    Responsible Medicare certified [facility] Physician: Dr. Sharri Dennis MD  Physician Signature: See electronic signature associated with these discharge orders.  Date: 6/12/2018                  Sincerely,        STAS Ziegler CNP

## 2018-06-28 ENCOUNTER — TRANSFERRED RECORDS (OUTPATIENT)
Dept: HEALTH INFORMATION MANAGEMENT | Facility: CLINIC | Age: 78
End: 2018-06-28

## 2018-06-28 LAB
ANION GAP SERPL CALCULATED.3IONS-SCNC: 7 MMOL/L (ref 3–14)
BUN SERPL-MCNC: 15 MG/DL (ref 7–30)
CALCIUM SERPL-MCNC: 8.3 MG/DL (ref 8.5–10.1)
CHLORIDE SERPLBLD-SCNC: 102 MMOL/L (ref 94–109)
CO2 SERPL-SCNC: 27 MMOL/L (ref 20–32)
CREAT SERPL-MCNC: 0.79 MG/DL (ref 0.66–1.25)
GFR SERPL CREATININE-BSD FRML MDRD: >90 ML/MIN/1.73M2
GLUCOSE SERPL-MCNC: 91 MG/DL (ref 70–99)
POTASSIUM SERPL-SCNC: 3.6 MMOL/L (ref 3.4–5.3)
SODIUM SERPL-SCNC: 136 MMOL/L (ref 133–144)
TSH SERPL-ACNC: 1.05 MU/L (ref 0.4–4)

## 2018-06-29 ENCOUNTER — PATIENT OUTREACH (OUTPATIENT)
Dept: GERIATRIC MEDICINE | Facility: CLINIC | Age: 78
End: 2018-06-29

## 2018-06-29 DIAGNOSIS — F32.89 OTHER DEPRESSION: Primary | ICD-10-CM

## 2018-06-29 RX ORDER — CITALOPRAM HYDROBROMIDE 10 MG/1
10 TABLET ORAL DAILY
Qty: 60 TABLET | Refills: 98 | Status: SHIPPED | OUTPATIENT
Start: 2018-06-29 | End: 2019-01-01

## 2018-06-29 NOTE — PROGRESS NOTES
[facility] member opened to case management due to two recent ED visits. CM has requested the service agreement twice from [facility]  through email and voice mail but still have not received. Reviewed notes from NP. UFS POC and HRA initiated. CM to continue to follow through EPIC notes for any changes.   Saloni Tineo RN  [facility]   [phone]

## 2018-07-20 RX ORDER — LATANOPROST 50 UG/ML
SOLUTION/ DROPS OPHTHALMIC
Qty: 2.5 ML | Refills: 97 | Status: SHIPPED | OUTPATIENT
Start: 2018-07-20

## 2018-08-02 ENCOUNTER — ASSISTED LIVING VISIT (OUTPATIENT)
Dept: GERIATRICS | Facility: CLINIC | Age: 78
End: 2018-08-02
Payer: COMMERCIAL

## 2018-08-02 VITALS
OXYGEN SATURATION: 96 % | HEART RATE: 62 BPM | TEMPERATURE: 98.5 F | DIASTOLIC BLOOD PRESSURE: 75 MMHG | WEIGHT: 164.8 LBS | RESPIRATION RATE: 22 BRPM | SYSTOLIC BLOOD PRESSURE: 134 MMHG

## 2018-08-02 DIAGNOSIS — G30.0 EARLY ONSET ALZHEIMER'S DISEASE WITH BEHAVIORAL DISTURBANCE (H): Primary | ICD-10-CM

## 2018-08-02 DIAGNOSIS — N40.0 BENIGN PROSTATIC HYPERPLASIA WITHOUT LOWER URINARY TRACT SYMPTOMS: ICD-10-CM

## 2018-08-02 DIAGNOSIS — L30.9 DERMATITIS: Primary | ICD-10-CM

## 2018-08-02 DIAGNOSIS — E89.0 POSTABLATIVE HYPOTHYROIDISM: ICD-10-CM

## 2018-08-02 DIAGNOSIS — F02.818 EARLY ONSET ALZHEIMER'S DISEASE WITH BEHAVIORAL DISTURBANCE (H): Primary | ICD-10-CM

## 2018-08-02 DIAGNOSIS — E55.9 VITAMIN D DEFICIENCY: ICD-10-CM

## 2018-08-02 DIAGNOSIS — I10 BENIGN ESSENTIAL HYPERTENSION: ICD-10-CM

## 2018-08-02 NOTE — PROGRESS NOTES
"[facility]  Chief Complaint   Patient presents with     Annual Comprehensive Exam Assisted Living       [place] Medical Record Number:  [id number]    HPI:    Elie Marcano is a 77 year old  (1940), who is being seen today for an annual comprehensive visit at [facility].      HPI information obtained from: facility chart records, facility staff, patient report and [place] [facility] Epic chart review.      Today's concerns are:  Early onset Alzheimer's disease with behavioral disturbance  Resident of [facility] since July 2016 - moved from [place].   SLUMS on admission 11/30 July 2016. SLUMS 7/30 in July 2017.   Recent issue difficulty w/ showering and cares. Was using ativan prior to bathing, initially helped with anxiety, but became combative and agitated. Does not like assistance with ADLs, often repeats \"nothing is wrong with me\" and will not allow for assistance.   Strikes out at staff on occasion. OT was following for staff training with good effect, but unclear if staff are still adhering to recommended approach.  More paranoia on visit the Dr. Dennis at the end of April, ativan stopped and started on Seroquel 25 mg PO daily at 7:30 am, but had two falls in June in setting of low blood pressure and Seroquel was stopped and changed to PRN prior to bathing, unclear if resident is actually using Seroquel prior to showers. Aricept was also d/c'd. Continues on Celexa 10 mg daily - dose increased in June 2018. Eating well and weight is stable.     Benign essential hypertension  Maintained on lisinopril 10 mg daily started in July 2017. Lisinopril stopped June 2018 after two ER visits with pre-syncope. No arrhythmia on ECG.  Syncope listed on problem list from previous PCP in [place], but no specific notes regarding etiology.   Today resident denies chest pain, palpitations, dizziness, headache.  Recent VS reviewed:  BP Readings from Last 3 Encounters:   08/02/18 134/75   06/12/18 " 142/76   06/11/18 123/60     Postablative hypothyroidism  History of Graves Disease with a multinodular toxic goiter.   Previously treated with methimazole and I-131 therapy in May 2016.   While in [place], was followed by endocrinology, lost to follow-up s/p move to [place].  Previously on levothyroxine 125 mcg, but dose decreased to 110 mcg 06/08/17 due to low TSH (0.71). On 8/01/2017 Levothyroxine increased to 112 mcg daily due to slightly elevated TSH (5.06).   TSH   Date Value Ref Range Status   02/21/2018 1.32 0.40 - 4.00 mU/L Final   10/31/2017 3.67 0.40 - 4.00 mU/L Final   09/20/2017 1.13 0.40 - 4.00 mU/L Final   08/01/2017 5.06 (A) 0.40 - 4.00 mU/L Final   06/08/2017 0.71 0.40 - 4.00 mU/L Final     BPH  No urinary symptoms reported by resident and no change in urinary habits reported by staff.  Continue on Flomax 0.4 mg daily.     Vitamin D Deficiency   No recent vitamin D level.  Ambulatory.  Managed with vitamin D 2,000 units daily.     ALLERGIES: Review of patient's allergies indicates no known allergies.    PROBLEM LIST:  Patient Active Problem List   Diagnosis     Health Care Home     Benign essential hypertension     Alzheimer's dementia with behavioral disturbance     Vitamin D deficiency     Advance care planning     Postablative hypothyroidism     Rash and nonspecific skin eruption     PAST MEDICAL HISTORY:  has a past medical history of Alzheimer disease; BPH (benign prostatic hyperplasia); Chronic sinusitis; Cognitive impairment; Elevated PSA; Hyperlipidemia; Hypertension; Syncope; Thyroid disease; Vitamin D deficiency; and Weight loss.     PAST SURGICAL HISTORY:  has a past surgical history that includes appendectomy.     FAMILY HISTORY: Family history is unknown by patient.     SOCIAL HISTORY:  reports that he has never smoked. He does not have any smokeless tobacco history on file. He reports that he does not drink alcohol or use illicit drugs.     IMMUNIZATIONS:  Most Recent Immunizations    Administered Date(s) Administered     Influenza (High Dose) 3 valent vaccine 09/28/2017     Influenza (IIV3) PF 10/18/2012     Influenza Intranasal Vaccine 09/20/2013     Pneumo Conj 13-V (2010&after) 09/24/2015     Pneumococcal 23 valent 12/09/2013     Tdap (Adacel,Boostrix) 01/01/2013     Yellow Fever 11/04/2003     Zoster vaccine, live 12/10/2013     Above immunizations pulled from [facility]. [facility] and facility records also reconciled. Outstanding information sent to  to update [facility].  Future immunizations needed:  yearly influenza per facility protocol  MEDICATIONS:  Current Outpatient Prescriptions   Medication Sig Dispense Refill     ACETAMINOPHEN PO Take 650 mg by mouth 4 times daily as needed for pain       camphor-menthol (SARNA) 0.5-0.5 % LOTN Apply topically 2 times daily as needed Until rash is cleared, then d/c.        citalopram (CELEXA) 10 MG tablet Take 1 tablet (10 mg) by mouth daily 60 tablet 98     FLUTICASONE PROPIONATE, NASAL, NA Spray 1 spray in nostril daily 1 spray daily. For Rhinitis       latanoprost (XALATAN) 0.005 % ophthalmic solution INSTILL ONE DROP IN EACH EYE EVERY NIGHT AT BEDTIME 2.5 mL 97     Levothyroxine Sodium 112 MCG CAPS Take 112 mcg by mouth daily 31 capsule 11     Multiple Vitamins-Minerals (CEROVITE SENIOR) TABS Take 1 tablet by mouth daily 30 tablet 98     QUEtiapine Fumarate (SEROQUEL PO) Take 25 mg by mouth daily as needed On shower days.        tamsulosin (FLOMAX) 0.4 MG capsule Take 1 capsule (0.4 mg) by mouth daily 31 capsule 98     Vitamin D, Cholecalciferol, 1000 UNITS TABS Take 2,000 Units by mouth daily 62 tablet 98     Medications reviewed:  Medications reconciled to facility chart and changes were made to reflect current medications as identified as above med list. Below are the changes that were made:   Medications stopped since last EPIC medication reconciliation:   There are no discontinued medications.    Medications started  "since last [facility] medication reconciliation:  No orders of the defined types were placed in this encounter.    Case Management:  I have reviewed the Assisted Living care plan, current immunizations and preventive care/cancer screening..Future cancer screening is not clinically indicated secondary to age/goals of care Patient's desire to return to the community is not assessible due to cognitive impairment. Current Level of Care is appropriate.    Advance Directive Discussion:    I reviewed the current advanced directives as reflected in EPIC, the POLST and the facility chart, and verified the congruency of orders. I contacted the first party Shayla Mitchell and left a message regarding the plan of Care.  I did not due to cognitive impairment review the advance directives with the resident.     Team Discussion:  I communicated with the appropriate disciplines involved with the Plan of Care:   Nursing   and OT      Patient Goal:  Patient's goal is unobtainable secondary to cognitive impairment and family's/responsible party's goal for the patient is has been focus on comfort and QoL.  Pre previous note/dicussion w/ POA:  \"Reviewed goals of care with Shayla, does not went aggressive care or speciality referral unless would add to comfort. Focus on comfort and quality of life given advanced dementia.   'If it's his time to go, it's his time to go'.\"    Information reviewed:  Medications, vital signs, orders, and nursing notes.    ROS:  Unobtainable secondary to cognitive impairment.     Exam:  /75  Pulse 62  Temp 98.5  F (36.9  C)  Resp 22  Wt 164 lb 12.8 oz (74.8 kg)  SpO2 96%  GENERAL APPEARANCE:  Alert, in no distress, clean and well dressed, sitting in activity room with his walker   HEENT: MMM w/o exudate, conjunctiva w/o injection, no drainage.  RESP:  Respiratory effort and palpation of chest normal, lungs clear to auscultation , no respiratory distress  CV:  Palpation and auscultation of heart done, regular rate " "and rhythm, no murmur, rub, or gallop  PV: no edema, calves are supple and non-tender  GI: abd flat and soft, non-tender, non-distended, + BS  M/S:   Gait and station normal, ambulating independently w/ 4WW, needs cueing to use walker. 5/5 biceps strength. 5/5 strength w/ seat hip flexion and knee extension   SKIN:  No visible rashes, lesions or ulcerations. Skin without increased warmth or tenderness.  Neuro: CN II-XII grossly intact, no tremor, normal tone  PSYCH:  memory impaired, oriented to self only, speech fluent, judgment impaired, cooperative today, confused \"why are you doing this?\" but easily reassured and redirected.     Lab/Diagnostic data:   CBC RESULTS:   Recent Labs   Lab Test  06/11/18   1138  06/06/18   1110   WBC  5.3  4.5   RBC  3.74*  4.11*   HGB  12.0*  13.3   HCT  36.3*  39.7*   MCV  97  97   MCH  32.1  32.4   MCHC  33.1  33.5   RDW  14.4  14.4   PLT  160  202       Last Basic Metabolic Panel:  Recent Labs   Lab Test 06/28/18 06/11/18   1138   NA  136  138   POTASSIUM  3.6  3.8   CHLORIDE  102  103   RONALD  8.3*  8.5   CO2  27  28   BUN  15  13   CR  0.79  0.89   GLC  91  117*       Liver Function Studies -   Recent Labs   Lab Test  06/06/18   1110 11/03/16   PROTTOTAL  7.0  6.3*   ALBUMIN  3.2*  3.0  3.0*   BILITOTAL  0.4  0.5  0.5   ALKPHOS  69  68  68   AST  19  19  19   ALT  19  29  29       TSH   Date Value Ref Range Status   06/28/2018 1.05 0.40 - 4.00 mU/L Final   02/21/2018 1.32 0.40 - 4.00 mU/L Final     ASSESSMENT/PLAN  (G30.0,  F02.81) Early onset Alzheimer's disease with behavioral disturbance  (primary encounter diagnosis)  Comment: Slow progressive cognitive and functional decline, intermitent paranoia and irritability, refusing cares at times  Plan:   - Continue Seroquel 12.5 mg PO 30-60 min prior to shower for combative behaviors and agitation - follow up with facility to make sure aides are giving PRN  - Continue Celexa to 10 mg PO daily to target irritability, agitation "   - Message sent to OT regarding staff difficulty with showers again, may benefit from another consult to train staff again on approach     (I10) Benign essential hypertension  Comment:   BP goals are <150/90 mm Hg.This is higher than ACC and AHA recommendations due to goals of care, risk for hypotension and risk of dizziness and falls. Patient is stable and continue without pharmacological invention with routine assessment.  Plan:   - Monitor routine labs and vital signs     (E89.0) Postablative hypothyroidism  Comment: History of Graves Disease with a multinodular toxic goiter.   Previously treated with methimazole and I-131 therapy in May 2016.   Last TSH 1.05 in June 2018, managed on synthroid, lost to endocrine follow-up.  Plan:   - Continue levothyroxine 112 mcg daily   - Check TSH June 2019  - HC agent does not want to bring out to specialist unless would add to comfort    (N40.0) Benign prostatic hyperplasia without lower urinary tract symptoms  Comment: No change in urinary habits, no report of symptoms  Plan:   - Continue Flomax     (E55.9) Vitamin D deficiency  Comment: Maintained on supplement due to hx of vit D def  Plan:   - Continue vitamin D supplement    Message left for Shayla Mitchell requesting return call to discuss clinical status and plan of care. Recently discussed care goals w/ Shayla after ER visits in June.    Electronically signed by:  STAS Ziegler CNP

## 2018-08-02 NOTE — LETTER
"    8/2/2018        RE: Elie Marcano  [facility]  [address]  [facility] [address]        [place]  Chief Complaint   Patient presents with     Annual Comprehensive Exam Assisted Living       [place] Medical Record Number:  [id number]    HPI:    Elie Marcano is a 77 year old  (1940), who is being seen today for an annual comprehensive visit at [facility].      HPI information obtained from: facility chart records, facility staff, patient report and [facility] chart review.      Today's concerns are:  Early onset Alzheimer's disease with behavioral disturbance  Resident of [facility] since July 2016 - moved from [place].   SLUMS on admission 11/30 July 2016. SLUMS 7/30 in July 2017.   Recent issue difficulty w/ showering and cares. Was using ativan prior to bathing, initially helped with anxiety, but became combative and agitated. Does not like assistance with ADLs, often repeats \"nothing is wrong with me\" and will not allow for assistance.   Strikes out at staff on occasion. OT was following for staff training with good effect, but unclear if staff are still adhering to recommended approach.  More paranoia on visit the Dr. Dennis at the end of April, ativan stopped and started on Seroquel 25 mg PO daily at 7:30 am, but had two falls in June in setting of low blood pressure and Seroquel was stopped and changed to PRN prior to bathing, unclear if resident is actually using Seroquel prior to showers. Aricept was also d/c'd. Continues on Celexa 10 mg daily - dose increased in June 2018. Eating well and weight is stable.     Benign essential hypertension  Maintained on lisinopril 10 mg daily started in July 2017. Lisinopril stopped June 2018 after two ER visits with pre-syncope. No arrhythmia on ECG.  Syncope listed on problem list from previous PCP in [place], but no specific notes regarding etiology.   Today resident denies chest pain, " palpitations, dizziness, headache.  Recent VS reviewed:  BP Readings from Last 3 Encounters:   08/02/18 134/75   06/12/18 142/76   06/11/18 123/60     Postablative hypothyroidism  History of Graves Disease with a multinodular toxic goiter.   Previously treated with methimazole and I-131 therapy in May 2016.   While in [place], was followed by endocrinology, lost to follow-up s/p move to [place].  Previously on levothyroxine 125 mcg, but dose decreased to 110 mcg 06/08/17 due to low TSH (0.71). On 8/01/2017 Levothyroxine increased to 112 mcg daily due to slightly elevated TSH (5.06).   TSH   Date Value Ref Range Status   02/21/2018 1.32 0.40 - 4.00 mU/L Final   10/31/2017 3.67 0.40 - 4.00 mU/L Final   09/20/2017 1.13 0.40 - 4.00 mU/L Final   08/01/2017 5.06 (A) 0.40 - 4.00 mU/L Final   06/08/2017 0.71 0.40 - 4.00 mU/L Final     BPH  No urinary symptoms reported by resident and no change in urinary habits reported by staff.  Continue on Flomax 0.4 mg daily.     Vitamin D Deficiency   No recent vitamin D level.  Ambulatory.  Managed with vitamin D 2,000 units daily.     ALLERGIES: Review of patient's allergies indicates no known allergies.    PROBLEM LIST:  Patient Active Problem List   Diagnosis     Health Care Home     Benign essential hypertension     Alzheimer's dementia with behavioral disturbance     Vitamin D deficiency     Advance care planning     Postablative hypothyroidism     Rash and nonspecific skin eruption     PAST MEDICAL HISTORY:  has a past medical history of Alzheimer disease; BPH (benign prostatic hyperplasia); Chronic sinusitis; Cognitive impairment; Elevated PSA; Hyperlipidemia; Hypertension; Syncope; Thyroid disease; Vitamin D deficiency; and Weight loss.     PAST SURGICAL HISTORY:  has a past surgical history that includes appendectomy.     FAMILY HISTORY: Family history is unknown by patient.     SOCIAL HISTORY:  reports that he has never smoked. He does not have any smokeless tobacco history on file.  He reports that he does not drink alcohol or use illicit drugs.     IMMUNIZATIONS:  Most Recent Immunizations   Administered Date(s) Administered     Influenza (High Dose) 3 valent vaccine 09/28/2017     Influenza (IIV3) PF 10/18/2012     Influenza Intranasal Vaccine 09/20/2013     Pneumo Conj 13-V (2010&after) 09/24/2015     Pneumococcal 23 valent 12/09/2013     Tdap (Adacel,Boostrix) 01/01/2013     Yellow Fever 11/04/2003     Zoster vaccine, live 12/10/2013     Above immunizations pulled from [facility]. [facility] and facility records also reconciled. Outstanding information sent to  to update [facility].  Future immunizations needed:  yearly influenza per facility protocol  MEDICATIONS:  Current Outpatient Prescriptions   Medication Sig Dispense Refill     ACETAMINOPHEN PO Take 650 mg by mouth 4 times daily as needed for pain       camphor-menthol (SARNA) 0.5-0.5 % LOTN Apply topically 2 times daily as needed Until rash is cleared, then d/c.        citalopram (CELEXA) 10 MG tablet Take 1 tablet (10 mg) by mouth daily 60 tablet 98     FLUTICASONE PROPIONATE, NASAL, NA Spray 1 spray in nostril daily 1 spray daily. For Rhinitis       latanoprost (XALATAN) 0.005 % ophthalmic solution INSTILL ONE DROP IN EACH EYE EVERY NIGHT AT BEDTIME 2.5 mL 97     Levothyroxine Sodium 112 MCG CAPS Take 112 mcg by mouth daily 31 capsule 11     Multiple Vitamins-Minerals (CEROVITE SENIOR) TABS Take 1 tablet by mouth daily 30 tablet 98     QUEtiapine Fumarate (SEROQUEL PO) Take 25 mg by mouth daily as needed On shower days.        tamsulosin (FLOMAX) 0.4 MG capsule Take 1 capsule (0.4 mg) by mouth daily 31 capsule 98     Vitamin D, Cholecalciferol, 1000 UNITS TABS Take 2,000 Units by mouth daily 62 tablet 98     Medications reviewed:  Medications reconciled to facility chart and changes were made to reflect current medications as identified as above med list. Below are the changes that were made:   Medications stopped  "since last [facility] medication reconciliation:   There are no discontinued medications.    Medications started since last [facility] medication reconciliation:  No orders of the defined types were placed in this encounter.    Case Management:  I have reviewed the Assisted Living care plan, current immunizations and preventive care/cancer screening..Future cancer screening is not clinically indicated secondary to age/goals of care Patient's desire to return to the community is not assessible due to cognitive impairment. Current Level of Care is appropriate.    Advance Directive Discussion:    I reviewed the current advanced directives as reflected in EPIC, the POLST and the facility chart, and verified the congruency of orders. I contacted the first party Shayla Mitchell and left a message regarding the plan of Care.  I did not due to cognitive impairment review the advance directives with the resident.     Team Discussion:  I communicated with the appropriate disciplines involved with the Plan of Care:   Nursing   and OT      Patient Goal:  Patient's goal is unobtainable secondary to cognitive impairment and family's/responsible party's goal for the patient is has been focus on comfort and QoL.  Pre previous note/dicussion w/ POA:  \"Reviewed goals of care with Shayla, does not went aggressive care or speciality referral unless would add to comfort. Focus on comfort and quality of life given advanced dementia.   'If it's his time to go, it's his time to go'.\"    Information reviewed:  Medications, vital signs, orders, and nursing notes.    ROS:  Unobtainable secondary to cognitive impairment.     Exam:  /75  Pulse 62  Temp 98.5  F (36.9  C)  Resp 22  Wt 164 lb 12.8 oz (74.8 kg)  SpO2 96%  GENERAL APPEARANCE:  Alert, in no distress, clean and well dressed, sitting in activity room with his walker   HEENT: MMM w/o exudate, conjunctiva w/o injection, no drainage.  RESP:  Respiratory effort and palpation of chest normal, lungs " "clear to auscultation , no respiratory distress  CV:  Palpation and auscultation of heart done, regular rate and rhythm, no murmur, rub, or gallop  PV: no edema, calves are supple and non-tender  GI: abd flat and soft, non-tender, non-distended, + BS  M/S:   Gait and station normal, ambulating independently w/ 4WW, needs cueing to use walker. 5/5 biceps strength. 5/5 strength w/ seat hip flexion and knee extension   SKIN:  No visible rashes, lesions or ulcerations. Skin without increased warmth or tenderness.  Neuro: CN II-XII grossly intact, no tremor, normal tone  PSYCH:  memory impaired, oriented to self only, speech fluent, judgment impaired, cooperative today, confused \"why are you doing this?\" but easily reassured and redirected.     Lab/Diagnostic data:   CBC RESULTS:   Recent Labs   Lab Test  06/11/18   1138  06/06/18   1110   WBC  5.3  4.5   RBC  3.74*  4.11*   HGB  12.0*  13.3   HCT  36.3*  39.7*   MCV  97  97   MCH  32.1  32.4   MCHC  33.1  33.5   RDW  14.4  14.4   PLT  160  202       Last Basic Metabolic Panel:  Recent Labs   Lab Test 06/28/18 06/11/18   1138   NA  136  138   POTASSIUM  3.6  3.8   CHLORIDE  102  103   RONALD  8.3*  8.5   CO2  27  28   BUN  15  13   CR  0.79  0.89   GLC  91  117*       Liver Function Studies -   Recent Labs   Lab Test  06/06/18   1110 11/03/16   PROTTOTAL  7.0  6.3*   ALBUMIN  3.2*  3.0  3.0*   BILITOTAL  0.4  0.5  0.5   ALKPHOS  69  68  68   AST  19  19  19   ALT  19  29  29       TSH   Date Value Ref Range Status   06/28/2018 1.05 0.40 - 4.00 mU/L Final   02/21/2018 1.32 0.40 - 4.00 mU/L Final     ASSESSMENT/PLAN  (G30.0,  F02.81) Early onset Alzheimer's disease with behavioral disturbance  (primary encounter diagnosis)  Comment: Slow progressive cognitive and functional decline, intermitent paranoia and irritability, refusing cares at times  Plan:   - Continue Seroquel 12.5 mg PO 30-60 min prior to shower for combative behaviors and agitation - follow up with " facility to make sure aides are giving PRN  - Continue Celexa to 10 mg PO daily to target irritability, agitation   - Message sent to OT regarding staff difficulty with showers again, may benefit from another consult to train staff again on approach     (I10) Benign essential hypertension  Comment:   BP goals are <150/90 mm Hg.This is higher than ACC and AHA recommendations due to goals of care, risk for hypotension and risk of dizziness and falls. Patient is stable and continue without pharmacological invention with routine assessment.  Plan:   - Monitor routine labs and vital signs     (E89.0) Postablative hypothyroidism  Comment: History of Graves Disease with a multinodular toxic goiter.   Previously treated with methimazole and I-131 therapy in May 2016.   Last TSH 1.05 in June 2018, managed on synthroid, lost to endocrine follow-up.  Plan:   - Continue levothyroxine 112 mcg daily   - Check TSH June 2019  - HC agent does not want to bring out to specialist unless would add to comfort    (N40.0) Benign prostatic hyperplasia without lower urinary tract symptoms  Comment: No change in urinary habits, no report of symptoms  Plan:   - Continue Flomax     (E55.9) Vitamin D deficiency  Comment: Maintained on supplement due to hx of vit D def  Plan:   - Continue vitamin D supplement    Message left for Shayla Mitchell requesting return call to discuss clinical status and plan of care. Recently discussed care goals w/ Shayla after ER visits in June.    Electronically signed by:  STAS Ziegler CNP          Sincerely,        STAS Ziegler CNP

## 2018-08-24 DIAGNOSIS — E03.8 OTHER SPECIFIED HYPOTHYROIDISM: ICD-10-CM

## 2018-08-26 RX ORDER — LEVOTHYROXINE SODIUM 112 UG/1
TABLET ORAL
Qty: 31 TABLET | Refills: 98 | Status: SHIPPED | OUTPATIENT
Start: 2018-08-26 | End: 2018-01-01

## 2018-10-18 NOTE — LETTER
"    10/18/2018        RE: Elie Marcano  [facility]  [address]  [facility] [address]        Elie Marcano is a 78 year old male seen October 18, 2018 at [facility] unit where he has resided for 2 years (admit 7/2016) seen to follow up worsening dementia, now with resistance to cares.   Patient is seen on the unit up to chair.   He reports \"Everything's fine\"   Says no to pain or other symptoms, wants to limit questions.   Nursing staff reports ongoing resistance to cares.   He was seen by [facility] who made a list of suggestions for approaching patient to do showers or personal cares, and those have been helpful.   He also utilizes a prn quetiapine dose before cares, sometimes.      Pt had 2 falls this past summer, with low BPs.   At that time lisinopril and scheduled quetiapine were d/c'd and he has not had further falls or syncope.   BPs stable.     Patient has a h/o Graves' disease with toxic multinodular goiter, treated with methimazole and I-131 tx 5/2016 in [place].   Patient reports he sleeps okay.  No dizziness.  Has lost some weight.         Past Medical History   Diagnosis Date     Hypertension      Cognitive impairment      Vitamin D deficiency      Alzheimer disease      Hyperlipidemia      BPH (benign prostatic hyperplasia)      Elevated PSA      Syncope      Thyroid disease      Graves disease, Multinodular goiter  S/p I-131 ablation, 2016     Weight loss      Chronic sinusitis        SH:  Single, no children.   His friend Shayla Mitchell is his primary contact  Patient reports he was raised [place] in [place], has one brother.   Worked as a , and for [employer].     ROS: limited but negative other than above.   He is ambulatory with 4WW.    Fall risk.   SLUMS 7/30  Wt Readings from Last 5 Encounters:   08/02/18 164 lb 12.8 oz (74.8 kg)   06/11/18 170 lb (77.1 kg)   05/17/18 169 lb (76.7 kg)   04/26/18 166 lb 9.6 oz (75.6 kg)   12/28/17 " 171 lb 4.8 oz (77.7 kg)        EXAM:  Pleasant, NAD, unkempt  /71  Pulse 84  Temp 98.8  F (37.1  C)  SpO2 98%   Neck supple without adenopathy  Lungs decreased BS, few scattered crackles   Heart RRR s1s2 @80 without ectopy, 1/6 APRIL  Abd soft, NT, no distention, +BS  Ext without edema  Neuro: ambulatory with 4WW, short steps and stooped gait  Psych: affect anxious     Labs reviewed.     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     (I10) Benign essential hypertension    Comment:   BP Readings from Last 3 Encounters:   08/02/18 134/75   06/12/18 142/76   06/11/18 123/60      Plan: no longer on anti-hypertensives    (G30.0,  F02.81) Early onset Alzheimer's disease with behavioral disturbance  Comment:  with suspiciousness and resistance to cares  Plan:   Continue citalopram   Re-approach, prn quetiapine for safe cares  AL Memory Care support for assist with meds, meals, activity and safety.     (N40.0) Benign prostatic hyperplasia without lower urinary tract symptoms, unspecified morphology  Comment: no current sx  Plan: continue Flomax to avoid urinary retention.       (E55.9) Vitamin D deficiency  Comment:  on replacement  Plan: vit D3 2000 units/day        Sharri Dennis MD              Sincerely,        Sharri Dennis MD

## 2018-10-18 NOTE — PROGRESS NOTES
"Elie Marcano is a 78 year old male seen October 18, 2018 at [facility] unit where he has resided for 2 years (admit 7/2016) seen to follow up worsening dementia, now with resistance to cares.   Patient is seen on the unit up to chair.   He reports \"Everything's fine\"   Says no to pain or other symptoms, wants to limit questions.   Nursing staff reports ongoing resistance to cares.   He was seen by [facility] who made a list of suggestions for approaching patient to do showers or personal cares, and those have been helpful.   He also utilizes a prn quetiapine dose before cares, sometimes.      Pt had 2 falls this past summer, with low BPs.   At that time lisinopril and scheduled quetiapine were d/c'd and he has not had further falls or syncope.   BPs stable.     Patient has a h/o Graves' disease with toxic multinodular goiter, treated with methimazole and I-131 tx 5/2016 in [place].   Patient reports he sleeps okay.  No dizziness.  Has lost some weight.         Past Medical History   Diagnosis Date     Hypertension      Cognitive impairment      Vitamin D deficiency      Alzheimer disease      Hyperlipidemia      BPH (benign prostatic hyperplasia)      Elevated PSA      Syncope      Thyroid disease      Graves disease, Multinodular goiter  S/p I-131 ablation, 2016     Weight loss      Chronic sinusitis        SH:  Single, no children.   His friend Shayla Mitchell is his primary contact  Patient reports he was raised on a farm in [place], has one brother.   Worked as a , and for [employer].     ROS: limited but negative other than above.   He is ambulatory with 4WW.    Fall risk.   SLUMS 7/30  Wt Readings from Last 5 Encounters:   08/02/18 164 lb 12.8 oz (74.8 kg)   06/11/18 170 lb (77.1 kg)   05/17/18 169 lb (76.7 kg)   04/26/18 166 lb 9.6 oz (75.6 kg)   12/28/17 171 lb 4.8 oz (77.7 kg)        EXAM:  Pleasant, NAD, unkempt  /71  Pulse 84  Temp 98.8  F (37.1  C)  SpO2 " 98%   Neck supple without adenopathy  Lungs decreased BS, few scattered crackles   Heart RRR s1s2 @80 without ectopy, 1/6 APRIL  Abd soft, NT, no distention, +BS  Ext without edema  Neuro: ambulatory with 4WW, short steps and stooped gait  Psych: affect anxious     Labs reviewed.     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     (I10) Benign essential hypertension    Comment:   BP Readings from Last 3 Encounters:   08/02/18 134/75   06/12/18 142/76   06/11/18 123/60      Plan: no longer on anti-hypertensives    (G30.0,  F02.81) Early onset Alzheimer's disease with behavioral disturbance  Comment:  with suspiciousness and resistance to cares  Plan:   Continue citalopram   Re-approach, prn quetiapine for safe cares  AL Memory Care support for assist with meds, meals, activity and safety.     (N40.0) Benign prostatic hyperplasia without lower urinary tract symptoms, unspecified morphology  Comment: no current sx  Plan: continue Flomax to avoid urinary retention.       (E55.9) Vitamin D deficiency  Comment:  on replacement  Plan: vit D3 2000 units/day        Sharri Dennis MD

## 2018-11-27 NOTE — LETTER
"    11/27/2018        RE: Elie Marcano  [facility]  [address]  [facility] [address]        [facility]    Chief Complaint   Patient presents with     RECHECK       [place] Medical Record Number:  [id number]  [facility][id number][facility]    HPI:    Elie Marcano is a 78 year old  (1940), who is being seen today for an episodic care visit.      HPI information obtained from: facility chart records, facility staff, patient report and [facility] chart review.    Today's concern is:  Loss of Weight  Weight is down 18% over six month period.  Recently will not stay seated at table for long, will get up after taking a few bites.  Staff attributed change in appetite to hospitalization of tablemate, who has since returned from hospital.  No change in bowel or bladder habits. No nausea or vomiting. No apparent abd pain.  No sweats. No melena or hematochezia reported by staff.   Due to dementia, resident cannot offer meaningful history, repeats \"nothing is wrong with me.\"    Recent weights reviewed.  11/26   141#  11/19  153#  Wt Readings from Last 2 Encounters:   11/27/18 141 lb 12.8 oz (64.3 kg)   08/02/18 164 lb 12.8 oz (74.8 kg)     Early onset Alzheimer's disease with behavioral disturbance  Resident of [facility] since July 2016 - moved from [place].   SLUMS on admission 11/30 July 2016. SLUMS 7/30 in July 2017.   Difficulty allowing for assistance w/ showering and ADLs. Was using ativan prior to bathing, initially helped with anxiety, but became more combative and agitated.  Today repeats the same - \"nothing is wrong me!\"   Paranoid affect today.  Strikes out at staff on occasion.   OT was following for staff training with good effect, but unclear if staff are still adhering to recommended approach.  More paranoia on visit the Dr. Dennis at the end of April, ativan stopped and started on Seroquel 25 " mg PO daily at 7:30 am, but had two falls in June in setting of low blood pressure and Seroquel was stopped and changed to PRN prior to bathing, unclear if resident is actually using Seroquel prior to showers. Aricept was also d/c'd.   Continues on Celexa 10 mg daily - dose increased in June 2018.     Benign essential hypertension  Lisinopril stopped June 2018 after two ER visits with pre-syncope. No arrhythmia on ECG.  Syncope listed on problem list from previous PCP in [place], but no specific notes regarding etiology.  Today resident denies chest pain, palpitations, dizziness, headache.  Recent VS reviewed:  BP Readings from Last 3 Encounters:   11/27/18 132/77   10/28/18 140/71   08/02/18 134/75     Postablative hypothyroidism  History of Graves Disease with a multinodular toxic goiter.   Previously treated with methimazole and I-131 therapy in May 2016.   While in [place], was followed by endocrinology, lost to follow-up s/p move to [place].  Previously on levothyroxine 125 mcg, but dose decreased to 110 mcg 06/08/17 due to low TSH (0.71).   On 8/01/2017 Levothyroxine increased to 112 mcg daily due to slightly elevated TSH (5.06).   TSH   Date Value Ref Range Status   02/21/2018 1.32 0.40 - 4.00 mU/L Final   10/31/2017 3.67 0.40 - 4.00 mU/L Final   09/20/2017 1.13 0.40 - 4.00 mU/L Final   08/01/2017 5.06 (A) 0.40 - 4.00 mU/L Final   06/08/2017 0.71 0.40 - 4.00 mU/L Final     BPH  No urinary symptoms reported by resident and no change in urinary habits reported by staff.   Continue on Flomax 0.4 mg daily.     Vitamin D Deficiency   No recent vitamin D level.  Ambulatory.  Managed with vitamin D 2,000 units daily.     Advance Care Planning  DNR/DNI.  Health care agent has wanted to focus on comfort and quality of life given advanced dementia.     ALLERGIES: Review of patient's allergies indicates no known allergies.     Past Medical, Surgical, Family and Social History reviewed and updated in [facility].    Current Outpatient  Prescriptions   Medication Sig Dispense Refill     camphor-menthol (SARNA) 0.5-0.5 % LOTN Apply 1 mL topically 2 times daily as needed 222 mL 98     citalopram (CELEXA) 10 MG tablet Take 1 tablet (10 mg) by mouth daily 60 tablet 98     FLUTICASONE PROPIONATE, NASAL, NA Spray 1 spray in nostril daily 1 spray daily. For Rhinitis       latanoprost (XALATAN) 0.005 % ophthalmic solution INSTILL ONE DROP IN EACH EYE EVERY NIGHT AT BEDTIME 2.5 mL 97     MAPAP 325 MG tablet TAKE TWO TABLETS (650MG) BY MOUTH FOUR TIMES A DAY AS NEEDED FOR PAIN 180 tablet 98     MIRTAZAPINE PO Take 7.5 mg by mouth At Bedtime       Multiple Vitamins-Minerals (CEROVITE SENIOR) TABS Take 1 tablet by mouth daily 30 tablet 98     QUEtiapine Fumarate (SEROQUEL PO) Take 25 mg by mouth daily as needed On shower days.        tamsulosin (FLOMAX) 0.4 MG capsule TAKE 1 CAPSULE BY MOUTH ONCE DAILY 31 capsule 98     Vitamin D, Cholecalciferol, 1000 UNITS TABS Take 2,000 Units by mouth daily 62 tablet 98     LEVOTHYROXINE SODIUM PO Take 100 mcg by mouth       Medications reviewed:  Medications reconciled to facility chart and changes were made to reflect current medications as identified as above med list. Below are the changes that were made:   Medications stopped since last EPIC medication reconciliation:   There are no discontinued medications.    Medications started since last [facility] medication reconciliation:  Orders Placed This Encounter   Medications     MIRTAZAPINE PO     Sig: Take 7.5 mg by mouth At Bedtime       REVIEW OF SYSTEMS:  Unobtainable secondary to cognitive impairment.     Physical Exam:  /77  Pulse 75  Temp 98.8  F (37.1  C)  Resp 20  Wt 141 lb 12.8 oz (64.3 kg)  SpO2 98%  GENERAL APPEARANCE:  Alert, slightly agitated, fully dressed, wearing polo shirt backwards, pacing around unit.  HEENT: MMM w/o exudate, conjunctiva w/o injection, no drainage.  RESP:  Respiratory effort and palpation of chest normal, lungs clear to  "auscultation , no respiratory distress  CV:  Palpation and auscultation of heart done, regular rate and rhythm, no murmur, rub, or gallop  PV: no edema, calves are supple and non-tender  GI: scaphoid abdomen, soft, non-tender, non-distended, + BS  M/S:   Gait and station shuffling, ambulating independently w/ 4WW then without walker, attempting to enter other residents rooms.   SKIN:  No visible rashes, lesions or ulcerations. Skin without increased warmth or tenderness.  Neuro: Does not follow for cranial nerve testing, no facial asymmetry, no tremor, normal tone  PSYCH:  memory impaired, oriented to self only, speech fluent, judgment impaired, cooperative today, confused \"why are you doing this?\" not easily reassured and agitated, will not sit for exam.     Recent Labs:  CBC RESULTS:   Recent Labs   Lab Test  06/11/18   1138  06/06/18   1110   WBC  5.3  4.5   RBC  3.74*  4.11*   HGB  12.0*  13.3   HCT  36.3*  39.7*   MCV  97  97   MCH  32.1  32.4   MCHC  33.1  33.5   RDW  14.4  14.4   PLT  160  202       Last Basic Metabolic Panel:  Recent Labs   Lab Test 06/28/18 06/11/18   1138   NA  136  138   POTASSIUM  3.6  3.8   CHLORIDE  102  103   RONALD  8.3*  8.5   CO2  27  28   BUN  15  13   CR  0.79  0.89   GLC  91  117*       Liver Function Studies -   Recent Labs   Lab Test  06/06/18   1110 11/03/16   PROTTOTAL  7.0  6.3*   ALBUMIN  3.2*  3.0  3.0*   BILITOTAL  0.4  0.5  0.5   ALKPHOS  69  68  68   AST  19  19  19   ALT  19  29  29       TSH   Date Value Ref Range Status   11/28/2018 0.47 0.40 - 4.00 mU/L Final   06/28/2018 1.05 0.40 - 4.00 mU/L Final     Assessment/Plan:  (R63.4) Loss of weight  (primary encounter diagnosis)  Comment: Weight is down about 30# over last six months. Eating less.   Plan:   - Check labs to rule out reversible cause: TSH, free T4, CBC, CMP, vitamin D, B12, A1C  - Reweigh patient to ensure most recent weight accurate  - Request family bring Ensure and favorite foods  - Start " Mitrazapine 7.5 mg PO once daily at HS, may help with agitation and stimulate appetite     (G30.0,  F02.81) Early onset Alzheimer's disease with behavioral disturbance  Comment: Progressive cognitive and functional decline over last year.  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   Previously treated with methimazole and I-131 therapy in May 2016.   Last TSH 1.05 in June 2018, managed on synthroid, lost to endocrine follow-up.  Plan:   - Continue levothyroxine 112 mcg daily -- if dose too high could contribute to weight loss and agitation   - Check TSH and free T4  - HC agent does not want to bring out to specialist unless would add to comfort    (N40.0) Benign prostatic hyperplasia, unspecified whether lower urinary tract symptoms present  Comment: No change in urinary habits, no report of symptoms  Plan:   - Continue Flomax     (E55.9) Vitamin D deficiency  Comment: Maintained on supplement due to hx of vit D def  Plan:   - Continue vitamin D supplement  - Check vitamin D level    (Z71.89) Advance care planning  Comment: Progressive dementia, DNR/DNI, now with weight loss  Plan:   - Called Shayla Parish ([facility]) no answer message left with my direct cell phone number requesting return call to discuss plan moving forward and medication changes.     Addendum 11/28/18:  End 8:50 - 9:03 am (13 minutes)  Shayla called back, reviewed clinical status and plan of care.   Notes that Betito has always loved food. She has notice a decline in  "cognition over the last several months, \"agitation is increasing and dementia increasing.\" Agreeable to trial of Mirtazapine in addition to Celexa. Will bring Ensure next week.  \"If he qualifies for hopsice then let's do it\" - Betito was clear on wishes he would not want life prolonged if poor quality.     Electronically signed by  STAS Ziegler CNP                      Sincerely,        STAS Ziegler CNP    "

## 2018-11-28 NOTE — PROGRESS NOTES
TSH   Date Value Ref Range Status   11/28/2018 0.47 0.40 - 4.00 mU/L Final   06/28/2018 1.05 0.40 - 4.00 mU/L Final   02/21/2018 1.32 0.40 - 4.00 mU/L Final   10/31/2017 3.67 0.40 - 4.00 mU/L Final   09/20/2017 1.13 0.40 - 4.00 mU/L Final     ORDERS:  1. d/c Levothyroxine due to low TSH and weight loss  2. Levothyroxine 100 mcg PO once daily, dx hypothyroidism   3. TSH and free T4 on January 10th, 2019, dx E03.9

## 2018-12-11 NOTE — PROGRESS NOTES
"[facility]    Chief Complaint   Patient presents with     JORDAN       [facility] Medical Record Number:  [id number]  [facility] [[id number][facility]    HPI:    Elie Marcano is a 78 year old  (1940), who is being seen today for an episodic care visit.      HPI information obtained from: facility chart records, facility staff, patient report and [facility] chart review.    Today's concern is:  Loss of Weight  \"He is doing so much better!\"   Weight is trending up.  Recently not eating much, unable to concentrate long enough to stay at table.   Staff were also concerned about mood after hospitalization of his friend and tablemate, who has since returned from hospital.  Started on Mirtazapine at  on 11/27/18.  Levothyroxine was also adjusted.  No change in bowel or bladder habits. No nausea or vomiting. No apparent abd pain.  No sweats. No melena or hematochezia reported by staff.   Due to dementia, resident cannot offer meaningful history.  Health care agent was going to bring Ensure, staff unclear if she brought it in yet.    Recent weights reviewed.  12/11  150#  11/26   141#  11/19  153#  Wt Readings from Last 2 Encounters:   11/27/18 141 lb 12.8 oz (64.3 kg)   08/02/18 164 lb 12.8 oz (74.8 kg)     Early onset Alzheimer's disease with behavioral disturbance  Resident of [facility] since July 2016 - moved from [place].   SLUMS on admission 11/30 July 2016. SLUMS 7/30 in July 2017.   Difficulty allowing for assistance w/ showering and ADLs.   Paranoid affect today.  Strikes out at staff on occasion.   Gets Seroquel prior to showers.   Staff were able to shower resident today, was agitated, but only verbal, one staff member able to assist with dressing.  Continues on Celexa 10 mg daily - dose increased in June 2018.   Mirtazapine started two weeks ago due to low mood, agitation, and low appetite.  Mood and appetite have " improved. Calm/cooperative today, less paranoid, makes jokes, follows simple direction.     Benign essential hypertension  Lisinopril stopped June 2018 after two ER visits with pre-syncope. No arrhythmia on ECG.  Syncope listed on problem list from previous PCP in [place], but no specific notes regarding etiology.  Today resident denies chest pain, palpitations, dizziness, headache.  Recent VS reviewed:  BP Readings from Last 3 Encounters:   12/11/18 152/80   11/27/18 132/77   10/28/18 140/71        Postablative hypothyroidism  History of Graves Disease with a multinodular toxic goiter.   Previously treated with methimazole and I-131 therapy in May 2016.   While in [place], was followed by endocrinology, lost to follow-up s/p move to [place].  Previously on levothyroxine 125 mcg, but dose decreased to 110 mcg 06/08/17 due to low TSH (0.71).   On 8/01/2017 Levothyroxine increased to 112 mcg daily due to slightly elevated TSH (5.06).   TSH 11/28 low normal, below goal of 3-5. Decreased levothyroxine dose 100 mcg PO once daily. Repeat TSH and free T4 on January 10 th.    TSH   Date Value Ref Range Status   11/28/2018 0.47 0.40 - 4.00 mU/L Final   06/28/2018 1.05 0.40 - 4.00 mU/L Final   02/21/2018 1.32 0.40 - 4.00 mU/L Final   10/31/2017 3.67 0.40 - 4.00 mU/L Final   09/20/2017 1.13 0.40 - 4.00 mU/L Final     Advance Care Planning  DNR/DNI.  Health care agent has wanted to focus on comfort and quality of life given advanced dementia.   Would like to consider hospice given progression of dementia over last year and low QoL.     ALLERGIES: Patient has no known allergies.     Past Medical, Surgical, Family and Social History reviewed and updated in [employer].    Current Outpatient Medications   Medication Sig Dispense Refill     camphor-menthol (SARNA) 0.5-0.5 % LOTN Apply 1 mL topically 2 times daily as needed 222 mL 98     citalopram (CELEXA) 10 MG tablet Take 1 tablet (10 mg) by mouth daily 60 tablet 98     FLUTICASONE PROPIONATE,  NASAL, NA Spray 1 spray in nostril daily 1 spray daily. For Rhinitis       latanoprost (XALATAN) 0.005 % ophthalmic solution INSTILL ONE DROP IN EACH EYE EVERY NIGHT AT BEDTIME 2.5 mL 97     levothyroxine (SYNTHROID/LEVOTHROID) 100 MCG tablet TAKE 1 TABLET BY MOUTH ONCE DAILY 31 tablet 98     MAPAP 325 MG tablet TAKE TWO TABLETS (650MG) BY MOUTH FOUR TIMES A DAY AS NEEDED FOR PAIN 180 tablet 98     mirtazapine (REMERON) 7.5 MG tablet TAKE 1 TABLET BY MOUTH ONCE DAILY 31 tablet 98     Multiple Vitamins-Minerals (CEROVITE SENIOR) TABS TAKE 1 TABLET BY MOUTH ONCE DAILY 31 tablet 98     QUEtiapine Fumarate (SEROQUEL PO) Take 25 mg by mouth daily as needed On shower days.        tamsulosin (FLOMAX) 0.4 MG capsule TAKE 1 CAPSULE BY MOUTH ONCE DAILY 31 capsule 98     VITAMIN D3 1000 units tablet TAKE TWO TABLETS (2000 UNITS) BY MOUTH ONCE DAILY 62 tablet 98     Medications reviewed:  Medications reconciled to facility chart and changes were made to reflect current medications as identified as above med list. Below are the changes that were made:   Medications stopped since last EPIC medication reconciliation:   There are no discontinued medications.    Medications started since last [facility] medication reconciliation:  No orders of the defined types were placed in this encounter.    REVIEW OF SYSTEMS:  Unobtainable secondary to cognitive impairment.   As per usual, say he feels fine and nothing is wrong.     Physical Exam:  /80   Pulse 64   Temp 98.4  F (36.9  C)   Resp 18   Wt 68 kg (150 lb)   SpO2 99%   GENERAL APPEARANCE:  Alert, fully dressed, well groomed, sitting in activity room with group of residents.  HEENT: MMM w/o exudate, conjunctiva w/o injection, no drainage.  RESP:  Respiratory effort and palpation of chest normal, lungs clear to auscultation , no respiratory distress  CV:  Palpation and auscultation of heart done, regular rate and rhythm, no murmur, rub, or gallop  PV: no edema, calves are supple  and non-tender  GI: scaphoid abdomen, soft, non-tender, non-distended, + BS  M/S:   Gait and station shuffling, ambulating independently w/ 4WW, needs step by step cueing to use walker  SKIN:  No visible rashes, lesions or ulcerations. Skin without increased warmth or tenderness.  Neuro: Does not follow for cranial nerve testing, no facial asymmetry, no tremor, normal tone  PSYCH:  memory impaired, oriented to self only, speech fluent, judgment impaired, cooperative today.    Recent Labs:   CBC RESULTS:   Recent Labs   Lab Test 11/28/18 06/11/18  1138   WBC 5.7 5.3   RBC 4.09* 3.74*   HGB 13.3 12.0*   HCT 38.8* 36.3*   MCV 95 97   MCH 32.5 32.1   MCHC 34.3 33.1   RDW 13.8 14.4    160       Last Basic Metabolic Panel:  Recent Labs   Lab Test 11/28/18 06/28/18    136   POTASSIUM 4.0 3.6   CHLORIDE 101 102   RONALD 8.9 8.3*   CO2 28 27   BUN 12 15   CR 0.82 0.79   GLC 96 91       Liver Function Studies -   Recent Labs   Lab Test 11/28/18 06/06/18  1110   PROTTOTAL 6.9 7.0   ALBUMIN 3.4 3.2*   BILITOTAL 0.6 0.4   ALKPHOS 62 69   AST 17 19   ALT 17 19     TSH   Date Value Ref Range Status   11/28/2018 0.47 0.40 - 4.00 mU/L Final   06/28/2018 1.05 0.40 - 4.00 mU/L Final     Lab Results   Component Value Date    A1C 5.8 11/28/2018     Assessment/Plan:  (R63.4) Loss of weight  (primary encounter diagnosis)  Comment: Eating more, weight is trending up after addition of Mirtazapine and dose reduction of levothyroxine.  Plan:   - Monitor weights  - Shayla to bring Ensure and favorite foods  - Continue Mitrazapine 7.5 mg PO once daily at HS, may help with agitation and stimulate appetite     (G30.0,  F02.81) Early onset Alzheimer's disease with behavioral disturbance  Comment: Progressive cognitive and functional decline over last year.  Plan:   - Weight is trending up, will follow-up on 01/10/19, if weight loss and decline were to continue will offer hospice  - Continues to benefit from supportive care in [facility]   - Continue Celexa and Mirtazapine, as well as Seroquel prior to showers. If agitation persists despite medication changes, consider scheduled Seroquel.  - Continue non-pharmacological intervention with agitation as per OT recommendations    (I10) Benign essential hypertension  Comment: BP slightly higher than 150 today off antihypertensives, no recent syncope, would not add medication at this time, repeat BP in one month at next follow-up.  Plan:   - Monitor routine VS    (E89.0) Postablative hypothyroidism  Comment: History of Graves Disease with a multinodular toxic goiter.   Previously treated with methimazole and I-131 therapy in May 2016.   Last TSH 0.47 on 11/28, levothyroxine dose lowered, lost to endocrine follow-up.  Plan:   - Continue levothyroxine 100 mcg daily - dose reduction 11/28  - Check TSH and free T4 on Jan 10th  - HC agent does not want to bring out to specialist unless would add to comfort    (Z71.89) Advance care planning  Comment: Progressive dementia, DNR/DNI, with recent weight loss, does not want TF, plan of care is comfort focus.   Plan:   - Called Shayla Parish (Friend/[facility]) no answer message left with my direct cell phone number requesting return call to discuss plan of care.    Electronically signed by  STAS Ziegler CNP

## 2018-12-11 NOTE — LETTER
"    12/11/2018        RE: Elie Marcano  [facility]  [address]  [facility] [address]        [facility]    Chief Complaint   Patient presents with     RECHECK       [facility] Medical Record Number:  [id number]  Place of Service where encounter took place:  [facility] [[id number]]    HPI:    Elie Marcano is a 78 year old  (1940), who is being seen today for an episodic care visit.      HPI information obtained from: facility chart records, facility staff, patient report and [facility] chart review.    Today's concern is:  Loss of Weight  \"He is doing so much better!\"   Weight is trending up.  Recently not eating much, unable to concentrate long enough to stay at table.   Staff were also concerned about mood after hospitalization of his friend and tablemate, who has since returned from hospital.  Started on Mirtazapine at  on 11/27/18.  Levothyroxine was also adjusted.  No change in bowel or bladder habits. No nausea or vomiting. No apparent abd pain.  No sweats. No melena or hematochezia reported by staff.   Due to dementia, resident cannot offer meaningful history.  Health care agent was going to bring Ensure, staff unclear if she brought it in yet.    Recent weights reviewed.  12/11  150#  11/26   141#  11/19  153#  Wt Readings from Last 2 Encounters:   11/27/18 141 lb 12.8 oz (64.3 kg)   08/02/18 164 lb 12.8 oz (74.8 kg)     Early onset Alzheimer's disease with behavioral disturbance  Resident of [facility] since July 2016 - moved from [place].   SLUMS on admission 11/30 July 2016. [facility] 7/30 in July 2017.   Difficulty allowing for assistance w/ showering and ADLs.   Paranoid affect today.  Strikes out at staff on occasion.   Gets Seroquel prior to showers.   Staff were able to shower resident today, was agitated, but only verbal, one staff member able to assist with dressing.  Continues on Celexa 10 mg daily - dose increased in June " 2018.   Mirtazapine started two weeks ago due to low mood, agitation, and low appetite.  Mood and appetite have improved. Calm/cooperative today, less paranoid, makes jokes, follows simple direction.     Benign essential hypertension  Lisinopril stopped June 2018 after two ER visits with pre-syncope. No arrhythmia on ECG.  Syncope listed on problem list from previous PCP in [place], but no specific notes regarding etiology.  Today resident denies chest pain, palpitations, dizziness, headache.  Recent VS reviewed:  BP Readings from Last 3 Encounters:   12/11/18 152/80   11/27/18 132/77   10/28/18 140/71        Postablative hypothyroidism  History of Graves Disease with a multinodular toxic goiter.   Previously treated with methimazole and I-131 therapy in May 2016.   While in [place], was followed by endocrinology, lost to follow-up s/p move to [place].  Previously on levothyroxine 125 mcg, but dose decreased to 110 mcg 06/08/17 due to low TSH (0.71).   On 8/01/2017 Levothyroxine increased to 112 mcg daily due to slightly elevated TSH (5.06).   TSH 11/28 low normal, below goal of 3-5. Decreased levothyroxine dose 100 mcg PO once daily. Repeat TSH and free T4 on January 10 th.    TSH   Date Value Ref Range Status   11/28/2018 0.47 0.40 - 4.00 mU/L Final   06/28/2018 1.05 0.40 - 4.00 mU/L Final   02/21/2018 1.32 0.40 - 4.00 mU/L Final   10/31/2017 3.67 0.40 - 4.00 mU/L Final   09/20/2017 1.13 0.40 - 4.00 mU/L Final     Advance Care Planning  DNR/DNI.  Health care agent has wanted to focus on comfort and quality of life given advanced dementia.   Would like to consider hospice given progression of dementia over last year and low QoL.     ALLERGIES: Patient has no known allergies.     Past Medical, Surgical, Family and Social History reviewed and updated in [facility].    Current Outpatient Medications   Medication Sig Dispense Refill     camphor-menthol (SARNA) 0.5-0.5 % LOTN Apply 1 mL topically 2 times daily as needed 222 mL 98      citalopram (CELEXA) 10 MG tablet Take 1 tablet (10 mg) by mouth daily 60 tablet 98     FLUTICASONE PROPIONATE, NASAL, NA Spray 1 spray in nostril daily 1 spray daily. For Rhinitis       latanoprost (XALATAN) 0.005 % ophthalmic solution INSTILL ONE DROP IN EACH EYE EVERY NIGHT AT BEDTIME 2.5 mL 97     levothyroxine (SYNTHROID/LEVOTHROID) 100 MCG tablet TAKE 1 TABLET BY MOUTH ONCE DAILY 31 tablet 98     MAPAP 325 MG tablet TAKE TWO TABLETS (650MG) BY MOUTH FOUR TIMES A DAY AS NEEDED FOR PAIN 180 tablet 98     mirtazapine (REMERON) 7.5 MG tablet TAKE 1 TABLET BY MOUTH ONCE DAILY 31 tablet 98     Multiple Vitamins-Minerals (CEROVITE SENIOR) TABS TAKE 1 TABLET BY MOUTH ONCE DAILY 31 tablet 98     QUEtiapine Fumarate (SEROQUEL PO) Take 25 mg by mouth daily as needed On shower days.        tamsulosin (FLOMAX) 0.4 MG capsule TAKE 1 CAPSULE BY MOUTH ONCE DAILY 31 capsule 98     VITAMIN D3 1000 units tablet TAKE TWO TABLETS (2000 UNITS) BY MOUTH ONCE DAILY 62 tablet 98     Medications reviewed:  Medications reconciled to facility chart and changes were made to reflect current medications as identified as above med list. Below are the changes that were made:   Medications stopped since last EPIC medication reconciliation:   There are no discontinued medications.    Medications started since last [facility] medication reconciliation:  No orders of the defined types were placed in this encounter.    REVIEW OF SYSTEMS:  Unobtainable secondary to cognitive impairment.   As per usual, say he feels fine and nothing is wrong.     Physical Exam:  /80   Pulse 64   Temp 98.4  F (36.9  C)   Resp 18   Wt 68 kg (150 lb)   SpO2 99%   GENERAL APPEARANCE:  Alert, fully dressed, well groomed, sitting in activity room with group of residents.  HEENT: MMM w/o exudate, conjunctiva w/o injection, no drainage.  RESP:  Respiratory effort and palpation of chest normal, lungs clear to auscultation , no respiratory distress  CV:  Palpation and  auscultation of heart done, regular rate and rhythm, no murmur, rub, or gallop  PV: no edema, calves are supple and non-tender  GI: scaphoid abdomen, soft, non-tender, non-distended, + BS  M/S:   Gait and station shuffling, ambulating independently w/ 4WW, needs step by step cueing to use walker  SKIN:  No visible rashes, lesions or ulcerations. Skin without increased warmth or tenderness.  Neuro: Does not follow for cranial nerve testing, no facial asymmetry, no tremor, normal tone  PSYCH:  memory impaired, oriented to self only, speech fluent, judgment impaired, cooperative today.    Recent Labs:   CBC RESULTS:   Recent Labs   Lab Test 11/28/18 06/11/18  1138   WBC 5.7 5.3   RBC 4.09* 3.74*   HGB 13.3 12.0*   HCT 38.8* 36.3*   MCV 95 97   MCH 32.5 32.1   MCHC 34.3 33.1   RDW 13.8 14.4    160       Last Basic Metabolic Panel:  Recent Labs   Lab Test 11/28/18 06/28/18    136   POTASSIUM 4.0 3.6   CHLORIDE 101 102   RONALD 8.9 8.3*   CO2 28 27   BUN 12 15   CR 0.82 0.79   GLC 96 91       Liver Function Studies -   Recent Labs   Lab Test 11/28/18 06/06/18  1110   PROTTOTAL 6.9 7.0   ALBUMIN 3.4 3.2*   BILITOTAL 0.6 0.4   ALKPHOS 62 69   AST 17 19   ALT 17 19     TSH   Date Value Ref Range Status   11/28/2018 0.47 0.40 - 4.00 mU/L Final   06/28/2018 1.05 0.40 - 4.00 mU/L Final     Lab Results   Component Value Date    A1C 5.8 11/28/2018     Assessment/Plan:  (R63.4) Loss of weight  (primary encounter diagnosis)  Comment: Eating more, weight is trending up after addition of Mirtazapine and dose reduction of levothyroxine.  Plan:   - Monitor weights  - Shayla to bring Ensure and favorite foods  - Continue Mitrazapine 7.5 mg PO once daily at HS, may help with agitation and stimulate appetite     (G30.0,  F02.81) Early onset Alzheimer's disease with behavioral disturbance  Comment: Progressive cognitive and functional decline over last year.  Plan:   - Weight is trending up, will follow-up on 01/10/19, if weight  loss and decline were to continue will offer hospice  - Continues to benefit from supportive care in [facility]   - Continue Celexa and Mirtazapine, as well as Seroquel prior to showers. If agitation persists despite medication changes, consider scheduled Seroquel.  - Continue non-pharmacological intervention with agitation as per OT recommendations    (I10) Benign essential hypertension  Comment: BP slightly higher than 150 today off antihypertensives, no recent syncope, would not add medication at this time, repeat BP in one month at next follow-up.  Plan:   - Monitor routine VS    (E89.0) Postablative hypothyroidism  Comment: History of Graves Disease with a multinodular toxic goiter.   Previously treated with methimazole and I-131 therapy in May 2016.   Last TSH 0.47 on 11/28, levothyroxine dose lowered, lost to endocrine follow-up.  Plan:   - Continue levothyroxine 100 mcg daily - dose reduction 11/28  - Check TSH and free T4 on Jan 10th  - HC agent does not want to bring out to specialist unless would add to comfort    (Z71.89) Advance care planning  Comment: Progressive dementia, DNR/DNI, with recent weight loss, does not want TF, plan of care is comfort focus.   Plan:   - Called Shayla Parish (Friend/[facility]) no answer message left with my direct cell phone number requesting return call to discuss plan of care.    Electronically signed by  STAS Ziegler CNP                      Sincerely,        STAS Ziegler CNP

## 2018-12-11 NOTE — PROGRESS NOTES
"[facility]    Chief Complaint   Patient presents with     RECHECK       [facility] Medical Record Number:  [id number]  Place of Service where encounter took place:  [facility] [[id number]]    HPI:    Elie Marcano is a 78 year old  (1940), who is being seen today for an episodic care visit.      HPI information obtained from: facility chart records, facility staff, patient report and [facility] chart review.    Today's concern is:  Loss of Weight  Weight is down 18% over six month period.  Recently will not stay seated at table for long, will get up after taking a few bites.  Staff attributed change in appetite to hospitalization of tablemate, who has since returned from hospital.  No change in bowel or bladder habits. No nausea or vomiting. No apparent abd pain.  No sweats. No melena or hematochezia reported by staff.   Due to dementia, resident cannot offer meaningful history, repeats \"nothing is wrong with me.\"    Recent weights reviewed.  11/26   141#  11/19  153#  Wt Readings from Last 2 Encounters:   12/11/18 68 kg (150 lb)   11/27/18 64.3 kg (141 lb 12.8 oz)     Early onset Alzheimer's disease with behavioral disturbance  Resident of [facility] since July 2016 - moved from [place].   SLUMS on admission 11/30 July 2016. SLUMS 7/30 in July 2017.   Difficulty allowing for assistance w/ showering and ADLs. Was using ativan prior to bathing, initially helped with anxiety, but became more combative and agitated.  Today repeats the same - \"nothing is wrong me!\"   Paranoid affect today.  Strikes out at staff on occasion.   OT was following for staff training with good effect, but unclear if staff are still adhering to recommended approach.  More paranoia on visit the Dr. Dennis at the end of April, ativan stopped and started on Seroquel 25 mg PO daily at 7:30 am, but had two falls in June in setting of low blood pressure and Seroquel was stopped and changed to " PRN prior to bathing, unclear if resident is actually using Seroquel prior to showers. Aricept was also d/c'd.   Continues on Celexa 10 mg daily - dose increased in June 2018.     Benign essential hypertension  Lisinopril stopped June 2018 after two ER visits with pre-syncope. No arrhythmia on ECG.  Syncope listed on problem list from previous PCP in [place], but no specific notes regarding etiology.  Today resident denies chest pain, palpitations, dizziness, headache.  Recent VS reviewed:  BP Readings from Last 3 Encounters:   12/11/18 152/80   11/27/18 132/77   10/28/18 140/71     Postablative hypothyroidism  History of Graves Disease with a multinodular toxic goiter.   Previously treated with methimazole and I-131 therapy in May 2016.   While in [place], was followed by endocrinology, lost to follow-up s/p move to [place].  Previously on levothyroxine 125 mcg, but dose decreased to 110 mcg 06/08/17 due to low TSH (0.71).   On 8/01/2017 Levothyroxine increased to 112 mcg daily due to slightly elevated TSH (5.06).   TSH   Date Value Ref Range Status   02/21/2018 1.32 0.40 - 4.00 mU/L Final   10/31/2017 3.67 0.40 - 4.00 mU/L Final   09/20/2017 1.13 0.40 - 4.00 mU/L Final   08/01/2017 5.06 (A) 0.40 - 4.00 mU/L Final   06/08/2017 0.71 0.40 - 4.00 mU/L Final     BPH  No urinary symptoms reported by resident and no change in urinary habits reported by staff.   Continue on Flomax 0.4 mg daily.     Vitamin D Deficiency   No recent vitamin D level.  Ambulatory.  Managed with vitamin D 2,000 units daily.     Advance Care Planning  DNR/DNI.  Health care agent has wanted to focus on comfort and quality of life given advanced dementia.     ALLERGIES: Patient has no known allergies.     Past Medical, Surgical, Family and Social History reviewed and updated in [facility].    Current Outpatient Medications   Medication Sig Dispense Refill     camphor-menthol (SARNA) 0.5-0.5 % LOTN Apply 1 mL topically 2 times daily as needed 222 mL 98      citalopram (CELEXA) 10 MG tablet Take 1 tablet (10 mg) by mouth daily 60 tablet 98     FLUTICASONE PROPIONATE, NASAL, NA Spray 1 spray in nostril daily 1 spray daily. For Rhinitis       latanoprost (XALATAN) 0.005 % ophthalmic solution INSTILL ONE DROP IN EACH EYE EVERY NIGHT AT BEDTIME 2.5 mL 97     levothyroxine (SYNTHROID/LEVOTHROID) 100 MCG tablet TAKE 1 TABLET BY MOUTH ONCE DAILY 31 tablet 98     MAPAP 325 MG tablet TAKE TWO TABLETS (650MG) BY MOUTH FOUR TIMES A DAY AS NEEDED FOR PAIN 180 tablet 98     mirtazapine (REMERON) 7.5 MG tablet TAKE 1 TABLET BY MOUTH ONCE DAILY 31 tablet 98     Multiple Vitamins-Minerals (CEROVITE SENIOR) TABS TAKE 1 TABLET BY MOUTH ONCE DAILY 31 tablet 98     QUEtiapine Fumarate (SEROQUEL PO) Take 25 mg by mouth daily as needed On shower days.        tamsulosin (FLOMAX) 0.4 MG capsule TAKE 1 CAPSULE BY MOUTH ONCE DAILY 31 capsule 98     VITAMIN D3 1000 units tablet TAKE TWO TABLETS (2000 UNITS) BY MOUTH ONCE DAILY 62 tablet 98     Medications reviewed:  Medications reconciled to facility chart and changes were made to reflect current medications as identified as above med list. Below are the changes that were made:   Medications stopped since last EPIC medication reconciliation:   Medications Discontinued During This Encounter   Medication Reason     MIRTAZAPINE PO      LEVOTHYROXINE SODIUM PO        Medications started since last [facility] medication reconciliation:  No orders of the defined types were placed in this encounter.      REVIEW OF SYSTEMS:  Unobtainable secondary to cognitive impairment.     Physical Exam:  /80   Pulse 64   Temp 98.4  F (36.9  C)   Resp 18   Wt 68 kg (150 lb)   SpO2 99%   GENERAL APPEARANCE:  Alert, slightly agitated, fully dressed, wearing polo shirt backwards, pacing around unit.  HEENT: MMM w/o exudate, conjunctiva w/o injection, no drainage.  RESP:  Respiratory effort and palpation of chest normal, lungs clear to auscultation , no respiratory  "distress  CV:  Palpation and auscultation of heart done, regular rate and rhythm, no murmur, rub, or gallop  PV: no edema, calves are supple and non-tender  GI: scaphoid abdomen, soft, non-tender, non-distended, + BS  M/S:   Gait and station shuffling, ambulating independently w/ 4WW then without walker, attempting to enter other residents rooms.   SKIN:  No visible rashes, lesions or ulcerations. Skin without increased warmth or tenderness.  Neuro: Does not follow for cranial nerve testing, no facial asymmetry, no tremor, normal tone  PSYCH:  memory impaired, oriented to self only, speech fluent, judgment impaired, cooperative today, confused \"why are you doing this?\" not easily reassured and agitated, will not sit for exam.     Recent Labs:  CBC RESULTS:   Recent Labs   Lab Test 11/28/18 06/11/18  1138   WBC 5.7 5.3   RBC 4.09* 3.74*   HGB 13.3 12.0*   HCT 38.8* 36.3*   MCV 95 97   MCH 32.5 32.1   MCHC 34.3 33.1   RDW 13.8 14.4    160       Last Basic Metabolic Panel:  Recent Labs   Lab Test 11/28/18 06/28/18    136   POTASSIUM 4.0 3.6   CHLORIDE 101 102   RONALD 8.9 8.3*   CO2 28 27   BUN 12 15   CR 0.82 0.79   GLC 96 91       Liver Function Studies -   Recent Labs   Lab Test 11/28/18 06/06/18  1110   PROTTOTAL 6.9 7.0   ALBUMIN 3.4 3.2*   BILITOTAL 0.6 0.4   ALKPHOS 62 69   AST 17 19   ALT 17 19       TSH   Date Value Ref Range Status   11/28/2018 0.47 0.40 - 4.00 mU/L Final   06/28/2018 1.05 0.40 - 4.00 mU/L Final   ]    Lab Results   Component Value Date    A1C 5.8 11/28/2018       Assessment/Plan:  (R63.4) Loss of weight  (primary encounter diagnosis)  Comment: Weight is down about 30# over last six months. Eating less.   Plan:   - Check labs to rule out reversible cause: TSH, free T4, CBC, CMP, vitamin D, B12, A1C  - Reweigh patient to ensure most recent weight accurate  - Request family bring Ensure and favorite foods  - Start Mitrazapine 7.5 mg PO once daily at HS, may help with agitation and " "stimulate appetite     (G30.0,  F02.81) Early onset Alzheimer's disease with behavioral disturbance  Comment: Progressive cognitive and functional decline over last year.  Plan:   - If unable to find reversible cause of weight loss or address with supplements and medication would offer hospice  - Continues to benefit from supportive care in [facility]   - Continue non-pharmacological intervention with agitation as per OT recommendations    (I10) Benign essential hypertension  Comment: BP a goal of < 150/90 off antihypertensives, no recent syncope  Plan:   - Monitor routine VS  - Check CMP    (E89.0) Postablative hypothyroidism  Comment: History of Graves Disease with a multinodular toxic goiter.   Previously treated with methimazole and I-131 therapy in May 2016.   Last TSH 1.05 in June 2018, managed on synthroid, lost to endocrine follow-up.  Plan:   - Continue levothyroxine 112 mcg daily -- if dose too high could contribute to weight loss and agitation   - Check TSH and free T4  - HC agent does not want to bring out to specialist unless would add to comfort    (N40.0) Benign prostatic hyperplasia, unspecified whether lower urinary tract symptoms present  Comment: No change in urinary habits, no report of symptoms  Plan:   - Continue Flomax     (E55.9) Vitamin D deficiency  Comment: Maintained on supplement due to hx of vit D def  Plan:   - Continue vitamin D supplement  - Check vitamin D level    (Z71.89) Advance care planning  Comment: Progressive dementia, DNR/DNI, now with weight loss  Plan:   - Called Shayla Parish ([facility]) no answer message left with my direct cell phone number requesting return call to discuss plan moving forward and medication changes.     Addendum 11/28/18:  End 8:50 - 9:03 am (13 minutes)  Shayla called back, reviewed clinical status and plan of care.   Notes that Betito has always loved food. She has notice a decline in cognition over the last several months, \"agitation is increasing and " "dementia increasing.\" Agreeable to trial of Mirtazapine in addition to Celexa. Will bring Ensure next week.  \"If he qualifies for hopsice then let's do it\" - Betito was clear on wishes he would not want life prolonged if poor quality.     Electronically signed by  Doris Mir MA    "

## 2019-01-01 ENCOUNTER — ASSISTED LIVING VISIT (OUTPATIENT)
Dept: GERIATRICS | Facility: CLINIC | Age: 79
End: 2019-01-01
Payer: COMMERCIAL

## 2019-01-01 ENCOUNTER — PATIENT OUTREACH (OUTPATIENT)
Dept: GERIATRIC MEDICINE | Facility: CLINIC | Age: 79
End: 2019-01-01

## 2019-01-01 ENCOUNTER — APPOINTMENT (OUTPATIENT)
Dept: GENERAL RADIOLOGY | Facility: CLINIC | Age: 79
DRG: 481 | End: 2019-01-01
Attending: EMERGENCY MEDICINE
Payer: COMMERCIAL

## 2019-01-01 ENCOUNTER — APPOINTMENT (OUTPATIENT)
Dept: PHYSICAL THERAPY | Facility: CLINIC | Age: 79
DRG: 481 | End: 2019-01-01
Payer: COMMERCIAL

## 2019-01-01 ENCOUNTER — APPOINTMENT (OUTPATIENT)
Dept: GENERAL RADIOLOGY | Facility: CLINIC | Age: 79
DRG: 481 | End: 2019-01-01
Attending: INTERNAL MEDICINE
Payer: COMMERCIAL

## 2019-01-01 ENCOUNTER — TRANSFERRED RECORDS (OUTPATIENT)
Dept: HEALTH INFORMATION MANAGEMENT | Facility: CLINIC | Age: 79
End: 2019-01-01

## 2019-01-01 ENCOUNTER — APPOINTMENT (OUTPATIENT)
Dept: CT IMAGING | Facility: CLINIC | Age: 79
DRG: 481 | End: 2019-01-01
Attending: EMERGENCY MEDICINE
Payer: COMMERCIAL

## 2019-01-01 ENCOUNTER — TELEPHONE (OUTPATIENT)
Dept: GERIATRICS | Facility: CLINIC | Age: 79
End: 2019-01-01

## 2019-01-01 ENCOUNTER — HOSPITAL ENCOUNTER (INPATIENT)
Facility: CLINIC | Age: 79
LOS: 6 days | Discharge: NURSING, REHAB OR LONG-TERM CARE | DRG: 481 | End: 2019-09-09
Attending: EMERGENCY MEDICINE | Admitting: INTERNAL MEDICINE
Payer: COMMERCIAL

## 2019-01-01 ENCOUNTER — ANESTHESIA EVENT (OUTPATIENT)
Dept: SURGERY | Facility: CLINIC | Age: 79
DRG: 481 | End: 2019-01-01
Payer: COMMERCIAL

## 2019-01-01 ENCOUNTER — APPOINTMENT (OUTPATIENT)
Dept: GENERAL RADIOLOGY | Facility: CLINIC | Age: 79
DRG: 698 | End: 2019-01-01
Attending: EMERGENCY MEDICINE
Payer: COMMERCIAL

## 2019-01-01 ENCOUNTER — TELEPHONE (OUTPATIENT)
Facility: CLINIC | Age: 79
End: 2019-01-01

## 2019-01-01 ENCOUNTER — APPOINTMENT (OUTPATIENT)
Dept: PHYSICAL THERAPY | Facility: CLINIC | Age: 79
DRG: 481 | End: 2019-01-01
Attending: INTERNAL MEDICINE
Payer: COMMERCIAL

## 2019-01-01 ENCOUNTER — RECORDS - HEALTHEAST (OUTPATIENT)
Dept: LAB | Facility: CLINIC | Age: 79
End: 2019-01-01

## 2019-01-01 ENCOUNTER — ANESTHESIA (OUTPATIENT)
Dept: SURGERY | Facility: CLINIC | Age: 79
DRG: 481 | End: 2019-01-01
Payer: COMMERCIAL

## 2019-01-01 ENCOUNTER — APPOINTMENT (OUTPATIENT)
Dept: ULTRASOUND IMAGING | Facility: CLINIC | Age: 79
DRG: 698 | End: 2019-01-01
Attending: EMERGENCY MEDICINE
Payer: COMMERCIAL

## 2019-01-01 ENCOUNTER — HOSPITAL ENCOUNTER (INPATIENT)
Facility: CLINIC | Age: 79
LOS: 4 days | Discharge: HOSPICE | DRG: 698 | End: 2019-09-16
Attending: EMERGENCY MEDICINE | Admitting: INTERNAL MEDICINE
Payer: COMMERCIAL

## 2019-01-01 VITALS
SYSTOLIC BLOOD PRESSURE: 132 MMHG | RESPIRATION RATE: 16 BRPM | TEMPERATURE: 98 F | HEART RATE: 58 BPM | WEIGHT: 132 LBS | OXYGEN SATURATION: 95 % | DIASTOLIC BLOOD PRESSURE: 68 MMHG

## 2019-01-01 VITALS
TEMPERATURE: 98.4 F | SYSTOLIC BLOOD PRESSURE: 144 MMHG | DIASTOLIC BLOOD PRESSURE: 76 MMHG | HEART RATE: 53 BPM | WEIGHT: 138 LBS | RESPIRATION RATE: 20 BRPM | OXYGEN SATURATION: 99 %

## 2019-01-01 VITALS
TEMPERATURE: 97.8 F | WEIGHT: 139.55 LBS | BODY MASS INDEX: 18.9 KG/M2 | RESPIRATION RATE: 16 BRPM | OXYGEN SATURATION: 97 % | HEART RATE: 56 BPM | DIASTOLIC BLOOD PRESSURE: 58 MMHG | SYSTOLIC BLOOD PRESSURE: 116 MMHG | HEIGHT: 72 IN

## 2019-01-01 VITALS
TEMPERATURE: 98.4 F | WEIGHT: 132 LBS | SYSTOLIC BLOOD PRESSURE: 133 MMHG | DIASTOLIC BLOOD PRESSURE: 73 MMHG | RESPIRATION RATE: 20 BRPM | HEART RATE: 57 BPM

## 2019-01-01 VITALS
DIASTOLIC BLOOD PRESSURE: 72 MMHG | WEIGHT: 149 LBS | SYSTOLIC BLOOD PRESSURE: 114 MMHG | RESPIRATION RATE: 20 BRPM | OXYGEN SATURATION: 98 % | TEMPERATURE: 98.6 F | HEART RATE: 75 BPM

## 2019-01-01 VITALS
OXYGEN SATURATION: 98 % | SYSTOLIC BLOOD PRESSURE: 128 MMHG | RESPIRATION RATE: 19 BRPM | DIASTOLIC BLOOD PRESSURE: 68 MMHG | HEART RATE: 66 BPM | WEIGHT: 148 LBS

## 2019-01-01 DIAGNOSIS — I10 BENIGN ESSENTIAL HYPERTENSION: ICD-10-CM

## 2019-01-01 DIAGNOSIS — E89.0 POSTABLATIVE HYPOTHYROIDISM: ICD-10-CM

## 2019-01-01 DIAGNOSIS — F32.A DEPRESSION, UNSPECIFIED DEPRESSION TYPE: ICD-10-CM

## 2019-01-01 DIAGNOSIS — F02.818 EARLY ONSET ALZHEIMER'S DISEASE WITH BEHAVIORAL DISTURBANCE (H): ICD-10-CM

## 2019-01-01 DIAGNOSIS — A41.9 SEPTIC SHOCK (H): ICD-10-CM

## 2019-01-01 DIAGNOSIS — F32.89 OTHER DEPRESSION: ICD-10-CM

## 2019-01-01 DIAGNOSIS — N39.0 URINARY TRACT INFECTION ASSOCIATED WITH INDWELLING URETHRAL CATHETER, INITIAL ENCOUNTER (H): ICD-10-CM

## 2019-01-01 DIAGNOSIS — F02.818 EARLY ONSET ALZHEIMER'S DISEASE WITH BEHAVIORAL DISTURBANCE (H): Primary | ICD-10-CM

## 2019-01-01 DIAGNOSIS — H40.003 GLAUCOMA SUSPECT, BILATERAL: ICD-10-CM

## 2019-01-01 DIAGNOSIS — R65.21 SEPTIC SHOCK (H): ICD-10-CM

## 2019-01-01 DIAGNOSIS — E03.9 HYPOTHYROIDISM, UNSPECIFIED TYPE: ICD-10-CM

## 2019-01-01 DIAGNOSIS — T83.511A URINARY TRACT INFECTION ASSOCIATED WITH INDWELLING URETHRAL CATHETER, INITIAL ENCOUNTER (H): ICD-10-CM

## 2019-01-01 DIAGNOSIS — G30.0 EARLY ONSET ALZHEIMER'S DISEASE WITH BEHAVIORAL DISTURBANCE (H): ICD-10-CM

## 2019-01-01 DIAGNOSIS — R62.7 FAILURE TO THRIVE IN ADULT: Primary | ICD-10-CM

## 2019-01-01 DIAGNOSIS — G30.0 EARLY ONSET ALZHEIMER'S DISEASE WITH BEHAVIORAL DISTURBANCE (H): Primary | ICD-10-CM

## 2019-01-01 DIAGNOSIS — R63.4 LOSS OF WEIGHT: Primary | ICD-10-CM

## 2019-01-01 DIAGNOSIS — R63.4 LOSS OF WEIGHT: ICD-10-CM

## 2019-01-01 DIAGNOSIS — F02.80 EARLY ONSET ALZHEIMER'S DEMENTIA WITHOUT BEHAVIORAL DISTURBANCE (H): ICD-10-CM

## 2019-01-01 DIAGNOSIS — J31.0 CHRONIC RHINITIS: ICD-10-CM

## 2019-01-01 DIAGNOSIS — E87.6 HYPOKALEMIA: ICD-10-CM

## 2019-01-01 DIAGNOSIS — F03.90 DEMENTIA WITHOUT BEHAVIORAL DISTURBANCE, UNSPECIFIED DEMENTIA TYPE: ICD-10-CM

## 2019-01-01 DIAGNOSIS — R63.4 WEIGHT LOSS: Primary | ICD-10-CM

## 2019-01-01 DIAGNOSIS — Z51.5 END OF LIFE CARE: Primary | ICD-10-CM

## 2019-01-01 DIAGNOSIS — S72.001A HIP FRACTURE, RIGHT, CLOSED, INITIAL ENCOUNTER (H): ICD-10-CM

## 2019-01-01 DIAGNOSIS — A41.9 SEPSIS, DUE TO UNSPECIFIED ORGANISM: ICD-10-CM

## 2019-01-01 DIAGNOSIS — R79.89 ELEVATED LFTS: ICD-10-CM

## 2019-01-01 DIAGNOSIS — G30.0 EARLY ONSET ALZHEIMER'S DEMENTIA WITHOUT BEHAVIORAL DISTURBANCE (H): ICD-10-CM

## 2019-01-01 DIAGNOSIS — J31.0 CHRONIC RHINITIS: Primary | ICD-10-CM

## 2019-01-01 DIAGNOSIS — N40.0 BENIGN PROSTATIC HYPERPLASIA WITHOUT LOWER URINARY TRACT SYMPTOMS: ICD-10-CM

## 2019-01-01 LAB
ABO + RH BLD: NORMAL
ABO + RH BLD: NORMAL
ALBUMIN SERPL-MCNC: 2.1 G/DL (ref 3.4–5)
ALBUMIN SERPL-MCNC: 2.2 G/DL (ref 3.4–5)
ALBUMIN SERPL-MCNC: 2.5 G/DL (ref 3.4–5)
ALBUMIN SERPL-MCNC: 2.7 G/DL (ref 3.4–5)
ALBUMIN SERPL-MCNC: 3.5 G/DL (ref 3.4–5)
ALBUMIN UR-MCNC: >600 MG/DL
ALBUMIN UR-MCNC: NEGATIVE MG/DL
ALP SERPL-CCNC: 134 U/L (ref 40–150)
ALP SERPL-CCNC: 183 U/L (ref 40–150)
ALP SERPL-CCNC: 51 U/L (ref 40–150)
ALP SERPL-CCNC: 57 U/L (ref 40–150)
ALP SERPL-CCNC: 70 U/L (ref 40–150)
ALT SERPL W P-5'-P-CCNC: 118 U/L (ref 0–70)
ALT SERPL W P-5'-P-CCNC: 159 U/L (ref 0–70)
ALT SERPL W P-5'-P-CCNC: 16 U/L (ref 0–70)
ALT SERPL W P-5'-P-CCNC: 27 U/L (ref 0–70)
ALT SERPL-CCNC: 17 U/L (ref 0–70)
ANION GAP SERPL CALCULATED.3IONS-SCNC: 3 MMOL/L (ref 3–14)
ANION GAP SERPL CALCULATED.3IONS-SCNC: 3 MMOL/L (ref 3–14)
ANION GAP SERPL CALCULATED.3IONS-SCNC: 4 MMOL/L (ref 3–14)
ANION GAP SERPL CALCULATED.3IONS-SCNC: 4 MMOL/L (ref 3–14)
ANION GAP SERPL CALCULATED.3IONS-SCNC: 5 MMOL/L (ref 3–14)
ANION GAP SERPL CALCULATED.3IONS-SCNC: 5 MMOL/L (ref 3–14)
ANION GAP SERPL CALCULATED.3IONS-SCNC: 6 MMOL/L (ref 3–14)
ANION GAP SERPL CALCULATED.3IONS-SCNC: 7 MMOL/L (ref 3–14)
ANION GAP SERPL CALCULATED.3IONS-SCNC: 8 MMOL/L (ref 3–14)
ANION GAP SERPL CALCULATED.3IONS-SCNC: 8 MMOL/L (ref 3–14)
ANION GAP SERPL CALCULATED.3IONS-SCNC: 8 MMOL/L (ref 5–18)
ANION GAP SERPL CALCULATED.3IONS-SCNC: 9 MMOL/L (ref 3–14)
ANION GAP SERPL CALCULATED.3IONS-SCNC: 9 MMOL/L (ref 5–18)
APPEARANCE UR: ABNORMAL
APPEARANCE UR: ABNORMAL
APTT PPP: 30 SEC (ref 22–37)
AST SERPL W P-5'-P-CCNC: 123 U/L (ref 0–45)
AST SERPL W P-5'-P-CCNC: 20 U/L (ref 0–45)
AST SERPL W P-5'-P-CCNC: 281 U/L (ref 0–45)
AST SERPL W P-5'-P-CCNC: 29 U/L (ref 0–45)
AST SERPL-CCNC: 15 U/L (ref 0–45)
BACTERIA #/AREA URNS HPF: ABNORMAL /HPF
BACTERIA SPEC CULT: ABNORMAL
BACTERIA SPEC CULT: NO GROWTH
BASOPHILS # BLD AUTO: 0 10E9/L (ref 0–0.2)
BASOPHILS # BLD AUTO: 0 10E9/L (ref 0–0.2)
BASOPHILS NFR BLD AUTO: 0 %
BASOPHILS NFR BLD AUTO: 0.5 %
BILIRUB DIRECT SERPL-MCNC: 0.6 MG/DL (ref 0–0.2)
BILIRUB SERPL-MCNC: 0.5 MG/DL (ref 0.2–1.3)
BILIRUB SERPL-MCNC: 0.5 MG/DL (ref 0.2–1.3)
BILIRUB SERPL-MCNC: 0.6 MG/DL (ref 0.2–1.3)
BILIRUB SERPL-MCNC: 0.9 MG/DL (ref 0.2–1.3)
BILIRUB SERPL-MCNC: 1 MG/DL (ref 0.2–1.3)
BILIRUB UR QL STRIP: NEGATIVE
BILIRUB UR QL STRIP: NEGATIVE
BLD GP AB SCN SERPL QL: NORMAL
BLOOD BANK CMNT PATIENT-IMP: NORMAL
BUN SERPL-MCNC: 10 MG/DL (ref 7–30)
BUN SERPL-MCNC: 11 MG/DL (ref 7–30)
BUN SERPL-MCNC: 13 MG/DL (ref 7–30)
BUN SERPL-MCNC: 13 MG/DL (ref 7–30)
BUN SERPL-MCNC: 15 MG/DL (ref 7–30)
BUN SERPL-MCNC: 15 MG/DL (ref 7–30)
BUN SERPL-MCNC: 17 MG/DL (ref 7–30)
BUN SERPL-MCNC: 19 MG/DL (ref 7–30)
BUN SERPL-MCNC: 24 MG/DL (ref 8–28)
BUN SERPL-MCNC: 24 MG/DL (ref 8–28)
BUN SERPL-MCNC: 29 MG/DL (ref 7–30)
BUN SERPL-MCNC: 34 MG/DL (ref 7–30)
BUN SERPL-MCNC: 37 MG/DL (ref 7–30)
CALCIUM SERPL-MCNC: 7.6 MG/DL (ref 8.5–10.1)
CALCIUM SERPL-MCNC: 8 MG/DL (ref 8.5–10.1)
CALCIUM SERPL-MCNC: 8.1 MG/DL (ref 8.5–10.5)
CALCIUM SERPL-MCNC: 8.2 MG/DL (ref 8.5–10.1)
CALCIUM SERPL-MCNC: 8.2 MG/DL (ref 8.5–10.1)
CALCIUM SERPL-MCNC: 8.3 MG/DL (ref 8.5–10.1)
CALCIUM SERPL-MCNC: 8.4 MG/DL (ref 8.5–10.1)
CALCIUM SERPL-MCNC: 8.4 MG/DL (ref 8.5–10.1)
CALCIUM SERPL-MCNC: 8.9 MG/DL (ref 8.5–10.1)
CALCIUM SERPL-MCNC: 8.9 MG/DL (ref 8.5–10.1)
CALCIUM SERPL-MCNC: 9 MG/DL (ref 8.5–10.1)
CALCIUM SERPL-MCNC: 9.1 MG/DL (ref 8.5–10.5)
CALCIUM SERPL-MCNC: 9.2 MG/DL (ref 8.5–10.1)
CHLORIDE BLD-SCNC: 97 MMOL/L (ref 98–107)
CHLORIDE SERPL-SCNC: 100 MMOL/L (ref 94–109)
CHLORIDE SERPL-SCNC: 100 MMOL/L (ref 94–109)
CHLORIDE SERPL-SCNC: 101 MMOL/L (ref 94–109)
CHLORIDE SERPL-SCNC: 101 MMOL/L (ref 94–109)
CHLORIDE SERPL-SCNC: 103 MMOL/L (ref 94–109)
CHLORIDE SERPL-SCNC: 107 MMOL/L (ref 94–109)
CHLORIDE SERPL-SCNC: 107 MMOL/L (ref 94–109)
CHLORIDE SERPL-SCNC: 97 MMOL/L (ref 94–109)
CHLORIDE SERPL-SCNC: 98 MMOL/L (ref 94–109)
CHLORIDE SERPLBLD-SCNC: 102 MMOL/L (ref 94–109)
CHLORIDE SERPLBLD-SCNC: 99 MMOL/L (ref 94–109)
CO2 BLDCOV-SCNC: 18 MMOL/L (ref 21–28)
CO2 BLDCOV-SCNC: 25 MMOL/L (ref 21–28)
CO2 SERPL-SCNC: 21 MMOL/L (ref 20–32)
CO2 SERPL-SCNC: 22 MMOL/L (ref 20–32)
CO2 SERPL-SCNC: 23 MMOL/L (ref 20–32)
CO2 SERPL-SCNC: 24 MMOL/L (ref 22–31)
CO2 SERPL-SCNC: 24 MMOL/L (ref 22–31)
CO2 SERPL-SCNC: 25 MMOL/L (ref 20–32)
CO2 SERPL-SCNC: 25 MMOL/L (ref 20–32)
CO2 SERPL-SCNC: 27 MMOL/L (ref 20–32)
CO2 SERPL-SCNC: 29 MMOL/L (ref 20–32)
CO2 SERPL-SCNC: 29 MMOL/L (ref 20–32)
CO2 SERPL-SCNC: 30 MMOL/L (ref 20–32)
CO2 SERPL-SCNC: 30 MMOL/L (ref 20–32)
CO2 SERPL-SCNC: 31 MMOL/L (ref 20–32)
COLOR UR AUTO: ABNORMAL
COLOR UR AUTO: ABNORMAL
CREAT SERPL-MCNC: 0.69 MG/DL (ref 0.66–1.25)
CREAT SERPL-MCNC: 0.7 MG/DL (ref 0.66–1.25)
CREAT SERPL-MCNC: 0.71 MG/DL (ref 0.66–1.25)
CREAT SERPL-MCNC: 0.73 MG/DL (ref 0.66–1.25)
CREAT SERPL-MCNC: 0.75 MG/DL (ref 0.66–1.25)
CREAT SERPL-MCNC: 0.77 MG/DL (ref 0.66–1.25)
CREAT SERPL-MCNC: 0.79 MG/DL (ref 0.7–1.3)
CREAT SERPL-MCNC: 0.79 MG/DL (ref 0.7–1.3)
CREAT SERPL-MCNC: 0.82 MG/DL (ref 0.66–1.25)
CREAT SERPL-MCNC: 0.82 MG/DL (ref 0.66–1.25)
CREAT SERPL-MCNC: 0.84 MG/DL (ref 0.66–1.25)
CREAT SERPL-MCNC: 0.89 MG/DL (ref 0.66–1.25)
CREAT SERPL-MCNC: 0.9 MG/DL (ref 0.66–1.25)
CREAT SERPL-MCNC: 1.09 MG/DL (ref 0.66–1.25)
DIFFERENTIAL METHOD BLD: ABNORMAL
DIFFERENTIAL METHOD BLD: ABNORMAL
EOSINOPHIL # BLD AUTO: 0 10E9/L (ref 0–0.7)
EOSINOPHIL # BLD AUTO: 0.1 10E9/L (ref 0–0.7)
EOSINOPHIL NFR BLD AUTO: 0 %
EOSINOPHIL NFR BLD AUTO: 1.2 %
ERYTHROCYTE [DISTWIDTH] IN BLOOD BY AUTOMATED COUNT: 13.6 % (ref 10–15)
ERYTHROCYTE [DISTWIDTH] IN BLOOD BY AUTOMATED COUNT: 14.1 % (ref 10–15)
ERYTHROCYTE [DISTWIDTH] IN BLOOD BY AUTOMATED COUNT: 14.1 % (ref 11–14.5)
ERYTHROCYTE [DISTWIDTH] IN BLOOD BY AUTOMATED COUNT: 14.3 % (ref 10–15)
ERYTHROCYTE [DISTWIDTH] IN BLOOD BY AUTOMATED COUNT: 14.4 % (ref 10–15)
ERYTHROCYTE [DISTWIDTH] IN BLOOD BY AUTOMATED COUNT: 14.5 % (ref 10–15)
GFR SERPL CREATININE-BSD FRML MDRD: 63 ML/MIN/{1.73_M2}
GFR SERPL CREATININE-BSD FRML MDRD: 80 ML/MIN/{1.73_M2}
GFR SERPL CREATININE-BSD FRML MDRD: 80 ML/MIN/{1.73_M2}
GFR SERPL CREATININE-BSD FRML MDRD: 82 ML/MIN/{1.73_M2}
GFR SERPL CREATININE-BSD FRML MDRD: 83 ML/MIN/1.73M2
GFR SERPL CREATININE-BSD FRML MDRD: 83 ML/MIN/{1.73_M2}
GFR SERPL CREATININE-BSD FRML MDRD: 84 ML/MIN/1.73M2
GFR SERPL CREATININE-BSD FRML MDRD: 85 ML/MIN/{1.73_M2}
GFR SERPL CREATININE-BSD FRML MDRD: 87 ML/MIN/{1.73_M2}
GFR SERPL CREATININE-BSD FRML MDRD: 88 ML/MIN/{1.73_M2}
GFR SERPL CREATININE-BSD FRML MDRD: 88 ML/MIN/{1.73_M2}
GFR SERPL CREATININE-BSD FRML MDRD: 89 ML/MIN/{1.73_M2}
GFR SERPL CREATININE-BSD FRML MDRD: >60 ML/MIN/1.73M2
GFR SERPL CREATININE-BSD FRML MDRD: >60 ML/MIN/1.73M2
GLUCOSE BLD-MCNC: 109 MG/DL (ref 70–125)
GLUCOSE BLDC GLUCOMTR-MCNC: 100 MG/DL (ref 70–99)
GLUCOSE BLDC GLUCOMTR-MCNC: 111 MG/DL (ref 70–99)
GLUCOSE BLDC GLUCOMTR-MCNC: 119 MG/DL (ref 70–99)
GLUCOSE BLDC GLUCOMTR-MCNC: 122 MG/DL (ref 70–99)
GLUCOSE BLDC GLUCOMTR-MCNC: 136 MG/DL (ref 70–99)
GLUCOSE BLDC GLUCOMTR-MCNC: 89 MG/DL (ref 70–99)
GLUCOSE SERPL-MCNC: 101 MG/DL (ref 70–99)
GLUCOSE SERPL-MCNC: 102 MG/DL (ref 70–99)
GLUCOSE SERPL-MCNC: 105 MG/DL (ref 70–99)
GLUCOSE SERPL-MCNC: 109 MG/DL (ref 70–125)
GLUCOSE SERPL-MCNC: 111 MG/DL (ref 70–99)
GLUCOSE SERPL-MCNC: 113 MG/DL (ref 70–99)
GLUCOSE SERPL-MCNC: 114 MG/DL (ref 70–99)
GLUCOSE SERPL-MCNC: 125 MG/DL (ref 70–99)
GLUCOSE SERPL-MCNC: 132 MG/DL (ref 70–99)
GLUCOSE SERPL-MCNC: 91 MG/DL (ref 70–99)
GLUCOSE SERPL-MCNC: 95 MG/DL (ref 70–99)
GLUCOSE SERPL-MCNC: 97 MG/DL (ref 70–99)
GLUCOSE SERPL-MCNC: 99 MG/DL (ref 70–99)
GLUCOSE UR STRIP-MCNC: NEGATIVE MG/DL
GLUCOSE UR STRIP-MCNC: NEGATIVE MG/DL
HCT VFR BLD AUTO: 28.6 % (ref 40–53)
HCT VFR BLD AUTO: 29.8 % (ref 40–53)
HCT VFR BLD AUTO: 32.2 % (ref 40–53)
HCT VFR BLD AUTO: 33.1 % (ref 40–53)
HCT VFR BLD AUTO: 33.8 % (ref 40–53)
HCT VFR BLD AUTO: 36.1 % (ref 40–54)
HCT VFR BLD AUTO: 39.5 % (ref 40–53)
HCT VFR BLD AUTO: 40.4 % (ref 40–53)
HEMOGLOBIN: 13.2 G/DL (ref 13.3–17.7)
HGB BLD-MCNC: 10.1 G/DL (ref 13.3–17.7)
HGB BLD-MCNC: 10.9 G/DL (ref 13.3–17.7)
HGB BLD-MCNC: 11.2 G/DL (ref 13.3–17.7)
HGB BLD-MCNC: 11.2 G/DL (ref 13.3–17.7)
HGB BLD-MCNC: 11.8 G/DL (ref 13.3–17.7)
HGB BLD-MCNC: 11.8 G/DL (ref 13.3–17.7)
HGB BLD-MCNC: 12 G/DL (ref 14–18)
HGB BLD-MCNC: 14 G/DL (ref 13.3–17.7)
HGB BLD-MCNC: 9.9 G/DL (ref 13.3–17.7)
HGB UR QL STRIP: ABNORMAL
HGB UR QL STRIP: ABNORMAL
HYALINE CASTS #/AREA URNS LPF: 3 /LPF (ref 0–2)
IMM GRANULOCYTES # BLD: 0 10E9/L (ref 0–0.4)
IMM GRANULOCYTES # BLD: 0 10E9/L (ref 0–0.4)
IMM GRANULOCYTES NFR BLD: 0.2 %
IMM GRANULOCYTES NFR BLD: 0.9 %
INR PPP: 0.95 (ref 0.86–1.14)
INTERPRETATION ECG - MUSE: NORMAL
KETONES UR STRIP-MCNC: NEGATIVE MG/DL
KETONES UR STRIP-MCNC: NEGATIVE MG/DL
LACTATE BLD-SCNC: 1.8 MMOL/L (ref 0.7–2)
LACTATE BLD-SCNC: 1.9 MMOL/L (ref 0.7–2)
LACTATE BLD-SCNC: 3.6 MMOL/L (ref 0.7–2.1)
LACTATE BLD-SCNC: 4.4 MMOL/L (ref 0.7–2.1)
LEUKOCYTE ESTERASE UR QL STRIP: ABNORMAL
LEUKOCYTE ESTERASE UR QL STRIP: ABNORMAL
LYMPHOCYTES # BLD AUTO: 0.2 10E9/L (ref 0.8–5.3)
LYMPHOCYTES # BLD AUTO: 1.6 10E9/L (ref 0.8–5.3)
LYMPHOCYTES NFR BLD AUTO: 36.9 %
LYMPHOCYTES NFR BLD AUTO: 4.2 %
Lab: ABNORMAL
Lab: ABNORMAL
Lab: NORMAL
MAGNESIUM SERPL-MCNC: 1.6 MG/DL (ref 1.6–2.3)
MAGNESIUM SERPL-MCNC: 1.7 MG/DL (ref 1.6–2.3)
MCH RBC QN AUTO: 32 PG (ref 26.5–33)
MCH RBC QN AUTO: 32.4 PG (ref 26.5–33)
MCH RBC QN AUTO: 32.9 PG (ref 26.5–33)
MCH RBC QN AUTO: 32.9 PG (ref 27–34)
MCH RBC QN AUTO: 33.2 PG (ref 26.5–33)
MCHC RBC AUTO-ENTMCNC: 33.1 G/DL (ref 31.5–36.5)
MCHC RBC AUTO-ENTMCNC: 33.2 G/DL (ref 32–36)
MCHC RBC AUTO-ENTMCNC: 33.4 G/DL (ref 31.5–36.5)
MCHC RBC AUTO-ENTMCNC: 33.8 G/DL (ref 31.5–36.5)
MCHC RBC AUTO-ENTMCNC: 33.9 G/DL (ref 31.5–36.5)
MCHC RBC AUTO-ENTMCNC: 33.9 G/DL (ref 31.5–36.5)
MCHC RBC AUTO-ENTMCNC: 34.6 G/DL (ref 31.5–36.5)
MCHC RBC AUTO-ENTMCNC: 34.7 G/DL (ref 31.5–36.5)
MCV RBC AUTO: 95 FL (ref 78–100)
MCV RBC AUTO: 96 FL (ref 78–100)
MCV RBC AUTO: 96 FL (ref 78–100)
MCV RBC AUTO: 97 FL (ref 78–100)
MCV RBC AUTO: 98 FL (ref 78–100)
MCV RBC AUTO: 99 FL (ref 80–100)
MONOCYTES # BLD AUTO: 0.1 10E9/L (ref 0–1.3)
MONOCYTES # BLD AUTO: 0.5 10E9/L (ref 0–1.3)
MONOCYTES NFR BLD AUTO: 1.5 %
MONOCYTES NFR BLD AUTO: 10.6 %
MRSA DNA SPEC QL NAA+PROBE: NEGATIVE
MUCOUS THREADS #/AREA URNS LPF: PRESENT /LPF
NEUTROPHILS # BLD AUTO: 2.2 10E9/L (ref 1.6–8.3)
NEUTROPHILS # BLD AUTO: 4.3 10E9/L (ref 1.6–8.3)
NEUTROPHILS NFR BLD AUTO: 50.6 %
NEUTROPHILS NFR BLD AUTO: 93.4 %
NITRATE UR QL: NEGATIVE
NITRATE UR QL: NEGATIVE
NRBC # BLD AUTO: 0 10*3/UL
NRBC # BLD AUTO: 0 10*3/UL
NRBC BLD AUTO-RTO: 0 /100
NRBC BLD AUTO-RTO: 0 /100
OSMOLALITY UR: 182 MMOL/KG (ref 100–1200)
PCO2 BLDV: 37 MM HG (ref 40–50)
PCO2 BLDV: 40 MM HG (ref 40–50)
PH BLDV: 7.3 PH (ref 7.32–7.43)
PH BLDV: 7.4 PH (ref 7.32–7.43)
PH UR STRIP: 5.5 PH (ref 5–7)
PH UR STRIP: 8.5 PH (ref 5–7)
PHOSPHATE SERPL-MCNC: 2.1 MG/DL (ref 2.5–4.5)
PHOSPHATE SERPL-MCNC: 3 MG/DL (ref 2.5–4.5)
PLATELET # BLD AUTO: 125 10E9/L (ref 150–450)
PLATELET # BLD AUTO: 125 10E9/L (ref 150–450)
PLATELET # BLD AUTO: 128 10E9/L (ref 150–450)
PLATELET # BLD AUTO: 133 10E9/L (ref 150–450)
PLATELET # BLD AUTO: 138 10E9/L (ref 150–450)
PLATELET # BLD AUTO: 148 10E9/L (ref 150–450)
PLATELET # BLD AUTO: 150 10E9/L (ref 150–450)
PLATELET # BLD AUTO: 153 10E9/L (ref 150–450)
PLATELET # BLD AUTO: 167 10E9/L (ref 150–450)
PLATELET # BLD AUTO: 185 10E9/L (ref 150–450)
PLATELET # BLD AUTO: 230 THOU/UL (ref 140–440)
PMV BLD AUTO: 9.3 FL (ref 8.5–12.5)
PO2 BLDV: 25 MM HG (ref 25–47)
PO2 BLDV: 28 MM HG (ref 25–47)
POTASSIUM BLD-SCNC: 3.9 MMOL/L (ref 3.5–5)
POTASSIUM SERPL-SCNC: 2.6 MMOL/L (ref 3.4–5.3)
POTASSIUM SERPL-SCNC: 2.9 MMOL/L (ref 3.4–5.3)
POTASSIUM SERPL-SCNC: 3.2 MMOL/L (ref 3.4–5.3)
POTASSIUM SERPL-SCNC: 3.3 MMOL/L (ref 3.4–5.3)
POTASSIUM SERPL-SCNC: 3.6 MMOL/L (ref 3.4–5.3)
POTASSIUM SERPL-SCNC: 3.6 MMOL/L (ref 3.4–5.3)
POTASSIUM SERPL-SCNC: 3.8 MMOL/L (ref 3.4–5.3)
POTASSIUM SERPL-SCNC: 3.9 MMOL/L (ref 3.4–5.3)
POTASSIUM SERPL-SCNC: 3.9 MMOL/L (ref 3.5–5)
POTASSIUM SERPL-SCNC: 4 MMOL/L (ref 3.4–5.3)
POTASSIUM SERPL-SCNC: 4.1 MMOL/L (ref 3.4–5.3)
POTASSIUM SERPL-SCNC: 4.3 MMOL/L (ref 3.4–5.3)
PROT SERPL-MCNC: 5.1 G/DL (ref 6.8–8.8)
PROT SERPL-MCNC: 5.2 G/DL (ref 6.8–8.8)
PROT SERPL-MCNC: 5.6 G/DL (ref 6.8–8.8)
PROT SERPL-MCNC: 6 G/DL (ref 6.8–8.8)
PROT SERPL-MCNC: 7 G/DL (ref 6.8–8.8)
RBC # BLD AUTO: 2.98 10E12/L (ref 4.4–5.9)
RBC # BLD AUTO: 3.07 10E12/L (ref 4.4–5.9)
RBC # BLD AUTO: 3.31 10E12/L (ref 4.4–5.9)
RBC # BLD AUTO: 3.4 10E12/L (ref 4.4–5.9)
RBC # BLD AUTO: 3.46 10E12/L (ref 4.4–5.9)
RBC # BLD AUTO: 3.65 MILL/UL (ref 4.4–6.2)
RBC # BLD AUTO: 4.13 10E12/L (ref 4.4–5.9)
RBC # BLD AUTO: 4.26 10E12/L (ref 4.4–5.9)
RBC #/AREA URNS AUTO: 22 /HPF (ref 0–2)
RBC #/AREA URNS AUTO: >182 /HPF (ref 0–2)
SAO2 % BLDV FROM PO2: 41 %
SAO2 % BLDV FROM PO2: 54 %
SODIUM SERPL-SCNC: 130 MMOL/L (ref 136–145)
SODIUM SERPL-SCNC: 131 MMOL/L (ref 133–144)
SODIUM SERPL-SCNC: 132 MMOL/L (ref 133–144)
SODIUM SERPL-SCNC: 132 MMOL/L (ref 133–144)
SODIUM SERPL-SCNC: 134 MMOL/L (ref 133–144)
SODIUM SERPL-SCNC: 134 MMOL/L (ref 133–144)
SODIUM SERPL-SCNC: 135 MMOL/L (ref 133–144)
SODIUM SERPL-SCNC: 135 MMOL/L (ref 133–144)
SODIUM SERPL-SCNC: 137 MMOL/L (ref 133–144)
SODIUM SERPL-SCNC: 137 MMOL/L (ref 133–144)
SODIUM UR-SCNC: 39 MMOL/L
SOURCE: ABNORMAL
SOURCE: ABNORMAL
SP GR UR STRIP: 1.01 (ref 1–1.03)
SP GR UR STRIP: 1.01 (ref 1–1.03)
SPECIMEN EXP DATE BLD: NORMAL
SPECIMEN SOURCE: ABNORMAL
SPECIMEN SOURCE: NORMAL
SPECIMEN SOURCE: NORMAL
TSH SERPL-ACNC: 1 MU/L (ref 0.4–4)
UROBILINOGEN UR STRIP-MCNC: NORMAL MG/DL (ref 0–2)
UROBILINOGEN UR STRIP-MCNC: NORMAL MG/DL (ref 0–2)
WBC # BLD AUTO: 22.2 10E9/L (ref 4–11)
WBC # BLD AUTO: 4.3 10E9/L (ref 4–11)
WBC # BLD AUTO: 4.6 10E9/L (ref 4–11)
WBC # BLD AUTO: 4.6 10E9/L (ref 4–11)
WBC # BLD AUTO: 4.8 10E9/L (ref 4–11)
WBC #/AREA URNS AUTO: >182 /HPF (ref 0–5)
WBC #/AREA URNS AUTO: >182 /HPF (ref 0–5)
WBC CLUMPS #/AREA URNS HPF: PRESENT /HPF
WBC: 6.7 THOU/UL (ref 4–11)

## 2019-01-01 PROCEDURE — 0QSB34Z REPOSITION RIGHT LOWER FEMUR WITH INTERNAL FIXATION DEVICE, PERCUTANEOUS APPROACH: ICD-10-PCS | Performed by: ORTHOPAEDIC SURGERY

## 2019-01-01 PROCEDURE — 87040 BLOOD CULTURE FOR BACTERIA: CPT | Performed by: EMERGENCY MEDICINE

## 2019-01-01 PROCEDURE — 80053 COMPREHEN METABOLIC PANEL: CPT | Performed by: INTERNAL MEDICINE

## 2019-01-01 PROCEDURE — 25000132 ZZH RX MED GY IP 250 OP 250 PS 637: Performed by: INTERNAL MEDICINE

## 2019-01-01 PROCEDURE — 82565 ASSAY OF CREATININE: CPT | Performed by: ORTHOPAEDIC SURGERY

## 2019-01-01 PROCEDURE — 99223 1ST HOSP IP/OBS HIGH 75: CPT | Performed by: FAMILY MEDICINE

## 2019-01-01 PROCEDURE — 25000128 H RX IP 250 OP 636: Performed by: INTERNAL MEDICINE

## 2019-01-01 PROCEDURE — 84300 ASSAY OF URINE SODIUM: CPT | Performed by: INTERNAL MEDICINE

## 2019-01-01 PROCEDURE — 83605 ASSAY OF LACTIC ACID: CPT

## 2019-01-01 PROCEDURE — 25000132 ZZH RX MED GY IP 250 OP 250 PS 637: Performed by: ORTHOPAEDIC SURGERY

## 2019-01-01 PROCEDURE — 99207 ZZC MOONLIGHTING INDICATOR: CPT | Performed by: INTERNAL MEDICINE

## 2019-01-01 PROCEDURE — 82803 BLOOD GASES ANY COMBINATION: CPT

## 2019-01-01 PROCEDURE — 99233 SBSQ HOSP IP/OBS HIGH 50: CPT | Performed by: INTERNAL MEDICINE

## 2019-01-01 PROCEDURE — 96367 TX/PROPH/DG ADDL SEQ IV INF: CPT

## 2019-01-01 PROCEDURE — 36415 COLL VENOUS BLD VENIPUNCTURE: CPT | Performed by: INTERNAL MEDICINE

## 2019-01-01 PROCEDURE — 96374 THER/PROPH/DIAG INJ IV PUSH: CPT

## 2019-01-01 PROCEDURE — 25000125 ZZHC RX 250

## 2019-01-01 PROCEDURE — 99239 HOSP IP/OBS DSCHRG MGMT >30: CPT | Performed by: INTERNAL MEDICINE

## 2019-01-01 PROCEDURE — 25800030 ZZH RX IP 258 OP 636: Performed by: ANESTHESIOLOGY

## 2019-01-01 PROCEDURE — 85025 COMPLETE CBC W/AUTO DIFF WBC: CPT | Performed by: EMERGENCY MEDICINE

## 2019-01-01 PROCEDURE — 36415 COLL VENOUS BLD VENIPUNCTURE: CPT | Performed by: ORTHOPAEDIC SURGERY

## 2019-01-01 PROCEDURE — 97161 PT EVAL LOW COMPLEX 20 MIN: CPT | Mod: GP | Performed by: PHYSICAL THERAPIST

## 2019-01-01 PROCEDURE — 85610 PROTHROMBIN TIME: CPT | Performed by: EMERGENCY MEDICINE

## 2019-01-01 PROCEDURE — 71046 X-RAY EXAM CHEST 2 VIEWS: CPT

## 2019-01-01 PROCEDURE — 40000277 XR SURGERY CARM FLUORO LESS THAN 5 MIN W STILLS

## 2019-01-01 PROCEDURE — 80076 HEPATIC FUNCTION PANEL: CPT | Performed by: INTERNAL MEDICINE

## 2019-01-01 PROCEDURE — 20000003 ZZH R&B ICU

## 2019-01-01 PROCEDURE — 37000009 ZZH ANESTHESIA TECHNICAL FEE, EACH ADDTL 15 MIN: Performed by: ORTHOPAEDIC SURGERY

## 2019-01-01 PROCEDURE — 12000000 ZZH R&B MED SURG/OB

## 2019-01-01 PROCEDURE — 87086 URINE CULTURE/COLONY COUNT: CPT | Performed by: ORTHOPAEDIC SURGERY

## 2019-01-01 PROCEDURE — 83605 ASSAY OF LACTIC ACID: CPT | Performed by: INTERNAL MEDICINE

## 2019-01-01 PROCEDURE — 85027 COMPLETE CBC AUTOMATED: CPT | Performed by: INTERNAL MEDICINE

## 2019-01-01 PROCEDURE — 81001 URINALYSIS AUTO W/SCOPE: CPT | Performed by: INTERNAL MEDICINE

## 2019-01-01 PROCEDURE — 25800030 ZZH RX IP 258 OP 636: Performed by: INTERNAL MEDICINE

## 2019-01-01 PROCEDURE — 99291 CRITICAL CARE FIRST HOUR: CPT | Performed by: INTERNAL MEDICINE

## 2019-01-01 PROCEDURE — 96361 HYDRATE IV INFUSION ADD-ON: CPT

## 2019-01-01 PROCEDURE — 85049 AUTOMATED PLATELET COUNT: CPT | Performed by: INTERNAL MEDICINE

## 2019-01-01 PROCEDURE — 25000128 H RX IP 250 OP 636: Performed by: ORTHOPAEDIC SURGERY

## 2019-01-01 PROCEDURE — 81001 URINALYSIS AUTO W/SCOPE: CPT | Performed by: EMERGENCY MEDICINE

## 2019-01-01 PROCEDURE — 80048 BASIC METABOLIC PNL TOTAL CA: CPT | Performed by: INTERNAL MEDICINE

## 2019-01-01 PROCEDURE — 83735 ASSAY OF MAGNESIUM: CPT | Performed by: INTERNAL MEDICINE

## 2019-01-01 PROCEDURE — 97530 THERAPEUTIC ACTIVITIES: CPT | Mod: GP | Performed by: PHYSICAL THERAPIST

## 2019-01-01 PROCEDURE — 99232 SBSQ HOSP IP/OBS MODERATE 35: CPT | Performed by: INTERNAL MEDICINE

## 2019-01-01 PROCEDURE — 00000146 ZZHCL STATISTIC GLUCOSE BY METER IP

## 2019-01-01 PROCEDURE — 71045 X-RAY EXAM CHEST 1 VIEW: CPT

## 2019-01-01 PROCEDURE — C1713 ANCHOR/SCREW BN/BN,TIS/BN: HCPCS | Performed by: ORTHOPAEDIC SURGERY

## 2019-01-01 PROCEDURE — 87077 CULTURE AEROBIC IDENTIFY: CPT | Performed by: EMERGENCY MEDICINE

## 2019-01-01 PROCEDURE — 25000128 H RX IP 250 OP 636: Performed by: HOSPITALIST

## 2019-01-01 PROCEDURE — 87086 URINE CULTURE/COLONY COUNT: CPT | Performed by: EMERGENCY MEDICINE

## 2019-01-01 PROCEDURE — 86850 RBC ANTIBODY SCREEN: CPT | Performed by: EMERGENCY MEDICINE

## 2019-01-01 PROCEDURE — 25000128 H RX IP 250 OP 636: Performed by: EMERGENCY MEDICINE

## 2019-01-01 PROCEDURE — 25800030 ZZH RX IP 258 OP 636: Performed by: ORTHOPAEDIC SURGERY

## 2019-01-01 PROCEDURE — 71000012 ZZH RECOVERY PHASE 1 LEVEL 1 FIRST HR: Performed by: ORTHOPAEDIC SURGERY

## 2019-01-01 PROCEDURE — 36000063 ZZH SURGERY LEVEL 4 EA 15 ADDTL MIN: Performed by: ORTHOPAEDIC SURGERY

## 2019-01-01 PROCEDURE — 84132 ASSAY OF SERUM POTASSIUM: CPT | Performed by: INTERNAL MEDICINE

## 2019-01-01 PROCEDURE — 80048 BASIC METABOLIC PNL TOTAL CA: CPT | Performed by: EMERGENCY MEDICINE

## 2019-01-01 PROCEDURE — 80053 COMPREHEN METABOLIC PANEL: CPT | Performed by: EMERGENCY MEDICINE

## 2019-01-01 PROCEDURE — 25000128 H RX IP 250 OP 636: Performed by: PHYSICIAN ASSISTANT

## 2019-01-01 PROCEDURE — 97530 THERAPEUTIC ACTIVITIES: CPT | Mod: GP

## 2019-01-01 PROCEDURE — 85018 HEMOGLOBIN: CPT | Performed by: INTERNAL MEDICINE

## 2019-01-01 PROCEDURE — 73502 X-RAY EXAM HIP UNI 2-3 VIEWS: CPT

## 2019-01-01 PROCEDURE — 72125 CT NECK SPINE W/O DYE: CPT

## 2019-01-01 PROCEDURE — 87186 SC STD MICRODIL/AGAR DIL: CPT | Performed by: EMERGENCY MEDICINE

## 2019-01-01 PROCEDURE — 99207 ZZC CDG-CODE CATEGORY CHANGED: CPT | Performed by: NURSE PRACTITIONER

## 2019-01-01 PROCEDURE — 84100 ASSAY OF PHOSPHORUS: CPT | Performed by: INTERNAL MEDICINE

## 2019-01-01 PROCEDURE — 86900 BLOOD TYPING SEROLOGIC ABO: CPT | Performed by: EMERGENCY MEDICINE

## 2019-01-01 PROCEDURE — 96375 TX/PRO/DX INJ NEW DRUG ADDON: CPT

## 2019-01-01 PROCEDURE — 86901 BLOOD TYPING SEROLOGIC RH(D): CPT | Performed by: EMERGENCY MEDICINE

## 2019-01-01 PROCEDURE — 99223 1ST HOSP IP/OBS HIGH 75: CPT | Mod: AI | Performed by: INTERNAL MEDICINE

## 2019-01-01 PROCEDURE — 37000008 ZZH ANESTHESIA TECHNICAL FEE, 1ST 30 MIN: Performed by: ORTHOPAEDIC SURGERY

## 2019-01-01 PROCEDURE — 93005 ELECTROCARDIOGRAM TRACING: CPT

## 2019-01-01 PROCEDURE — 99285 EMERGENCY DEPT VISIT HI MDM: CPT | Mod: 25

## 2019-01-01 PROCEDURE — 87800 DETECT AGNT MULT DNA DIREC: CPT | Performed by: EMERGENCY MEDICINE

## 2019-01-01 PROCEDURE — 99207 ZZC CDG-MDM COMPONENT: MEETS LOW - DOWN CODED: CPT | Performed by: INTERNAL MEDICINE

## 2019-01-01 PROCEDURE — 25000566 ZZH SEVOFLURANE, EA 15 MIN: Performed by: ORTHOPAEDIC SURGERY

## 2019-01-01 PROCEDURE — 99207 ZZC CDG-MDM COMPONENT: MEETS MODERATE - UP CODED: CPT | Performed by: NURSE PRACTITIONER

## 2019-01-01 PROCEDURE — 25000128 H RX IP 250 OP 636

## 2019-01-01 PROCEDURE — 83735 ASSAY OF MAGNESIUM: CPT | Performed by: HOSPITALIST

## 2019-01-01 PROCEDURE — 25800030 ZZH RX IP 258 OP 636

## 2019-01-01 PROCEDURE — 97110 THERAPEUTIC EXERCISES: CPT | Mod: GP

## 2019-01-01 PROCEDURE — 76705 ECHO EXAM OF ABDOMEN: CPT

## 2019-01-01 PROCEDURE — 87088 URINE BACTERIA CULTURE: CPT | Performed by: EMERGENCY MEDICINE

## 2019-01-01 PROCEDURE — 82947 ASSAY GLUCOSE BLOOD QUANT: CPT | Performed by: INTERNAL MEDICINE

## 2019-01-01 PROCEDURE — 87640 STAPH A DNA AMP PROBE: CPT | Performed by: INTERNAL MEDICINE

## 2019-01-01 PROCEDURE — 40000170 ZZH STATISTIC PRE-PROCEDURE ASSESSMENT II: Performed by: ORTHOPAEDIC SURGERY

## 2019-01-01 PROCEDURE — 25800030 ZZH RX IP 258 OP 636: Performed by: EMERGENCY MEDICINE

## 2019-01-01 PROCEDURE — 96368 THER/DIAG CONCURRENT INF: CPT

## 2019-01-01 PROCEDURE — 25000125 ZZHC RX 250: Performed by: NURSE ANESTHETIST, CERTIFIED REGISTERED

## 2019-01-01 PROCEDURE — 99231 SBSQ HOSP IP/OBS SF/LOW 25: CPT | Performed by: UROLOGY

## 2019-01-01 PROCEDURE — 25800030 ZZH RX IP 258 OP 636: Performed by: NURSE ANESTHETIST, CERTIFIED REGISTERED

## 2019-01-01 PROCEDURE — 82565 ASSAY OF CREATININE: CPT | Performed by: INTERNAL MEDICINE

## 2019-01-01 PROCEDURE — 25000132 ZZH RX MED GY IP 250 OP 250 PS 637: Performed by: EMERGENCY MEDICINE

## 2019-01-01 PROCEDURE — 87641 MR-STAPH DNA AMP PROBE: CPT | Performed by: INTERNAL MEDICINE

## 2019-01-01 PROCEDURE — 25000128 H RX IP 250 OP 636: Performed by: ANESTHESIOLOGY

## 2019-01-01 PROCEDURE — 3E043XZ INTRODUCTION OF VASOPRESSOR INTO CENTRAL VEIN, PERCUTANEOUS APPROACH: ICD-10-PCS | Performed by: INTERNAL MEDICINE

## 2019-01-01 PROCEDURE — 72040 X-RAY EXAM NECK SPINE 2-3 VW: CPT

## 2019-01-01 PROCEDURE — 85730 THROMBOPLASTIN TIME PARTIAL: CPT | Performed by: EMERGENCY MEDICINE

## 2019-01-01 PROCEDURE — 96365 THER/PROPH/DIAG IV INF INIT: CPT

## 2019-01-01 PROCEDURE — 25000128 H RX IP 250 OP 636: Performed by: NURSE ANESTHETIST, CERTIFIED REGISTERED

## 2019-01-01 PROCEDURE — 70450 CT HEAD/BRAIN W/O DYE: CPT

## 2019-01-01 PROCEDURE — 85049 AUTOMATED PLATELET COUNT: CPT | Performed by: ORTHOPAEDIC SURGERY

## 2019-01-01 PROCEDURE — 83935 ASSAY OF URINE OSMOLALITY: CPT | Performed by: INTERNAL MEDICINE

## 2019-01-01 PROCEDURE — 27210794 ZZH OR GENERAL SUPPLY STERILE: Performed by: ORTHOPAEDIC SURGERY

## 2019-01-01 PROCEDURE — 25000125 ZZHC RX 250: Performed by: ORTHOPAEDIC SURGERY

## 2019-01-01 PROCEDURE — 36000065 ZZH SURGERY LEVEL 4 W FLUORO 1ST 30 MIN: Performed by: ORTHOPAEDIC SURGERY

## 2019-01-01 DEVICE — IMP SCR SYN CAN 6.5X16 THRDX95MM SS 208.414: Type: IMPLANTABLE DEVICE | Site: HIP | Status: FUNCTIONAL

## 2019-01-01 DEVICE — IMPLANTABLE DEVICE: Type: IMPLANTABLE DEVICE | Site: HIP | Status: FUNCTIONAL

## 2019-01-01 RX ORDER — ACETAMINOPHEN 325 MG/1
650 TABLET ORAL ONCE
Status: DISCONTINUED | OUTPATIENT
Start: 2019-01-01 | End: 2019-01-01

## 2019-01-01 RX ORDER — POTASSIUM CHLORIDE 1500 MG/1
20-40 TABLET, EXTENDED RELEASE ORAL
Status: DISCONTINUED | OUTPATIENT
Start: 2019-01-01 | End: 2019-01-01 | Stop reason: HOSPADM

## 2019-01-01 RX ORDER — ONDANSETRON 2 MG/ML
4 INJECTION INTRAMUSCULAR; INTRAVENOUS EVERY 30 MIN PRN
Status: DISCONTINUED | OUTPATIENT
Start: 2019-01-01 | End: 2019-01-01 | Stop reason: HOSPADM

## 2019-01-01 RX ORDER — HYDROMORPHONE HYDROCHLORIDE 1 MG/ML
.3-.5 INJECTION, SOLUTION INTRAMUSCULAR; INTRAVENOUS; SUBCUTANEOUS
Status: DISCONTINUED | OUTPATIENT
Start: 2019-01-01 | End: 2019-01-01 | Stop reason: HOSPADM

## 2019-01-01 RX ORDER — MIRTAZAPINE 15 MG/1
TABLET, FILM COATED ORAL
Qty: 31 TABLET | Refills: 98 | Status: SHIPPED | OUTPATIENT
Start: 2019-01-01 | End: 2019-01-01

## 2019-01-01 RX ORDER — PROCHLORPERAZINE 25 MG
12.5 SUPPOSITORY, RECTAL RECTAL EVERY 12 HOURS PRN
Status: CANCELLED | OUTPATIENT
Start: 2019-01-01

## 2019-01-01 RX ORDER — OXYCODONE HYDROCHLORIDE 5 MG/1
5-10 TABLET ORAL
Status: DISCONTINUED | OUTPATIENT
Start: 2019-01-01 | End: 2019-01-01

## 2019-01-01 RX ORDER — ACETAMINOPHEN 325 MG/1
325-650 TABLET ORAL EVERY 4 HOURS PRN
Qty: 100 TABLET | Refills: 1 | Status: SHIPPED | OUTPATIENT
Start: 2019-01-01

## 2019-01-01 RX ORDER — POTASSIUM CHLORIDE 7.45 MG/ML
10 INJECTION INTRAVENOUS
Status: DISCONTINUED | OUTPATIENT
Start: 2019-01-01 | End: 2019-01-01 | Stop reason: HOSPADM

## 2019-01-01 RX ORDER — POTASSIUM CHLORIDE 29.8 MG/ML
20 INJECTION INTRAVENOUS
Status: DISCONTINUED | OUTPATIENT
Start: 2019-01-01 | End: 2019-01-01 | Stop reason: HOSPADM

## 2019-01-01 RX ORDER — NALOXONE HYDROCHLORIDE 0.4 MG/ML
.1-.4 INJECTION, SOLUTION INTRAMUSCULAR; INTRAVENOUS; SUBCUTANEOUS
Status: DISCONTINUED | OUTPATIENT
Start: 2019-01-01 | End: 2019-01-01

## 2019-01-01 RX ORDER — SODIUM CHLORIDE 9 MG/ML
INJECTION, SOLUTION INTRAVENOUS CONTINUOUS
Status: DISCONTINUED | OUTPATIENT
Start: 2019-01-01 | End: 2019-01-01

## 2019-01-01 RX ORDER — ACETAMINOPHEN 325 MG/1
650 TABLET ORAL EVERY 4 HOURS PRN
Status: DISCONTINUED | OUTPATIENT
Start: 2019-01-01 | End: 2019-01-01

## 2019-01-01 RX ORDER — GLYCOPYRROLATE 0.2 MG/ML
INJECTION, SOLUTION INTRAMUSCULAR; INTRAVENOUS PRN
Status: DISCONTINUED | OUTPATIENT
Start: 2019-01-01 | End: 2019-01-01

## 2019-01-01 RX ORDER — POTASSIUM CL/LIDO/0.9 % NACL 10MEQ/0.1L
10 INTRAVENOUS SOLUTION, PIGGYBACK (ML) INTRAVENOUS
Status: DISCONTINUED | OUTPATIENT
Start: 2019-01-01 | End: 2019-01-01 | Stop reason: HOSPADM

## 2019-01-01 RX ORDER — LATANOPROST 50 UG/ML
1 SOLUTION/ DROPS OPHTHALMIC AT BEDTIME
Status: DISCONTINUED | OUTPATIENT
Start: 2019-01-01 | End: 2019-01-01 | Stop reason: HOSPADM

## 2019-01-01 RX ORDER — CEFAZOLIN SODIUM 1 G/3ML
1 INJECTION, POWDER, FOR SOLUTION INTRAMUSCULAR; INTRAVENOUS EVERY 8 HOURS
Status: COMPLETED | OUTPATIENT
Start: 2019-01-01 | End: 2019-01-01

## 2019-01-01 RX ORDER — CITALOPRAM HYDROBROMIDE 10 MG/1
10 TABLET ORAL DAILY
Status: DISCONTINUED | OUTPATIENT
Start: 2019-01-01 | End: 2019-01-01

## 2019-01-01 RX ORDER — HYDROMORPHONE HYDROCHLORIDE 1 MG/ML
0.2 INJECTION, SOLUTION INTRAMUSCULAR; INTRAVENOUS; SUBCUTANEOUS
Status: DISCONTINUED | OUTPATIENT
Start: 2019-01-01 | End: 2019-01-01 | Stop reason: HOSPADM

## 2019-01-01 RX ORDER — LORAZEPAM 2 MG/ML
.25-.5 CONCENTRATE ORAL EVERY 4 HOURS PRN
Qty: 30 ML | Refills: 1 | Status: SHIPPED | OUTPATIENT
Start: 2019-01-01

## 2019-01-01 RX ORDER — NALOXONE HYDROCHLORIDE 0.4 MG/ML
.1-.4 INJECTION, SOLUTION INTRAMUSCULAR; INTRAVENOUS; SUBCUTANEOUS
Status: CANCELLED | OUTPATIENT
Start: 2019-01-01

## 2019-01-01 RX ORDER — LEVOTHYROXINE SODIUM 100 UG/1
100 TABLET ORAL
Status: DISCONTINUED | OUTPATIENT
Start: 2019-01-01 | End: 2019-01-01

## 2019-01-01 RX ORDER — NOREPINEPHRINE BITARTRATE 0.06 MG/ML
.03-.4 INJECTION, SOLUTION INTRAVENOUS CONTINUOUS
Status: CANCELLED | OUTPATIENT
Start: 2019-01-01

## 2019-01-01 RX ORDER — CEFAZOLIN SODIUM 2 G/100ML
2 INJECTION, SOLUTION INTRAVENOUS
Status: COMPLETED | OUTPATIENT
Start: 2019-01-01 | End: 2019-01-01

## 2019-01-01 RX ORDER — OXYCODONE HYDROCHLORIDE 5 MG/1
5-10 TABLET ORAL EVERY 4 HOURS PRN
Status: DISCONTINUED | OUTPATIENT
Start: 2019-01-01 | End: 2019-01-01 | Stop reason: HOSPADM

## 2019-01-01 RX ORDER — DEXTROSE MONOHYDRATE, SODIUM CHLORIDE, AND POTASSIUM CHLORIDE 50; 1.49; 4.5 G/1000ML; G/1000ML; G/1000ML
INJECTION, SOLUTION INTRAVENOUS CONTINUOUS
Status: DISCONTINUED | OUTPATIENT
Start: 2019-01-01 | End: 2019-01-01

## 2019-01-01 RX ORDER — CEFTRIAXONE 1 G/1
1 INJECTION, POWDER, FOR SOLUTION INTRAMUSCULAR; INTRAVENOUS ONCE
Status: COMPLETED | OUTPATIENT
Start: 2019-01-01 | End: 2019-01-01

## 2019-01-01 RX ORDER — FLUTICASONE PROPIONATE 50 MCG
1 SPRAY, SUSPENSION (ML) NASAL DAILY
Status: DISCONTINUED | OUTPATIENT
Start: 2019-01-01 | End: 2019-01-01 | Stop reason: HOSPADM

## 2019-01-01 RX ORDER — QUETIAPINE FUMARATE 25 MG/1
25 TABLET, FILM COATED ORAL 2 TIMES DAILY PRN
DISCHARGE
Start: 2019-01-01

## 2019-01-01 RX ORDER — POTASSIUM CHLORIDE 29.8 MG/ML
20 INJECTION INTRAVENOUS
Status: DISCONTINUED | OUTPATIENT
Start: 2019-01-01 | End: 2019-01-01

## 2019-01-01 RX ORDER — OXYCODONE HYDROCHLORIDE 5 MG/1
2.5-5 TABLET ORAL EVERY 4 HOURS PRN
Qty: 10 TABLET | Refills: 0 | Status: ON HOLD | OUTPATIENT
Start: 2019-01-01 | End: 2019-01-01

## 2019-01-01 RX ORDER — BISACODYL 10 MG
SUPPOSITORY, RECTAL RECTAL
Qty: 12 SUPPOSITORY | Refills: 1 | Status: SHIPPED | OUTPATIENT
Start: 2019-01-01

## 2019-01-01 RX ORDER — MORPHINE SULFATE 2 MG/ML
1 INJECTION, SOLUTION INTRAMUSCULAR; INTRAVENOUS
Status: DISCONTINUED | OUTPATIENT
Start: 2019-01-01 | End: 2019-01-01

## 2019-01-01 RX ORDER — PIPERACILLIN SODIUM, TAZOBACTAM SODIUM 3; .375 G/15ML; G/15ML
3.38 INJECTION, POWDER, LYOPHILIZED, FOR SOLUTION INTRAVENOUS ONCE
Status: COMPLETED | OUTPATIENT
Start: 2019-01-01 | End: 2019-01-01

## 2019-01-01 RX ORDER — PROPOFOL 10 MG/ML
INJECTION, EMULSION INTRAVENOUS CONTINUOUS PRN
Status: DISCONTINUED | OUTPATIENT
Start: 2019-01-01 | End: 2019-01-01

## 2019-01-01 RX ORDER — ONDANSETRON 2 MG/ML
4 INJECTION INTRAMUSCULAR; INTRAVENOUS EVERY 6 HOURS PRN
Status: DISCONTINUED | OUTPATIENT
Start: 2019-01-01 | End: 2019-01-01 | Stop reason: HOSPADM

## 2019-01-01 RX ORDER — QUETIAPINE FUMARATE 25 MG/1
25 TABLET, FILM COATED ORAL 2 TIMES DAILY PRN
Status: DISCONTINUED | OUTPATIENT
Start: 2019-01-01 | End: 2019-01-01 | Stop reason: HOSPADM

## 2019-01-01 RX ORDER — HALOPERIDOL 5 MG/ML
INJECTION INTRAMUSCULAR
Status: COMPLETED
Start: 2019-01-01 | End: 2019-01-01

## 2019-01-01 RX ORDER — LEVOFLOXACIN 250 MG/1
250 TABLET, FILM COATED ORAL DAILY
Qty: 4 TABLET | DISCHARGE
Start: 2019-01-01

## 2019-01-01 RX ORDER — AMOXICILLIN 250 MG
1 CAPSULE ORAL 2 TIMES DAILY PRN
Status: DISCONTINUED | OUTPATIENT
Start: 2019-01-01 | End: 2019-01-01 | Stop reason: HOSPADM

## 2019-01-01 RX ORDER — EPHEDRINE SULFATE 50 MG/ML
INJECTION, SOLUTION INTRAMUSCULAR; INTRAVENOUS; SUBCUTANEOUS PRN
Status: DISCONTINUED | OUTPATIENT
Start: 2019-01-01 | End: 2019-01-01

## 2019-01-01 RX ORDER — ONDANSETRON 2 MG/ML
4 INJECTION INTRAMUSCULAR; INTRAVENOUS EVERY 6 HOURS PRN
Status: DISCONTINUED | OUTPATIENT
Start: 2019-01-01 | End: 2019-01-01

## 2019-01-01 RX ORDER — SODIUM CHLORIDE, SODIUM LACTATE, POTASSIUM CHLORIDE, CALCIUM CHLORIDE 600; 310; 30; 20 MG/100ML; MG/100ML; MG/100ML; MG/100ML
INJECTION, SOLUTION INTRAVENOUS CONTINUOUS
Status: DISCONTINUED | OUTPATIENT
Start: 2019-01-01 | End: 2019-01-01 | Stop reason: HOSPADM

## 2019-01-01 RX ORDER — WATER 10 ML/10ML
INJECTION INTRAMUSCULAR; INTRAVENOUS; SUBCUTANEOUS
Status: DISCONTINUED
Start: 2019-01-01 | End: 2019-01-01 | Stop reason: WASHOUT

## 2019-01-01 RX ORDER — VANCOMYCIN HYDROCHLORIDE 1 G/200ML
1000 INJECTION, SOLUTION INTRAVENOUS ONCE
Status: COMPLETED | OUTPATIENT
Start: 2019-01-01 | End: 2019-01-01

## 2019-01-01 RX ORDER — ONDANSETRON 4 MG/1
4 TABLET, ORALLY DISINTEGRATING ORAL EVERY 6 HOURS PRN
Status: CANCELLED | OUTPATIENT
Start: 2019-01-01

## 2019-01-01 RX ORDER — CEFAZOLIN SODIUM 1 G/3ML
1 INJECTION, POWDER, FOR SOLUTION INTRAMUSCULAR; INTRAVENOUS SEE ADMIN INSTRUCTIONS
Status: DISCONTINUED | OUTPATIENT
Start: 2019-01-01 | End: 2019-01-01 | Stop reason: HOSPADM

## 2019-01-01 RX ORDER — POTASSIUM CHLORIDE 1.5 G/1.58G
20-40 POWDER, FOR SOLUTION ORAL
Status: DISCONTINUED | OUTPATIENT
Start: 2019-01-01 | End: 2019-01-01 | Stop reason: HOSPADM

## 2019-01-01 RX ORDER — HYDROMORPHONE HYDROCHLORIDE 1 MG/ML
.3-.5 INJECTION, SOLUTION INTRAMUSCULAR; INTRAVENOUS; SUBCUTANEOUS EVERY 5 MIN PRN
Status: DISCONTINUED | OUTPATIENT
Start: 2019-01-01 | End: 2019-01-01 | Stop reason: HOSPADM

## 2019-01-01 RX ORDER — LIDOCAINE HYDROCHLORIDE 20 MG/ML
JELLY TOPICAL
Status: COMPLETED
Start: 2019-01-01 | End: 2019-01-01

## 2019-01-01 RX ORDER — NALOXONE HYDROCHLORIDE 0.4 MG/ML
.1-.4 INJECTION, SOLUTION INTRAMUSCULAR; INTRAVENOUS; SUBCUTANEOUS
Status: DISCONTINUED | OUTPATIENT
Start: 2019-01-01 | End: 2019-01-01 | Stop reason: HOSPADM

## 2019-01-01 RX ORDER — LIDOCAINE 40 MG/G
CREAM TOPICAL
Status: DISCONTINUED | OUTPATIENT
Start: 2019-01-01 | End: 2019-01-01

## 2019-01-01 RX ORDER — NOREPINEPHRINE BITARTRATE 0.06 MG/ML
0.03-0.4 INJECTION, SOLUTION INTRAVENOUS CONTINUOUS
Status: DISCONTINUED | OUTPATIENT
Start: 2019-01-01 | End: 2019-01-01

## 2019-01-01 RX ORDER — LIDOCAINE HYDROCHLORIDE 20 MG/ML
10 JELLY TOPICAL ONCE
Status: DISCONTINUED | OUTPATIENT
Start: 2019-01-01 | End: 2019-01-01 | Stop reason: HOSPADM

## 2019-01-01 RX ORDER — MORPHINE SULFATE 4 MG/ML
4 INJECTION, SOLUTION INTRAMUSCULAR; INTRAVENOUS
Status: DISCONTINUED | OUTPATIENT
Start: 2019-01-01 | End: 2019-01-01

## 2019-01-01 RX ORDER — CITALOPRAM HYDROBROMIDE 10 MG/1
10 TABLET ORAL DAILY
Status: DISCONTINUED | OUTPATIENT
Start: 2019-01-01 | End: 2019-01-01 | Stop reason: HOSPADM

## 2019-01-01 RX ORDER — LIDOCAINE HYDROCHLORIDE 20 MG/ML
10 JELLY TOPICAL ONCE
Status: COMPLETED | OUTPATIENT
Start: 2019-01-01 | End: 2019-01-01

## 2019-01-01 RX ORDER — ACETAMINOPHEN 325 MG/1
975 TABLET ORAL EVERY 8 HOURS
Status: COMPLETED | OUTPATIENT
Start: 2019-01-01 | End: 2019-01-01

## 2019-01-01 RX ORDER — ONDANSETRON 4 MG/1
4 TABLET, ORALLY DISINTEGRATING ORAL EVERY 6 HOURS PRN
Status: DISCONTINUED | OUTPATIENT
Start: 2019-01-01 | End: 2019-01-01 | Stop reason: HOSPADM

## 2019-01-01 RX ORDER — AMOXICILLIN 250 MG
1 CAPSULE ORAL 2 TIMES DAILY
Status: DISCONTINUED | OUTPATIENT
Start: 2019-01-01 | End: 2019-01-01 | Stop reason: HOSPADM

## 2019-01-01 RX ORDER — METOCLOPRAMIDE HYDROCHLORIDE 5 MG/ML
5 INJECTION INTRAMUSCULAR; INTRAVENOUS EVERY 6 HOURS PRN
Status: DISCONTINUED | OUTPATIENT
Start: 2019-01-01 | End: 2019-01-01 | Stop reason: HOSPADM

## 2019-01-01 RX ORDER — ONDANSETRON 2 MG/ML
4 INJECTION INTRAMUSCULAR; INTRAVENOUS EVERY 6 HOURS PRN
Status: CANCELLED | OUTPATIENT
Start: 2019-01-01

## 2019-01-01 RX ORDER — MIRTAZAPINE 15 MG/1
15 TABLET, FILM COATED ORAL AT BEDTIME
COMMUNITY

## 2019-01-01 RX ORDER — FENTANYL CITRATE 50 UG/ML
INJECTION, SOLUTION INTRAMUSCULAR; INTRAVENOUS PRN
Status: DISCONTINUED | OUTPATIENT
Start: 2019-01-01 | End: 2019-01-01

## 2019-01-01 RX ORDER — ONDANSETRON 4 MG/1
4 TABLET, ORALLY DISINTEGRATING ORAL EVERY 30 MIN PRN
Status: DISCONTINUED | OUTPATIENT
Start: 2019-01-01 | End: 2019-01-01 | Stop reason: HOSPADM

## 2019-01-01 RX ORDER — AMOXICILLIN 250 MG
2 CAPSULE ORAL 2 TIMES DAILY PRN
DISCHARGE
Start: 2019-01-01

## 2019-01-01 RX ORDER — MIRTAZAPINE 15 MG/1
15 TABLET, FILM COATED ORAL AT BEDTIME
Status: DISCONTINUED | OUTPATIENT
Start: 2019-01-01 | End: 2019-01-01 | Stop reason: HOSPADM

## 2019-01-01 RX ORDER — HYDROMORPHONE HYDROCHLORIDE 1 MG/ML
1-2 SOLUTION ORAL
Qty: 30 ML | Refills: 0 | Status: SHIPPED | OUTPATIENT
Start: 2019-01-01

## 2019-01-01 RX ORDER — METOCLOPRAMIDE HYDROCHLORIDE 5 MG/ML
5 INJECTION INTRAMUSCULAR; INTRAVENOUS EVERY 6 HOURS PRN
Status: CANCELLED | OUTPATIENT
Start: 2019-01-01

## 2019-01-01 RX ORDER — MAGNESIUM HYDROXIDE 1200 MG/15ML
LIQUID ORAL PRN
Status: DISCONTINUED | OUTPATIENT
Start: 2019-01-01 | End: 2019-01-01 | Stop reason: HOSPADM

## 2019-01-01 RX ORDER — NOREPINEPHRINE BITARTRATE 0.06 MG/ML
.03-.4 INJECTION, SOLUTION INTRAVENOUS CONTINUOUS
Status: DISCONTINUED | OUTPATIENT
Start: 2019-01-01 | End: 2019-01-01

## 2019-01-01 RX ORDER — POTASSIUM CL/LIDO/0.9 % NACL 10MEQ/0.1L
10 INTRAVENOUS SOLUTION, PIGGYBACK (ML) INTRAVENOUS ONCE
Status: COMPLETED | OUTPATIENT
Start: 2019-01-01 | End: 2019-01-01

## 2019-01-01 RX ORDER — FENTANYL CITRATE 50 UG/ML
25-50 INJECTION, SOLUTION INTRAMUSCULAR; INTRAVENOUS
Status: DISCONTINUED | OUTPATIENT
Start: 2019-01-01 | End: 2019-01-01 | Stop reason: HOSPADM

## 2019-01-01 RX ORDER — METOCLOPRAMIDE 5 MG/1
5 TABLET ORAL EVERY 6 HOURS PRN
Status: DISCONTINUED | OUTPATIENT
Start: 2019-01-01 | End: 2019-01-01 | Stop reason: HOSPADM

## 2019-01-01 RX ORDER — MULTIPLE VITAMINS W/ MINERALS TAB 9MG-400MCG
1 TAB ORAL AT BEDTIME
COMMUNITY

## 2019-01-01 RX ORDER — ATROPINE SULFATE 10 MG/ML
2-4 SOLUTION/ DROPS OPHTHALMIC
Qty: 5 ML | Refills: 1 | Status: SHIPPED | OUTPATIENT
Start: 2019-01-01

## 2019-01-01 RX ORDER — HYDROXYZINE HYDROCHLORIDE 10 MG/1
10 TABLET, FILM COATED ORAL EVERY 6 HOURS PRN
Status: DISCONTINUED | OUTPATIENT
Start: 2019-01-01 | End: 2019-01-01 | Stop reason: HOSPADM

## 2019-01-01 RX ORDER — MAGNESIUM SULFATE HEPTAHYDRATE 40 MG/ML
4 INJECTION, SOLUTION INTRAVENOUS EVERY 4 HOURS PRN
Status: DISCONTINUED | OUTPATIENT
Start: 2019-01-01 | End: 2019-01-01 | Stop reason: HOSPADM

## 2019-01-01 RX ORDER — DEXAMETHASONE SODIUM PHOSPHATE 4 MG/ML
INJECTION, SOLUTION INTRA-ARTICULAR; INTRALESIONAL; INTRAMUSCULAR; INTRAVENOUS; SOFT TISSUE PRN
Status: DISCONTINUED | OUTPATIENT
Start: 2019-01-01 | End: 2019-01-01

## 2019-01-01 RX ORDER — ONDANSETRON 2 MG/ML
INJECTION INTRAMUSCULAR; INTRAVENOUS PRN
Status: DISCONTINUED | OUTPATIENT
Start: 2019-01-01 | End: 2019-01-01

## 2019-01-01 RX ORDER — ACETAMINOPHEN 650 MG/1
650 SUPPOSITORY RECTAL EVERY 4 HOURS PRN
Qty: 12 SUPPOSITORY | Refills: 1 | Status: SHIPPED | OUTPATIENT
Start: 2019-01-01

## 2019-01-01 RX ORDER — LIDOCAINE 40 MG/G
CREAM TOPICAL
Status: DISCONTINUED | OUTPATIENT
Start: 2019-01-01 | End: 2019-01-01 | Stop reason: HOSPADM

## 2019-01-01 RX ORDER — ONDANSETRON 4 MG/1
4 TABLET, ORALLY DISINTEGRATING ORAL EVERY 6 HOURS PRN
Status: DISCONTINUED | OUTPATIENT
Start: 2019-01-01 | End: 2019-01-01

## 2019-01-01 RX ORDER — AMOXICILLIN 250 MG
2 CAPSULE ORAL 2 TIMES DAILY
Status: DISCONTINUED | OUTPATIENT
Start: 2019-01-01 | End: 2019-01-01 | Stop reason: HOSPADM

## 2019-01-01 RX ORDER — FLUTICASONE PROPIONATE 50 MCG
SPRAY, SUSPENSION (ML) NASAL
Qty: 16 G | Refills: 98 | Status: SHIPPED | OUTPATIENT
Start: 2019-01-01

## 2019-01-01 RX ORDER — MULTIPLE VITAMINS W/ MINERALS TAB 9MG-400MCG
1 TAB ORAL AT BEDTIME
Status: DISCONTINUED | OUTPATIENT
Start: 2019-01-01 | End: 2019-01-01 | Stop reason: HOSPADM

## 2019-01-01 RX ORDER — CITALOPRAM HYDROBROMIDE 10 MG/1
TABLET ORAL
Qty: 28 TABLET | Refills: 98 | Status: SHIPPED | OUTPATIENT
Start: 2019-01-01

## 2019-01-01 RX ORDER — ACETAMINOPHEN 325 MG/1
650 TABLET ORAL EVERY 4 HOURS PRN
Status: DISCONTINUED | OUTPATIENT
Start: 2019-01-01 | End: 2019-01-01 | Stop reason: HOSPADM

## 2019-01-01 RX ORDER — HALOPERIDOL 2 MG/ML
.5-1 SOLUTION ORAL EVERY 6 HOURS PRN
Qty: 30 ML | Refills: 1 | Status: SHIPPED | OUTPATIENT
Start: 2019-01-01

## 2019-01-01 RX ORDER — HEPARIN SODIUM 5000 [USP'U]/.5ML
5000 INJECTION, SOLUTION INTRAVENOUS; SUBCUTANEOUS EVERY 8 HOURS
Status: DISCONTINUED | OUTPATIENT
Start: 2019-01-01 | End: 2019-01-01

## 2019-01-01 RX ORDER — METOCLOPRAMIDE 5 MG/1
5 TABLET ORAL EVERY 6 HOURS PRN
Status: CANCELLED | OUTPATIENT
Start: 2019-01-01

## 2019-01-01 RX ORDER — AMOXICILLIN 250 MG
1 CAPSULE ORAL 2 TIMES DAILY PRN
Status: DISCONTINUED | OUTPATIENT
Start: 2019-01-01 | End: 2019-01-01

## 2019-01-01 RX ORDER — PROCHLORPERAZINE MALEATE 5 MG
5 TABLET ORAL EVERY 6 HOURS PRN
Status: CANCELLED | OUTPATIENT
Start: 2019-01-01

## 2019-01-01 RX ORDER — LIDOCAINE HYDROCHLORIDE 20 MG/ML
INJECTION, SOLUTION INFILTRATION; PERINEURAL PRN
Status: DISCONTINUED | OUTPATIENT
Start: 2019-01-01 | End: 2019-01-01

## 2019-01-01 RX ORDER — TAMSULOSIN HYDROCHLORIDE 0.4 MG/1
0.4 CAPSULE ORAL AT BEDTIME
Status: DISCONTINUED | OUTPATIENT
Start: 2019-01-01 | End: 2019-01-01 | Stop reason: HOSPADM

## 2019-01-01 RX ORDER — AMOXICILLIN 250 MG
2 CAPSULE ORAL 2 TIMES DAILY PRN
Status: DISCONTINUED | OUTPATIENT
Start: 2019-01-01 | End: 2019-01-01

## 2019-01-01 RX ORDER — HYDRALAZINE HYDROCHLORIDE 20 MG/ML
10 INJECTION INTRAMUSCULAR; INTRAVENOUS EVERY 4 HOURS PRN
Status: DISCONTINUED | OUTPATIENT
Start: 2019-01-01 | End: 2019-01-01 | Stop reason: HOSPADM

## 2019-01-01 RX ORDER — CHOLECALCIFEROL (VITAMIN D3) 50 MCG
2000 TABLET ORAL DAILY
Status: DISCONTINUED | OUTPATIENT
Start: 2019-01-01 | End: 2019-01-01 | Stop reason: HOSPADM

## 2019-01-01 RX ORDER — PIPERACILLIN SODIUM, TAZOBACTAM SODIUM 4; .5 G/20ML; G/20ML
4.5 INJECTION, POWDER, LYOPHILIZED, FOR SOLUTION INTRAVENOUS EVERY 6 HOURS
Status: DISCONTINUED | OUTPATIENT
Start: 2019-01-01 | End: 2019-01-01

## 2019-01-01 RX ORDER — HALOPERIDOL 5 MG/ML
2 INJECTION INTRAMUSCULAR EVERY 6 HOURS PRN
Status: DISCONTINUED | OUTPATIENT
Start: 2019-01-01 | End: 2019-01-01 | Stop reason: HOSPADM

## 2019-01-01 RX ORDER — QUETIAPINE FUMARATE 25 MG/1
25 TABLET, FILM COATED ORAL DAILY PRN
Status: DISCONTINUED | OUTPATIENT
Start: 2019-01-01 | End: 2019-01-01

## 2019-01-01 RX ORDER — ACETAMINOPHEN 650 MG/1
650 SUPPOSITORY RECTAL EVERY 4 HOURS PRN
Status: DISCONTINUED | OUTPATIENT
Start: 2019-01-01 | End: 2019-01-01 | Stop reason: HOSPADM

## 2019-01-01 RX ORDER — PROCHLORPERAZINE 25 MG
12.5 SUPPOSITORY, RECTAL RECTAL EVERY 12 HOURS PRN
Status: DISCONTINUED | OUTPATIENT
Start: 2019-01-01 | End: 2019-01-01 | Stop reason: HOSPADM

## 2019-01-01 RX ORDER — NOREPINEPHRINE BITARTRATE 0.06 MG/ML
INJECTION, SOLUTION INTRAVENOUS
Status: COMPLETED
Start: 2019-01-01 | End: 2019-01-01

## 2019-01-01 RX ORDER — POTASSIUM CHLORIDE 1500 MG/1
40 TABLET, EXTENDED RELEASE ORAL ONCE
Status: COMPLETED | OUTPATIENT
Start: 2019-01-01 | End: 2019-01-01

## 2019-01-01 RX ORDER — LEVOTHYROXINE SODIUM 100 UG/1
100 TABLET ORAL
Status: DISCONTINUED | OUTPATIENT
Start: 2019-01-01 | End: 2019-01-01 | Stop reason: HOSPADM

## 2019-01-01 RX ORDER — AMOXICILLIN 250 MG
2 CAPSULE ORAL 2 TIMES DAILY PRN
Status: DISCONTINUED | OUTPATIENT
Start: 2019-01-01 | End: 2019-01-01 | Stop reason: HOSPADM

## 2019-01-01 RX ORDER — POLYETHYLENE GLYCOL 3350 17 G/17G
17 POWDER, FOR SOLUTION ORAL DAILY PRN
Status: DISCONTINUED | OUTPATIENT
Start: 2019-01-01 | End: 2019-01-01 | Stop reason: HOSPADM

## 2019-01-01 RX ORDER — POLYETHYLENE GLYCOL 3350 17 G/17G
17 POWDER, FOR SOLUTION ORAL DAILY PRN
DISCHARGE
Start: 2019-01-01

## 2019-01-01 RX ORDER — PROPOFOL 10 MG/ML
INJECTION, EMULSION INTRAVENOUS PRN
Status: DISCONTINUED | OUTPATIENT
Start: 2019-01-01 | End: 2019-01-01

## 2019-01-01 RX ORDER — POTASSIUM CL/LIDO/0.9 % NACL 10MEQ/0.1L
10 INTRAVENOUS SOLUTION, PIGGYBACK (ML) INTRAVENOUS
Status: DISCONTINUED | OUTPATIENT
Start: 2019-01-01 | End: 2019-01-01

## 2019-01-01 RX ORDER — PROCHLORPERAZINE MALEATE 5 MG
5 TABLET ORAL EVERY 6 HOURS PRN
Status: DISCONTINUED | OUTPATIENT
Start: 2019-01-01 | End: 2019-01-01 | Stop reason: HOSPADM

## 2019-01-01 RX ORDER — SODIUM CHLORIDE 9 MG/ML
1000 INJECTION, SOLUTION INTRAVENOUS CONTINUOUS
Status: DISCONTINUED | OUTPATIENT
Start: 2019-01-01 | End: 2019-01-01

## 2019-01-01 RX ORDER — ACETAMINOPHEN 650 MG/1
650 SUPPOSITORY RECTAL ONCE
Status: COMPLETED | OUTPATIENT
Start: 2019-01-01 | End: 2019-01-01

## 2019-01-01 RX ORDER — NEOSTIGMINE METHYLSULFATE 1 MG/ML
VIAL (ML) INJECTION PRN
Status: DISCONTINUED | OUTPATIENT
Start: 2019-01-01 | End: 2019-01-01

## 2019-01-01 RX ORDER — PIPERACILLIN SODIUM, TAZOBACTAM SODIUM 3; .375 G/15ML; G/15ML
3.38 INJECTION, POWDER, LYOPHILIZED, FOR SOLUTION INTRAVENOUS EVERY 6 HOURS
Status: DISCONTINUED | OUTPATIENT
Start: 2019-01-01 | End: 2019-01-01

## 2019-01-01 RX ADMIN — LEVOTHYROXINE SODIUM 100 MCG: 100 TABLET ORAL at 06:32

## 2019-01-01 RX ADMIN — SODIUM CHLORIDE: 9 INJECTION, SOLUTION INTRAVENOUS at 22:32

## 2019-01-01 RX ADMIN — SENNOSIDES AND DOCUSATE SODIUM 1 TABLET: 8.6; 5 TABLET ORAL at 19:52

## 2019-01-01 RX ADMIN — ACETAMINOPHEN 975 MG: 325 TABLET ORAL at 08:37

## 2019-01-01 RX ADMIN — CITALOPRAM HYDROBROMIDE 10 MG: 10 TABLET ORAL at 08:10

## 2019-01-01 RX ADMIN — ACETAMINOPHEN 975 MG: 325 TABLET ORAL at 23:49

## 2019-01-01 RX ADMIN — FLUTICASONE PROPIONATE 1 SPRAY: 50 SPRAY, METERED NASAL at 08:37

## 2019-01-01 RX ADMIN — CHOLECALCIFEROL TAB 50 MCG (2000 UNIT) 2000 UNITS: 50 TAB at 08:34

## 2019-01-01 RX ADMIN — TAMSULOSIN HYDROCHLORIDE 0.4 MG: 0.4 CAPSULE ORAL at 21:10

## 2019-01-01 RX ADMIN — POTASSIUM CHLORIDE, DEXTROSE MONOHYDRATE AND SODIUM CHLORIDE: 150; 5; 450 INJECTION, SOLUTION INTRAVENOUS at 16:43

## 2019-01-01 RX ADMIN — ACETAMINOPHEN 975 MG: 325 TABLET ORAL at 08:34

## 2019-01-01 RX ADMIN — LEVOTHYROXINE SODIUM 100 MCG: 100 TABLET ORAL at 07:05

## 2019-01-01 RX ADMIN — MULTIPLE VITAMINS W/ MINERALS TAB 1 TABLET: TAB at 21:10

## 2019-01-01 RX ADMIN — CEFAZOLIN SODIUM 2 G: 2 INJECTION, SOLUTION INTRAVENOUS at 12:41

## 2019-01-01 RX ADMIN — SENNOSIDES AND DOCUSATE SODIUM 2 TABLET: 8.6; 5 TABLET ORAL at 21:10

## 2019-01-01 RX ADMIN — ACETAMINOPHEN 975 MG: 325 TABLET ORAL at 16:08

## 2019-01-01 RX ADMIN — QUETIAPINE 25 MG: 25 TABLET ORAL at 19:44

## 2019-01-01 RX ADMIN — HYDROCORTISONE SODIUM SUCCINATE 50 MG: 100 INJECTION, POWDER, FOR SOLUTION INTRAMUSCULAR; INTRAVENOUS at 08:42

## 2019-01-01 RX ADMIN — Medication 10 MG: at 13:16

## 2019-01-01 RX ADMIN — CHOLECALCIFEROL TAB 50 MCG (2000 UNIT) 2000 UNITS: 50 TAB at 08:37

## 2019-01-01 RX ADMIN — LIDOCAINE HYDROCHLORIDE 6 ML: 20 JELLY TOPICAL at 11:02

## 2019-01-01 RX ADMIN — ACETAMINOPHEN 975 MG: 325 TABLET ORAL at 16:33

## 2019-01-01 RX ADMIN — SODIUM CHLORIDE, POTASSIUM CHLORIDE, SODIUM LACTATE AND CALCIUM CHLORIDE: 600; 310; 30; 20 INJECTION, SOLUTION INTRAVENOUS at 12:26

## 2019-01-01 RX ADMIN — CITALOPRAM HYDROBROMIDE 10 MG: 10 TABLET ORAL at 08:34

## 2019-01-01 RX ADMIN — HALOPERIDOL LACTATE 2 MG: 5 INJECTION INTRAMUSCULAR at 01:52

## 2019-01-01 RX ADMIN — SODIUM CHLORIDE 1000 ML: 9 INJECTION, SOLUTION INTRAVENOUS at 15:37

## 2019-01-01 RX ADMIN — SODIUM CHLORIDE 1000 ML: 9 INJECTION, SOLUTION INTRAVENOUS at 16:20

## 2019-01-01 RX ADMIN — ENOXAPARIN SODIUM 40 MG: 40 INJECTION SUBCUTANEOUS at 14:35

## 2019-01-01 RX ADMIN — LEVOTHYROXINE SODIUM 100 MCG: 100 TABLET ORAL at 06:52

## 2019-01-01 RX ADMIN — ACETAMINOPHEN 975 MG: 325 TABLET ORAL at 00:46

## 2019-01-01 RX ADMIN — PIPERACILLIN AND TAZOBACTAM 4.5 G: 4; .5 INJECTION, POWDER, FOR SOLUTION INTRAVENOUS at 10:03

## 2019-01-01 RX ADMIN — PIPERACILLIN AND TAZOBACTAM 4.5 G: 4; .5 INJECTION, POWDER, FOR SOLUTION INTRAVENOUS at 21:18

## 2019-01-01 RX ADMIN — FLUTICASONE PROPIONATE 1 SPRAY: 50 SPRAY, METERED NASAL at 08:10

## 2019-01-01 RX ADMIN — PIPERACILLIN SODIUM,TAZOBACTAM SODIUM 3.38 G: 3; .375 INJECTION, POWDER, FOR SOLUTION INTRAVENOUS at 00:26

## 2019-01-01 RX ADMIN — LEVOTHYROXINE SODIUM 100 MCG: 100 TABLET ORAL at 06:25

## 2019-01-01 RX ADMIN — LIDOCAINE HYDROCHLORIDE 10 ML: 20 JELLY TOPICAL at 14:46

## 2019-01-01 RX ADMIN — Medication 10 MEQ: at 21:50

## 2019-01-01 RX ADMIN — PIPERACILLIN SODIUM,TAZOBACTAM SODIUM 3.38 G: 3; .375 INJECTION, POWDER, FOR SOLUTION INTRAVENOUS at 18:16

## 2019-01-01 RX ADMIN — PHENYLEPHRINE HYDROCHLORIDE 200 MCG: 10 INJECTION INTRAVENOUS at 12:57

## 2019-01-01 RX ADMIN — CHOLECALCIFEROL TAB 50 MCG (2000 UNIT) 2000 UNITS: 50 TAB at 09:29

## 2019-01-01 RX ADMIN — QUETIAPINE 25 MG: 25 TABLET ORAL at 22:32

## 2019-01-01 RX ADMIN — ENOXAPARIN SODIUM 40 MG: 40 INJECTION SUBCUTANEOUS at 14:28

## 2019-01-01 RX ADMIN — MIRTAZAPINE 15 MG: 15 TABLET, FILM COATED ORAL at 22:31

## 2019-01-01 RX ADMIN — SENNOSIDES AND DOCUSATE SODIUM 1 TABLET: 8.6; 5 TABLET ORAL at 08:32

## 2019-01-01 RX ADMIN — SODIUM CHLORIDE 1000 ML: 9 INJECTION, SOLUTION INTRAVENOUS at 15:41

## 2019-01-01 RX ADMIN — CEFAZOLIN 1 G: 1 INJECTION, POWDER, FOR SOLUTION INTRAMUSCULAR; INTRAVENOUS at 04:56

## 2019-01-01 RX ADMIN — Medication 10 MEQ: at 23:00

## 2019-01-01 RX ADMIN — TAMSULOSIN HYDROCHLORIDE 0.4 MG: 0.4 CAPSULE ORAL at 20:18

## 2019-01-01 RX ADMIN — LATANOPROST 1 DROP: 50 SOLUTION/ DROPS OPHTHALMIC at 21:10

## 2019-01-01 RX ADMIN — HEPARIN SODIUM 5000 UNITS: 5000 INJECTION, SOLUTION INTRAVENOUS; SUBCUTANEOUS at 21:37

## 2019-01-01 RX ADMIN — ENOXAPARIN SODIUM 40 MG: 40 INJECTION SUBCUTANEOUS at 13:49

## 2019-01-01 RX ADMIN — LATANOPROST 1 DROP: 50 SOLUTION/ DROPS OPHTHALMIC at 21:22

## 2019-01-01 RX ADMIN — PROPOFOL 100 MCG/KG/MIN: 10 INJECTION, EMULSION INTRAVENOUS at 12:57

## 2019-01-01 RX ADMIN — SODIUM CHLORIDE: 9 INJECTION, SOLUTION INTRAVENOUS at 21:30

## 2019-01-01 RX ADMIN — ACETAMINOPHEN 650 MG: 650 SUPPOSITORY RECTAL at 16:07

## 2019-01-01 RX ADMIN — NOREPINEPHRINE BITARTRATE 0.03 MCG/KG/MIN: 0.06 INJECTION, SOLUTION INTRAVENOUS at 18:20

## 2019-01-01 RX ADMIN — Medication 10 MEQ: at 00:00

## 2019-01-01 RX ADMIN — VASOPRESSIN 2.4 UNITS/HR: 20 INJECTION INTRAVENOUS at 13:19

## 2019-01-01 RX ADMIN — SENNOSIDES AND DOCUSATE SODIUM 1 TABLET: 8.6; 5 TABLET ORAL at 21:15

## 2019-01-01 RX ADMIN — CITALOPRAM HYDROBROMIDE 10 MG: 10 TABLET ORAL at 08:37

## 2019-01-01 RX ADMIN — QUETIAPINE 25 MG: 25 TABLET ORAL at 05:30

## 2019-01-01 RX ADMIN — MIRTAZAPINE 15 MG: 15 TABLET, FILM COATED ORAL at 21:15

## 2019-01-01 RX ADMIN — ONDANSETRON 4 MG: 2 INJECTION INTRAMUSCULAR; INTRAVENOUS at 13:42

## 2019-01-01 RX ADMIN — FLUTICASONE PROPIONATE 1 SPRAY: 50 SPRAY, METERED NASAL at 09:29

## 2019-01-01 RX ADMIN — ACETAMINOPHEN 650 MG: 325 TABLET, FILM COATED ORAL at 08:32

## 2019-01-01 RX ADMIN — SENNOSIDES AND DOCUSATE SODIUM 2 TABLET: 8.6; 5 TABLET ORAL at 08:37

## 2019-01-01 RX ADMIN — LIDOCAINE HYDROCHLORIDE 80 MG: 20 INJECTION, SOLUTION INFILTRATION; PERINEURAL at 12:36

## 2019-01-01 RX ADMIN — MULTIPLE VITAMINS W/ MINERALS TAB 1 TABLET: TAB at 19:52

## 2019-01-01 RX ADMIN — PIPERACILLIN AND TAZOBACTAM 4.5 G: 4; .5 INJECTION, POWDER, FOR SOLUTION INTRAVENOUS at 20:08

## 2019-01-01 RX ADMIN — PIPERACILLIN AND TAZOBACTAM 4.5 G: 4; .5 INJECTION, POWDER, FOR SOLUTION INTRAVENOUS at 09:16

## 2019-01-01 RX ADMIN — Medication 10 MEQ: at 01:02

## 2019-01-01 RX ADMIN — FENTANYL CITRATE 25 MCG: 50 INJECTION, SOLUTION INTRAMUSCULAR; INTRAVENOUS at 12:37

## 2019-01-01 RX ADMIN — ACETAMINOPHEN 975 MG: 325 TABLET ORAL at 00:51

## 2019-01-01 RX ADMIN — DEXAMETHASONE SODIUM PHOSPHATE 4 MG: 4 INJECTION, SOLUTION INTRA-ARTICULAR; INTRALESIONAL; INTRAMUSCULAR; INTRAVENOUS; SOFT TISSUE at 13:01

## 2019-01-01 RX ADMIN — ACETAMINOPHEN 650 MG: 325 TABLET ORAL at 22:32

## 2019-01-01 RX ADMIN — MULTIPLE VITAMINS W/ MINERALS TAB 1 TABLET: TAB at 21:25

## 2019-01-01 RX ADMIN — HYDROCORTISONE SODIUM SUCCINATE 50 MG: 100 INJECTION, POWDER, FOR SOLUTION INTRAMUSCULAR; INTRAVENOUS at 02:04

## 2019-01-01 RX ADMIN — FENTANYL CITRATE 25 MCG: 50 INJECTION, SOLUTION INTRAMUSCULAR; INTRAVENOUS at 13:37

## 2019-01-01 RX ADMIN — TAMSULOSIN HYDROCHLORIDE 0.4 MG: 0.4 CAPSULE ORAL at 21:24

## 2019-01-01 RX ADMIN — Medication 10 MEQ: at 02:40

## 2019-01-01 RX ADMIN — MULTIPLE VITAMINS W/ MINERALS TAB 1 TABLET: TAB at 21:16

## 2019-01-01 RX ADMIN — PIPERACILLIN SODIUM,TAZOBACTAM SODIUM 3.38 G: 3; .375 INJECTION, POWDER, FOR SOLUTION INTRAVENOUS at 06:16

## 2019-01-01 RX ADMIN — CITALOPRAM HYDROBROMIDE 10 MG: 10 TABLET ORAL at 09:29

## 2019-01-01 RX ADMIN — PROPOFOL 100 MG: 10 INJECTION, EMULSION INTRAVENOUS at 12:37

## 2019-01-01 RX ADMIN — HALOPERIDOL LACTATE 2 MG: 5 INJECTION INTRAMUSCULAR at 17:36

## 2019-01-01 RX ADMIN — ENOXAPARIN SODIUM 40 MG: 40 INJECTION SUBCUTANEOUS at 14:08

## 2019-01-01 RX ADMIN — LEVOTHYROXINE SODIUM 100 MCG: 100 TABLET ORAL at 06:31

## 2019-01-01 RX ADMIN — HYDROCORTISONE SODIUM SUCCINATE 50 MG: 100 INJECTION, POWDER, FOR SOLUTION INTRAMUSCULAR; INTRAVENOUS at 21:37

## 2019-01-01 RX ADMIN — FLUTICASONE PROPIONATE 1 SPRAY: 50 SPRAY, METERED NASAL at 08:34

## 2019-01-01 RX ADMIN — PIPERACILLIN AND TAZOBACTAM 4.5 G: 4; .5 INJECTION, POWDER, FOR SOLUTION INTRAVENOUS at 04:39

## 2019-01-01 RX ADMIN — ROCURONIUM BROMIDE 50 MG: 10 INJECTION INTRAVENOUS at 12:37

## 2019-01-01 RX ADMIN — POTASSIUM CHLORIDE 20 MEQ: 29.8 INJECTION, SOLUTION INTRAVENOUS at 11:20

## 2019-01-01 RX ADMIN — Medication 10 MEQ: at 07:41

## 2019-01-01 RX ADMIN — CEFTRIAXONE SODIUM 1 G: 1 INJECTION, POWDER, FOR SOLUTION INTRAMUSCULAR; INTRAVENOUS at 15:39

## 2019-01-01 RX ADMIN — Medication 10 MEQ: at 08:55

## 2019-01-01 RX ADMIN — ACETAMINOPHEN 975 MG: 325 TABLET ORAL at 08:10

## 2019-01-01 RX ADMIN — PROPOFOL 50 MG: 10 INJECTION, EMULSION INTRAVENOUS at 13:44

## 2019-01-01 RX ADMIN — Medication 10 MG: at 13:31

## 2019-01-01 RX ADMIN — HYDROCORTISONE SODIUM SUCCINATE 50 MG: 100 INJECTION, POWDER, FOR SOLUTION INTRAMUSCULAR; INTRAVENOUS at 02:14

## 2019-01-01 RX ADMIN — FENTANYL CITRATE 25 MCG: 50 INJECTION, SOLUTION INTRAMUSCULAR; INTRAVENOUS at 13:08

## 2019-01-01 RX ADMIN — LATANOPROST 1 DROP: 50 SOLUTION/ DROPS OPHTHALMIC at 19:50

## 2019-01-01 RX ADMIN — PIPERACILLIN AND TAZOBACTAM 4.5 G: 4; .5 INJECTION, POWDER, FOR SOLUTION INTRAVENOUS at 02:13

## 2019-01-01 RX ADMIN — MULTIPLE VITAMINS W/ MINERALS TAB 1 TABLET: TAB at 20:19

## 2019-01-01 RX ADMIN — PIPERACILLIN AND TAZOBACTAM 4.5 G: 4; .5 INJECTION, POWDER, FOR SOLUTION INTRAVENOUS at 02:58

## 2019-01-01 RX ADMIN — VASOPRESSIN 2.4 UNITS/HR: 20 INJECTION INTRAVENOUS at 23:05

## 2019-01-01 RX ADMIN — SODIUM CHLORIDE: 9 INJECTION, SOLUTION INTRAVENOUS at 21:41

## 2019-01-01 RX ADMIN — HYDROCORTISONE SODIUM SUCCINATE 50 MG: 100 INJECTION, POWDER, FOR SOLUTION INTRAMUSCULAR; INTRAVENOUS at 15:01

## 2019-01-01 RX ADMIN — MIRTAZAPINE 15 MG: 15 TABLET, FILM COATED ORAL at 21:10

## 2019-01-01 RX ADMIN — LATANOPROST 1 DROP: 50 SOLUTION/ DROPS OPHTHALMIC at 20:26

## 2019-01-01 RX ADMIN — PIPERACILLIN AND TAZOBACTAM 4.5 G: 4; .5 INJECTION, POWDER, FOR SOLUTION INTRAVENOUS at 15:04

## 2019-01-01 RX ADMIN — HEPARIN SODIUM 5000 UNITS: 5000 INJECTION, SOLUTION INTRAVENOUS; SUBCUTANEOUS at 06:18

## 2019-01-01 RX ADMIN — SENNOSIDES AND DOCUSATE SODIUM 2 TABLET: 8.6; 5 TABLET ORAL at 09:29

## 2019-01-01 RX ADMIN — POTASSIUM CHLORIDE, DEXTROSE MONOHYDRATE AND SODIUM CHLORIDE: 150; 5; 450 INJECTION, SOLUTION INTRAVENOUS at 00:46

## 2019-01-01 RX ADMIN — CITALOPRAM HYDROBROMIDE 10 MG: 10 TABLET ORAL at 08:32

## 2019-01-01 RX ADMIN — MIRTAZAPINE 15 MG: 15 TABLET, FILM COATED ORAL at 21:01

## 2019-01-01 RX ADMIN — MORPHINE SULFATE 4 MG: 4 INJECTION INTRAVENOUS at 20:18

## 2019-01-01 RX ADMIN — SENNOSIDES AND DOCUSATE SODIUM 2 TABLET: 8.6; 5 TABLET ORAL at 08:10

## 2019-01-01 RX ADMIN — MIRTAZAPINE 15 MG: 15 TABLET, FILM COATED ORAL at 21:05

## 2019-01-01 RX ADMIN — LATANOPROST 1 DROP: 50 SOLUTION/ DROPS OPHTHALMIC at 21:27

## 2019-01-01 RX ADMIN — ACETAMINOPHEN 650 MG: 325 TABLET, FILM COATED ORAL at 17:23

## 2019-01-01 RX ADMIN — HEPARIN SODIUM 5000 UNITS: 5000 INJECTION, SOLUTION INTRAVENOUS; SUBCUTANEOUS at 20:48

## 2019-01-01 RX ADMIN — MULTIPLE VITAMINS W/ MINERALS TAB 1 TABLET: TAB at 22:31

## 2019-01-01 RX ADMIN — ACETAMINOPHEN 975 MG: 325 TABLET ORAL at 17:33

## 2019-01-01 RX ADMIN — HEPARIN SODIUM 5000 UNITS: 5000 INJECTION, SOLUTION INTRAVENOUS; SUBCUTANEOUS at 15:01

## 2019-01-01 RX ADMIN — Medication 10 MEQ: at 10:15

## 2019-01-01 RX ADMIN — HEPARIN SODIUM 5000 UNITS: 5000 INJECTION, SOLUTION INTRAVENOUS; SUBCUTANEOUS at 15:04

## 2019-01-01 RX ADMIN — SENNOSIDES AND DOCUSATE SODIUM 2 TABLET: 8.6; 5 TABLET ORAL at 20:16

## 2019-01-01 RX ADMIN — NOREPINEPHRINE BITARTRATE 0.03 MCG/KG/MIN: 1 INJECTION INTRAVENOUS at 18:20

## 2019-01-01 RX ADMIN — Medication 10 MEQ: at 03:39

## 2019-01-01 RX ADMIN — SODIUM CHLORIDE 1000 ML: 9 INJECTION, SOLUTION INTRAVENOUS at 16:25

## 2019-01-01 RX ADMIN — FLUTICASONE PROPIONATE 1 SPRAY: 50 SPRAY, METERED NASAL at 08:32

## 2019-01-01 RX ADMIN — HEPARIN SODIUM 5000 UNITS: 5000 INJECTION, SOLUTION INTRAVENOUS; SUBCUTANEOUS at 23:21

## 2019-01-01 RX ADMIN — SODIUM CHLORIDE: 9 INJECTION, SOLUTION INTRAVENOUS at 16:01

## 2019-01-01 RX ADMIN — Medication 10 MEQ: at 20:16

## 2019-01-01 RX ADMIN — PIPERACILLIN AND TAZOBACTAM 4.5 G: 4; .5 INJECTION, POWDER, FOR SOLUTION INTRAVENOUS at 14:10

## 2019-01-01 RX ADMIN — SODIUM CHLORIDE, POTASSIUM CHLORIDE, SODIUM LACTATE AND CALCIUM CHLORIDE: 600; 310; 30; 20 INJECTION, SOLUTION INTRAVENOUS at 13:42

## 2019-01-01 RX ADMIN — FENTANYL CITRATE 25 MCG: 50 INJECTION, SOLUTION INTRAMUSCULAR; INTRAVENOUS at 13:46

## 2019-01-01 RX ADMIN — POTASSIUM CHLORIDE 40 MEQ: 1500 TABLET, EXTENDED RELEASE ORAL at 22:31

## 2019-01-01 RX ADMIN — HYDROCORTISONE SODIUM SUCCINATE 50 MG: 100 INJECTION, POWDER, FOR SOLUTION INTRAMUSCULAR; INTRAVENOUS at 20:47

## 2019-01-01 RX ADMIN — Medication 10 MG: at 13:22

## 2019-01-01 RX ADMIN — ENOXAPARIN SODIUM 40 MG: 40 INJECTION SUBCUTANEOUS at 14:15

## 2019-01-01 RX ADMIN — Medication 5 MG: at 12:57

## 2019-01-01 RX ADMIN — PIPERACILLIN AND TAZOBACTAM 4.5 G: 4; .5 INJECTION, POWDER, FOR SOLUTION INTRAVENOUS at 20:47

## 2019-01-01 RX ADMIN — TAMSULOSIN HYDROCHLORIDE 0.4 MG: 0.4 CAPSULE ORAL at 21:15

## 2019-01-01 RX ADMIN — MIRTAZAPINE 15 MG: 15 TABLET, FILM COATED ORAL at 21:24

## 2019-01-01 RX ADMIN — HEPARIN SODIUM 5000 UNITS: 5000 INJECTION, SOLUTION INTRAVENOUS; SUBCUTANEOUS at 06:05

## 2019-01-01 RX ADMIN — PIPERACILLIN SODIUM,TAZOBACTAM SODIUM 3.38 G: 3; .375 INJECTION, POWDER, FOR SOLUTION INTRAVENOUS at 12:48

## 2019-01-01 RX ADMIN — SODIUM CHLORIDE: 9 INJECTION, SOLUTION INTRAVENOUS at 06:06

## 2019-01-01 RX ADMIN — NEOSTIGMINE METHYLSULFATE 3 MG: 1 INJECTION, SOLUTION INTRAVENOUS at 13:41

## 2019-01-01 RX ADMIN — HYDROCORTISONE SODIUM SUCCINATE 50 MG: 100 INJECTION, POWDER, FOR SOLUTION INTRAMUSCULAR; INTRAVENOUS at 10:04

## 2019-01-01 RX ADMIN — CEFAZOLIN 1 G: 1 INJECTION, POWDER, FOR SOLUTION INTRAMUSCULAR; INTRAVENOUS at 20:57

## 2019-01-01 RX ADMIN — VANCOMYCIN HYDROCHLORIDE 1000 MG: 1 INJECTION, SOLUTION INTRAVENOUS at 19:00

## 2019-01-01 RX ADMIN — CHOLECALCIFEROL TAB 50 MCG (2000 UNIT) 2000 UNITS: 50 TAB at 08:32

## 2019-01-01 RX ADMIN — GLYCOPYRROLATE 0.4 MG: 0.2 INJECTION, SOLUTION INTRAMUSCULAR; INTRAVENOUS at 13:41

## 2019-01-01 RX ADMIN — SODIUM CHLORIDE: 9 INJECTION, SOLUTION INTRAVENOUS at 21:03

## 2019-01-01 RX ADMIN — SENNOSIDES AND DOCUSATE SODIUM 1 TABLET: 8.6; 5 TABLET ORAL at 21:24

## 2019-01-01 RX ADMIN — CHOLECALCIFEROL TAB 50 MCG (2000 UNIT) 2000 UNITS: 50 TAB at 08:10

## 2019-01-01 RX ADMIN — SENNOSIDES AND DOCUSATE SODIUM 1 TABLET: 8.6; 5 TABLET ORAL at 08:34

## 2019-01-01 RX ADMIN — TAMSULOSIN HYDROCHLORIDE 0.4 MG: 0.4 CAPSULE ORAL at 19:52

## 2019-01-01 RX ADMIN — HEPARIN SODIUM 5000 UNITS: 5000 INJECTION, SOLUTION INTRAVENOUS; SUBCUTANEOUS at 06:16

## 2019-01-01 ASSESSMENT — ACTIVITIES OF DAILY LIVING (ADL)
COGNITION: 2 - DIFFICULTY WITH ORGANIZING THOUGHTS
ADLS_ACUITY_SCORE: 21
TRANSFERRING: 1-->ASSISTIVE EQUIPMENT
TOILETING: 0-->INDEPENDENT
ADLS_ACUITY_SCORE: 20
ADLS_ACUITY_SCORE: 21
ADLS_ACUITY_SCORE: 21
ADLS_ACUITY_SCORE: 20
SWALLOWING: 0-->SWALLOWS FOODS/LIQUIDS WITHOUT DIFFICULTY
ADLS_ACUITY_SCORE: 20
ADLS_ACUITY_SCORE: 22
ADLS_ACUITY_SCORE: 20
AMBULATION: 1-->ASSISTIVE EQUIPMENT
ADLS_ACUITY_SCORE: 20
ADLS_ACUITY_SCORE: 22
ADLS_ACUITY_SCORE: 21
ADLS_ACUITY_SCORE: 20
ADLS_ACUITY_SCORE: 21
ADLS_ACUITY_SCORE: 13.5
ADLS_ACUITY_SCORE: 13.5
ADLS_ACUITY_SCORE: 21
ADLS_ACUITY_SCORE: 21
NUMBER_OF_TIMES_PATIENT_HAS_FALLEN_WITHIN_LAST_SIX_MONTHS: 1
ADLS_ACUITY_SCORE: 13.5
ADLS_ACUITY_SCORE: 22
ADLS_ACUITY_SCORE: 19
FALL_HISTORY_WITHIN_LAST_SIX_MONTHS: YES
ADLS_ACUITY_SCORE: 13.5
ADLS_ACUITY_SCORE: 22
ADLS_ACUITY_SCORE: 20
ADLS_ACUITY_SCORE: 21
ADLS_ACUITY_SCORE: 20
ADLS_ACUITY_SCORE: 21
ADLS_ACUITY_SCORE: 21
ADLS_ACUITY_SCORE: 20
ADLS_ACUITY_SCORE: 21
ADLS_ACUITY_SCORE: 13
ADLS_ACUITY_SCORE: 21
ADLS_ACUITY_SCORE: 20
ADLS_ACUITY_SCORE: 21
ADLS_ACUITY_SCORE: 20
ADLS_ACUITY_SCORE: 21
BATHING: 2-->ASSISTIVE PERSON
ADLS_ACUITY_SCORE: 19
ADLS_ACUITY_SCORE: 21
ADLS_ACUITY_SCORE: 13
ADLS_ACUITY_SCORE: 22
ADLS_ACUITY_SCORE: 21
RETIRED_COMMUNICATION: 0-->UNDERSTANDS/COMMUNICATES WITHOUT DIFFICULTY
ADLS_ACUITY_SCORE: 22
RETIRED_EATING: 0-->INDEPENDENT
ADLS_ACUITY_SCORE: 21
ADLS_ACUITY_SCORE: 21
ADLS_ACUITY_SCORE: 22
ADLS_ACUITY_SCORE: 20
ADLS_ACUITY_SCORE: 21
ADLS_ACUITY_SCORE: 20
ADLS_ACUITY_SCORE: 20
ADLS_ACUITY_SCORE: 22
DRESS: 0-->INDEPENDENT

## 2019-01-01 ASSESSMENT — LIFESTYLE VARIABLES: TOBACCO_USE: 0

## 2019-01-01 ASSESSMENT — COPD QUESTIONNAIRES: COPD: 0

## 2019-01-01 ASSESSMENT — MIFFLIN-ST. JEOR: SCORE: 1381

## 2019-01-01 ASSESSMENT — ENCOUNTER SYMPTOMS
SEIZURES: 0
ARTHRALGIAS: 1

## 2019-01-24 NOTE — LETTER
1/24/2019        RE: Elie Marcano  [facility]  [address]  [facility] [address]        [facility]    Chief Complaint   Patient presents with     RECHECK       [facility] Medical Record Number:  [id number]  Place of Service where encounter took place:  [facility] [[id number]]    HPI:    Elie Marcano is a 78 year old  (1940), who is being seen today for an episodic care visit.      HPI information obtained from: facility chart records, facility staff, patient report and [facility] chart review.    Today's concern is:  Loss of Weight  Follow-up due to weight loss. Weight stable between 145-150#.  Period a few months ago where resident was not eating well, unable to concentrate long enough to stay at table.   Started on Mirtazapine 7.5 at HS on 11/27/18.  Levothyroxine was also adjusted due to low TSH.  Mood is much improved. Pleasant and cooperative during visits and with staff.  Staff still having issues with showering.  No change in bowel or bladder habits. No nausea or vomiting. No apparent abd pain.  No sweats. No melena or hematochezia reported by staff.   Due to dementia, resident cannot offer meaningful history.    Recent weights reviewed:    Recent weights reviewed.  12/11  150#  11/26   141#  11/19  153#  Wt Readings from Last 2 Encounters:   11/27/18 141 lb 12.8 oz (64.3 kg)   08/02/18 164 lb 12.8 oz (74.8 kg)     Early onset Alzheimer's disease with behavioral disturbance  Resident of [facility] since July 2016 - moved from [place].   UMS on admission 11/30 July 2016. [facility] 7/30 in July 2017.   Nursing assistants report still resisting cares at times. Gets Seroquel prior to showers.  Less paranoid.  Continues on Celexa 10 mg daily - dose increased in June 2018.   Mirtazapine started in November due to low mood, agitation, and low appetite.  Mood and appetite have improved.       Benign essential hypertension  Lisinopril  stopped June 2018 after two ER visits with pre-syncope.   No arrhythmia on ECG.  Syncope listed on problem list from previous PCP in [place], but no specific notes regarding etiology.  Today resident denies chest pain, palpitations, dizziness, headache.    Recent VS reviewed:  BP Readings from Last 3 Encounters:   01/24/19 128/68   12/11/18 152/80   11/27/18 132/77        Postablative hypothyroidism  History of Graves Disease with a multinodular toxic goiter.   Previously treated with methimazole and I-131 therapy in May 2016.   While in [place], was followed by endocrinology, lost to follow-up s/p move to [place].    Previously on levothyroxine 125 mcg, but dose decreased to 110 mcg 06/08/17 due to low TSH (0.71).  On 8/01/2017 Levothyroxine increased to 112 mcg daily due to slightly elevated TSH (5.06).   TSH 11/28 low normal, below goal of 3-5. Decreased levothyroxine dose decreased to 100 mcg PO once daily. Repeat TSH on January 10th was 1.0.    TSH   Date Value Ref Range Status   01/10/2019 1.00 (A) 0.40 - 4.00 mU/L Final   11/28/2018 0.47 0.40 - 4.00 mU/L Final   06/28/2018 1.05 0.40 - 4.00 mU/L Final   02/21/2018 1.32 0.40 - 4.00 mU/L Final   10/31/2017 3.67 0.40 - 4.00 mU/L Final     ALLERGIES: Patient has no known allergies.     Past Medical, Surgical, Family and Social History reviewed and updated in [facility].    Current Outpatient Medications   Medication Sig Dispense Refill     camphor-menthol (SARNA) 0.5-0.5 % LOTN Apply 1 mL topically 2 times daily as needed 222 mL 98     citalopram (CELEXA) 10 MG tablet Take 1 tablet (10 mg) by mouth daily 60 tablet 98     FLUTICASONE PROPIONATE, NASAL, NA Spray 1 spray in nostril daily 1 spray daily. For Rhinitis       latanoprost (XALATAN) 0.005 % ophthalmic solution INSTILL ONE DROP IN EACH EYE EVERY NIGHT AT BEDTIME 2.5 mL 97     levothyroxine (SYNTHROID/LEVOTHROID) 100 MCG tablet TAKE 1 TABLET BY MOUTH ONCE DAILY 31 tablet 98     MAPAP 325 MG tablet TAKE TWO TABLETS (650MG) BY  MOUTH FOUR TIMES A DAY AS NEEDED FOR PAIN 180 tablet 98     mirtazapine (REMERON) 15 MG tablet Take 15 mg by mouth At Bedtime       Multiple Vitamins-Minerals (CEROVITE SENIOR) TABS TAKE 1 TABLET BY MOUTH ONCE DAILY 31 tablet 98     QUEtiapine Fumarate (SEROQUEL PO) Take 25 mg by mouth daily as needed On shower days.        tamsulosin (FLOMAX) 0.4 MG capsule TAKE 1 CAPSULE BY MOUTH ONCE DAILY 31 capsule 98     VITAMIN D3 1000 units tablet TAKE TWO TABLETS (2000 UNITS) BY MOUTH ONCE DAILY 62 tablet 98     Medications reviewed:  Medications reconciled to facility chart and changes were made to reflect current medications as identified as above med list. Below are the changes that were made:   Medications stopped since last EPIC medication reconciliation:   There are no discontinued medications.    Medications started since last [facility] medication reconciliation:  No orders of the defined types were placed in this encounter.    REVIEW OF SYSTEMS:  Unobtainable secondary to cognitive impairment.   As per usual, say he feels fine and nothing is wrong.     Physical Exam:  /68   Pulse 66   Resp 19   Wt 67.1 kg (148 lb)   SpO2 98%   GENERAL APPEARANCE:  Alert, fully dressed, well groomed, resting in bed after lunch.  HEENT: MMM w/o exudate, conjunctiva w/o injection, no drainage.  RESP:  Respiratory effort and palpation of chest normal, lungs clear to auscultation BL  CV:  Palpation and auscultation of heart done, regular rate and rhythm, no murmur, rub, or gallop  PV: no edema, calves are supple and non-tender  GI: scaphoid abdomen, soft, non-tender, non-distended, + BS  M/S:   Gait and station shuffling, ambulating independently w/ 4WW, needs cueing  SKIN:  No visible rashes, lesions or ulcerations. Skin without increased warmth or tenderness.  Neuro: Does not follow for cranial nerve testing, no facial asymmetry, no tremor, normal tone  PSYCH:  memory impaired, oriented to self only, speech fluent, judgment  impaired, cooperative today.    Recent Labs:   CBC RESULTS:   Recent Labs   Lab Test 11/28/18 06/11/18  1138   WBC 5.7 5.3   RBC 4.09* 3.74*   HGB 13.3 12.0*   HCT 38.8* 36.3*   MCV 95 97   MCH 32.5 32.1   MCHC 34.3 33.1   RDW 13.8 14.4    160     Last Basic Metabolic Panel:  Recent Labs   Lab Test 11/28/18 06/28/18    136   POTASSIUM 4.0 3.6   CHLORIDE 101 102   RONALD 8.9 8.3*   CO2 28 27   BUN 12 15   CR 0.82 0.79   GLC 96 91       Liver Function Studies -   Recent Labs   Lab Test 11/28/18 06/06/18  1110   PROTTOTAL 6.9 7.0   ALBUMIN 3.4 3.2*   BILITOTAL 0.6 0.4   ALKPHOS 62 69   AST 17 19   ALT 17 19     TSH   Date Value Ref Range Status   01/10/2019 1.00 (A) 0.40 - 4.00 mU/L Final   11/28/2018 0.47 0.40 - 4.00 mU/L Final     Lab Results   Component Value Date    A1C 5.8 11/28/2018     Assessment/Plan:  (R63.4) Loss of weight  (primary encounter diagnosis)  Comment: Weight stable over last month 145#-150#, eating better  Plan:   - Monitor weights monthly   - Increase Mitrazapine to 15 mg PO once daily at HS to sleeping with depression, agitation and stimulate appetite   - Check BMP on 1/31  - Continue Ensure as provided by health care agent     (G30.0,  F02.81) Early onset Alzheimer's disease with behavioral disturbance  Comment: Progressive cognitive and functional decline over last year with 30# weight loss   Plan:   - Weight stable  - Continues to benefit from supportive care in [facility] Setting   - Continue Celexa and increased Mirtazapine to 15 mg PO once daily due to continue agitation with showering, as well as Seroquel prior to showers.  - Continue non-pharmacological intervention with agitation as per OT recommendations    (I10) Benign essential hypertension  Comment: BP stable off antihypertensives, no recent syncope.  Plan:   - Monitor routine VS    (E89.0) Postablative hypothyroidism  Comment: History of Graves Disease with a multinodular toxic goiter.   Previously treated with  methimazole and I-131 therapy in May 2016.   Last TSH 1 on 1/102/019.  Plan:   - Continue levothyroxine 100 mcg daily - dose reduction 11/28  - Check TSH in July 2019  - HC agent does not want to bring out to specialist unless would add to comfort      Electronically signed by  STAS Ziegler CNP                    Sincerely,        STAS Ziegler CNP

## 2019-01-24 NOTE — PROGRESS NOTES
[facility]    Chief Complaint   Patient presents with     RECHECK       [facility] Medical Record Number:  [id number]  Place of Service where encounter took place:  [facility] [[id number]]    HPI:    Elie Marcano is a 78 year old  (1940), who is being seen today for an episodic care visit.      HPI information obtained from: facility chart records, facility staff, patient report and [facility] chart review.    Today's concern is:  Loss of Weight  Follow-up due to weight loss. Weight stable between 145-150#.  Period a few months ago where resident was not eating well, unable to concentrate long enough to stay at table.   Started on Mirtazapine 7.5 at HS on 11/27/18.  Levothyroxine was also adjusted due to low TSH.  Mood is much improved. Pleasant and cooperative during visits and with staff.  Staff still having issues with showering.  No change in bowel or bladder habits. No nausea or vomiting. No apparent abd pain.  No sweats. No melena or hematochezia reported by staff.   Due to dementia, resident cannot offer meaningful history.    Recent weights reviewed:    Recent weights reviewed.  12/11  150#  11/26   141#  11/19  153#  Wt Readings from Last 2 Encounters:   11/27/18 141 lb 12.8 oz (64.3 kg)   08/02/18 164 lb 12.8 oz (74.8 kg)     Early onset Alzheimer's disease with behavioral disturbance  Resident of [facility] since July 2016 - moved from [place].   SLUMS on admission 11/30 July 2016. SLUMS 7/30 in July 2017.   Nursing assistants report still resisting cares at times. Gets Seroquel prior to showers.  Less paranoid.  Continues on Celexa 10 mg daily - dose increased in June 2018.   Mirtazapine started in November due to low mood, agitation, and low appetite.  Mood and appetite have improved.       Benign essential hypertension  Lisinopril stopped June 2018 after two ER visits with pre-syncope.   No arrhythmia on ECG.  Syncope listed on problem list from  previous PCP in [place], but no specific notes regarding etiology.  Today resident denies chest pain, palpitations, dizziness, headache.    Recent VS reviewed:  BP Readings from Last 3 Encounters:   01/24/19 128/68   12/11/18 152/80   11/27/18 132/77        Postablative hypothyroidism  History of Graves Disease with a multinodular toxic goiter.   Previously treated with methimazole and I-131 therapy in May 2016.   While in [place], was followed by endocrinology, lost to follow-up s/p move to [place].    Previously on levothyroxine 125 mcg, but dose decreased to 110 mcg 06/08/17 due to low TSH (0.71).  On 8/01/2017 Levothyroxine increased to 112 mcg daily due to slightly elevated TSH (5.06).   TSH 11/28 low normal, below goal of 3-5. Decreased levothyroxine dose decreased to 100 mcg PO once daily. Repeat TSH on January 10th was 1.0.    TSH   Date Value Ref Range Status   01/10/2019 1.00 (A) 0.40 - 4.00 mU/L Final   11/28/2018 0.47 0.40 - 4.00 mU/L Final   06/28/2018 1.05 0.40 - 4.00 mU/L Final   02/21/2018 1.32 0.40 - 4.00 mU/L Final   10/31/2017 3.67 0.40 - 4.00 mU/L Final     ALLERGIES: Patient has no known allergies.     Past Medical, Surgical, Family and Social History reviewed and updated in [facility].    Current Outpatient Medications   Medication Sig Dispense Refill     camphor-menthol (SARNA) 0.5-0.5 % LOTN Apply 1 mL topically 2 times daily as needed 222 mL 98     citalopram (CELEXA) 10 MG tablet Take 1 tablet (10 mg) by mouth daily 60 tablet 98     FLUTICASONE PROPIONATE, NASAL, NA Spray 1 spray in nostril daily 1 spray daily. For Rhinitis       latanoprost (XALATAN) 0.005 % ophthalmic solution INSTILL ONE DROP IN EACH EYE EVERY NIGHT AT BEDTIME 2.5 mL 97     levothyroxine (SYNTHROID/LEVOTHROID) 100 MCG tablet TAKE 1 TABLET BY MOUTH ONCE DAILY 31 tablet 98     MAPAP 325 MG tablet TAKE TWO TABLETS (650MG) BY MOUTH FOUR TIMES A DAY AS NEEDED FOR PAIN 180 tablet 98     mirtazapine (REMERON) 15 MG tablet Take 15 mg by mouth  At Bedtime       Multiple Vitamins-Minerals (CEROVITE SENIOR) TABS TAKE 1 TABLET BY MOUTH ONCE DAILY 31 tablet 98     QUEtiapine Fumarate (SEROQUEL PO) Take 25 mg by mouth daily as needed On shower days.        tamsulosin (FLOMAX) 0.4 MG capsule TAKE 1 CAPSULE BY MOUTH ONCE DAILY 31 capsule 98     VITAMIN D3 1000 units tablet TAKE TWO TABLETS (2000 UNITS) BY MOUTH ONCE DAILY 62 tablet 98     Medications reviewed:  Medications reconciled to facility chart and changes were made to reflect current medications as identified as above med list. Below are the changes that were made:   Medications stopped since last EPIC medication reconciliation:   There are no discontinued medications.    Medications started since last [facility] medication reconciliation:  No orders of the defined types were placed in this encounter.    REVIEW OF SYSTEMS:  Unobtainable secondary to cognitive impairment.   As per usual, say he feels fine and nothing is wrong.     Physical Exam:  /68   Pulse 66   Resp 19   Wt 67.1 kg (148 lb)   SpO2 98%   GENERAL APPEARANCE:  Alert, fully dressed, well groomed, resting in bed after lunch.  HEENT: MMM w/o exudate, conjunctiva w/o injection, no drainage.  RESP:  Respiratory effort and palpation of chest normal, lungs clear to auscultation BL  CV:  Palpation and auscultation of heart done, regular rate and rhythm, no murmur, rub, or gallop  PV: no edema, calves are supple and non-tender  GI: scaphoid abdomen, soft, non-tender, non-distended, + BS  M/S:   Gait and station shuffling, ambulating independently w/ 4WW, needs cueing  SKIN:  No visible rashes, lesions or ulcerations. Skin without increased warmth or tenderness.  Neuro: Does not follow for cranial nerve testing, no facial asymmetry, no tremor, normal tone  PSYCH:  memory impaired, oriented to self only, speech fluent, judgment impaired, cooperative today.    Recent Labs:   CBC RESULTS:   Recent Labs   Lab Test 11/28/18 06/11/18  1138   WBC 5.7  5.3   RBC 4.09* 3.74*   HGB 13.3 12.0*   HCT 38.8* 36.3*   MCV 95 97   MCH 32.5 32.1   MCHC 34.3 33.1   RDW 13.8 14.4    160     Last Basic Metabolic Panel:  Recent Labs   Lab Test 11/28/18 06/28/18    136   POTASSIUM 4.0 3.6   CHLORIDE 101 102   RONALD 8.9 8.3*   CO2 28 27   BUN 12 15   CR 0.82 0.79   GLC 96 91       Liver Function Studies -   Recent Labs   Lab Test 11/28/18 06/06/18  1110   PROTTOTAL 6.9 7.0   ALBUMIN 3.4 3.2*   BILITOTAL 0.6 0.4   ALKPHOS 62 69   AST 17 19   ALT 17 19     TSH   Date Value Ref Range Status   01/10/2019 1.00 (A) 0.40 - 4.00 mU/L Final   11/28/2018 0.47 0.40 - 4.00 mU/L Final     Lab Results   Component Value Date    A1C 5.8 11/28/2018     Assessment/Plan:  (R63.4) Loss of weight  (primary encounter diagnosis)  Comment: Weight stable over last month 145#-150#, eating better  Plan:   - Monitor weights monthly   - Increase Mitrazapine to 15 mg PO once daily at HS to sleeping with depression, agitation and stimulate appetite   - Check BMP on 1/31  - Continue Ensure as provided by health care agent     (G30.0,  F02.81) Early onset Alzheimer's disease with behavioral disturbance  Comment: Progressive cognitive and functional decline over last year with 30# weight loss   Plan:   - Weight stable  - Continues to benefit from supportive care in [facility]   - Continue Celexa and increased Mirtazapine to 15 mg PO once daily due to continue agitation with showering, as well as Seroquel prior to showers.  - Continue non-pharmacological intervention with agitation as per OT recommendations    (I10) Benign essential hypertension  Comment: BP stable off antihypertensives, no recent syncope.  Plan:   - Monitor routine VS    (E89.0) Postablative hypothyroidism  Comment: History of Graves Disease with a multinodular toxic goiter.   Previously treated with methimazole and I-131 therapy in May 2016.   Last TSH 1 on 1/102/019.  Plan:   - Continue levothyroxine 100 mcg daily - dose  reduction 11/28  - Check TSH in July 2019  - HC agent does not want to bring out to specialist unless would add to comfort      Electronically signed by  STAS Ziegler CNP

## 2019-01-24 NOTE — PROGRESS NOTES
"[facility]    Chief Complaint   Patient presents with     AMANDACOSME       [facility] Record Number:  [id number]  Place of Service where encounter took place[facility] [[id number]]    HPI:    Elie Marcano is a 78 year old  (1940), who is being seen today for an episodic care visit.      HPI information obtained from: facility chart records, facility staff, patient report and [facility] chart review.    Today's concern is:  Loss of Weight  \"He is doing so much better!\"   Weight is trending up.  Recently not eating much, unable to concentrate long enough to stay at table.   Staff were also concerned about mood after hospitalization of his friend and tablemate, who has since returned from hospital.  Started on Mirtazapine at HS on 11/27/18.  Levothyroxine was also adjusted.  No change in bowel or bladder habits. No nausea or vomiting. No apparent abd pain.  No sweats. No melena or hematochezia reported by staff.   Due to dementia, resident cannot offer meaningful history.  Health care agent was going to bring Ensure, staff unclear if she brought it in yet.    Recent weights reviewed.  12/11  150#  11/26   141#  11/19  153#  Wt Readings from Last 5 Encounters:   01/24/19 67.1 kg (148 lb)   12/11/18 68 kg (150 lb)   11/27/18 64.3 kg (141 lb 12.8 oz)   08/02/18 74.8 kg (164 lb 12.8 oz)   06/11/18 77.1 kg (170 lb)       Early onset Alzheimer's disease with behavioral disturbance  Resident of [facility] since July 2016 - moved from [place].   UMS on admission 11/30 July 2016. [facility] 7/30 in July 2017.   Difficulty allowing for assistance w/ showering and ADLs.   Paranoid affect today.  Strikes out at staff on occasion.   Gets Seroquel prior to showers.   Staff were able to shower resident today, was agitated, but only verbal, one staff member able to assist with dressing.  Continues on Celexa 10 mg daily - dose increased in June 2018.   Mirtazapine started " two weeks ago due to low mood, agitation, and low appetite.  Mood and appetite have improved. Calm/cooperative today, less paranoid, makes jokes, follows simple direction.     Benign essential hypertension  Lisinopril stopped June 2018 after two ER visits with pre-syncope. No arrhythmia on ECG.  Syncope listed on problem list from previous PCP in [place], but no specific notes regarding etiology.  Today resident denies chest pain, palpitations, dizziness, headache.  Recent VS reviewed:  BP Readings from Last 3 Encounters:   01/24/19 128/68   12/11/18 152/80   11/27/18 132/77        Postablative hypothyroidism  History of Graves Disease with a multinodular toxic goiter.   Previously treated with methimazole and I-131 therapy in May 2016.   While in [place], was followed by endocrinology, lost to follow-up s/p move to [place].  Previously on levothyroxine 125 mcg, but dose decreased to 110 mcg 06/08/17 due to low TSH (0.71).   On 8/01/2017 Levothyroxine increased to 112 mcg daily due to slightly elevated TSH (5.06).   TSH 11/28 low normal, below goal of 3-5. Decreased levothyroxine dose 100 mcg PO once daily. Repeat TSH and free T4 on January 10 th.    TSH   Date Value Ref Range Status   01/10/2019 1.00 (A) 0.40 - 4.00 mU/L Final   11/28/2018 0.47 0.40 - 4.00 mU/L Final   06/28/2018 1.05 0.40 - 4.00 mU/L Final   02/21/2018 1.32 0.40 - 4.00 mU/L Final   10/31/2017 3.67 0.40 - 4.00 mU/L Final     Advance Care Planning  DNR/DNI.  Health care agent has wanted to focus on comfort and quality of life given advanced dementia.   Would like to consider hospice given progression of dementia over last year and low QoL.     ALLERGIES: Patient has no known allergies.     Past Medical, Surgical, Family and Social History reviewed and updated in [facility].    Current Outpatient Medications   Medication Sig Dispense Refill     camphor-menthol (SARNA) 0.5-0.5 % LOTN Apply 1 mL topically 2 times daily as needed 222 mL 98     citalopram (CELEXA) 10 MG  tablet Take 1 tablet (10 mg) by mouth daily 60 tablet 98     FLUTICASONE PROPIONATE, NASAL, NA Spray 1 spray in nostril daily 1 spray daily. For Rhinitis       latanoprost (XALATAN) 0.005 % ophthalmic solution INSTILL ONE DROP IN EACH EYE EVERY NIGHT AT BEDTIME 2.5 mL 97     levothyroxine (SYNTHROID/LEVOTHROID) 100 MCG tablet TAKE 1 TABLET BY MOUTH ONCE DAILY 31 tablet 98     MAPAP 325 MG tablet TAKE TWO TABLETS (650MG) BY MOUTH FOUR TIMES A DAY AS NEEDED FOR PAIN 180 tablet 98     mirtazapine (REMERON) 15 MG tablet Take 15 mg by mouth At Bedtime       Multiple Vitamins-Minerals (CEROVITE SENIOR) TABS TAKE 1 TABLET BY MOUTH ONCE DAILY 31 tablet 98     QUEtiapine Fumarate (SEROQUEL PO) Take 25 mg by mouth daily as needed On shower days.        tamsulosin (FLOMAX) 0.4 MG capsule TAKE 1 CAPSULE BY MOUTH ONCE DAILY 31 capsule 98     VITAMIN D3 1000 units tablet TAKE TWO TABLETS (2000 UNITS) BY MOUTH ONCE DAILY 62 tablet 98     Medications reviewed:  Medications reconciled to facility chart and changes were made to reflect current medications as identified as above med list. Below are the changes that were made:   Medications stopped since last EPIC medication reconciliation:   There are no discontinued medications.    Medications started since last [facility] medication reconciliation:  No orders of the defined types were placed in this encounter.    REVIEW OF SYSTEMS:  Unobtainable secondary to cognitive impairment.   As per usual, say he feels fine and nothing is wrong.     Physical Exam:  /68   Pulse 66   Resp 19   Wt 67.1 kg (148 lb)   SpO2 98%   GENERAL APPEARANCE:  Alert, fully dressed, well groomed, sitting in activity room with group of residents.  HEENT: MMM w/o exudate, conjunctiva w/o injection, no drainage.  RESP:  Respiratory effort and palpation of chest normal, lungs clear to auscultation , no respiratory distress  CV:  Palpation and auscultation of heart done, regular rate and rhythm, no murmur, rub,  or gallop  PV: no edema, calves are supple and non-tender  GI: scaphoid abdomen, soft, non-tender, non-distended, + BS  M/S:   Gait and station shuffling, ambulating independently w/ 4WW, needs step by step cueing to use walker  SKIN:  No visible rashes, lesions or ulcerations. Skin without increased warmth or tenderness.  Neuro: Does not follow for cranial nerve testing, no facial asymmetry, no tremor, normal tone  PSYCH:  memory impaired, oriented to self only, speech fluent, judgment impaired, cooperative today.    Recent Labs:   CBC RESULTS:   Recent Labs   Lab Test 11/28/18 06/11/18  1138   WBC 5.7 5.3   RBC 4.09* 3.74*   HGB 13.3 12.0*   HCT 38.8* 36.3*   MCV 95 97   MCH 32.5 32.1   MCHC 34.3 33.1   RDW 13.8 14.4    160       Last Basic Metabolic Panel:  Recent Labs   Lab Test 11/28/18 06/28/18    136   POTASSIUM 4.0 3.6   CHLORIDE 101 102   RONALD 8.9 8.3*   CO2 28 27   BUN 12 15   CR 0.82 0.79   GLC 96 91       Liver Function Studies -   Recent Labs   Lab Test 11/28/18 06/06/18  1110   PROTTOTAL 6.9 7.0   ALBUMIN 3.4 3.2*   BILITOTAL 0.6 0.4   ALKPHOS 62 69   AST 17 19   ALT 17 19     TSH   Date Value Ref Range Status   01/10/2019 1.00 (A) 0.40 - 4.00 mU/L Final   11/28/2018 0.47 0.40 - 4.00 mU/L Final     Lab Results   Component Value Date    A1C 5.8 11/28/2018     Assessment/Plan:  (R63.4) Loss of weight  (primary encounter diagnosis)  Comment: Eating more, weight is trending up after addition of Mirtazapine and dose reduction of levothyroxine.  Plan:   - Monitor weights  - Shayla to bring Ensure and favorite foods  - Continue Mitrazapine 7.5 mg PO once daily at HS, may help with agitation and stimulate appetite     (G30.0,  F02.81) Early onset Alzheimer's disease with behavioral disturbance  Comment: Progressive cognitive and functional decline over last year.  Plan:   - Weight is trending up, will follow-up on 01/10/19, if weight loss and decline were to continue will offer hospice  - Continues to  benefit from supportive care in [facility]   - Continue Celexa and Mirtazapine, as well as Seroquel prior to showers. If agitation persists despite medication changes, consider scheduled Seroquel.  - Continue non-pharmacological intervention with agitation as per OT recommendations    (I10) Benign essential hypertension  Comment: BP slightly higher than 150 today off antihypertensives, no recent syncope, would not add medication at this time, repeat BP in one month at next follow-up.  Plan:   - Monitor routine VS    (E89.0) Postablative hypothyroidism  Comment: History of Graves Disease with a multinodular toxic goiter.   Previously treated with methimazole and I-131 therapy in May 2016.   Last TSH 0.47 on 11/28, levothyroxine dose lowered, lost to endocrine follow-up.  Plan:   - Continue levothyroxine 100 mcg daily - dose reduction 11/28  - Check TSH and free T4 on Jan 10th  - HC agent does not want to bring out to specialist unless would add to comfort    (Z71.89) Advance care planning  Comment: Progressive dementia, DNR/DNI, with recent weight loss, does not want TF, plan of care is comfort focus.   Plan:   - Called Shayla Parish (Friend/[facility]) no answer message left with my direct cell phone number requesting return call to discuss plan of care.    Electronically signed by  Doris Mir MA

## 2019-03-14 NOTE — LETTER
3/14/2019        RE: Elie Marcano  [facility]  [address]  [facility] [address]        [place] [facility]  [facility] [facility] Record Number:  [id number]  Place of Service where encounter took place:  [facility] [[id number]]  Chief Complaint   Patient presents with     RECHECK     Routine       HPI:    Elie Marcano  is a 78 year old (1940) male with Alzheimer's Dementia, BPH, HTN, hx of syncope, thyroid disease, vitamin D deficiency who is being seen today for an episodic care visit.      HPI information obtained from: facility chart records, facility staff, patient report and [facility] chart review.     Resident of [facility] since July 2016 when he moved from [place] where he was living independently, but need more assistance due to progressive cognitive decline. His health care agent is a friend, Shayla, who lives locally. Recently experienced pre-syncope in June 2018 resulting in two ER visits, his lisinopril was stopped and there have been no recurrent episodes. Then experienced some weight loss in setting of progressive cognitive impairment, which has since stabilized with addition of mirtazapine, dose reduction of thyroid supplement, and starting Ensure supplment.    Follow-up due to weight loss, dementia, and hypertension,    Today's concern is:  Loss of Weight  Weight stable between 145-150#. Down from 164# in August 2018.   Period a few months ago where resident was not eating well, unable to concentrate long enough to stay at table.   Started on Mirtazapine 7.5 at HS on 11/27/18, dose increased to 15 mg in February.   Levothyroxine was also adjusted due to low TSH.  No change in bowel or bladder habits. No nausea or vomiting. No apparent abd pain.  No sweats. No melena or hematochezia reported by staff.   Due to dementia, resident cannot offer meaningful history.    Recent weights reviewed:  Wt Readings from Last 5 Encounters:  "  03/14/19 67.6 kg (149 lb)   01/24/19 67.1 kg (148 lb)   12/11/18 68 kg (150 lb)   11/27/18 64.3 kg (141 lb 12.8 oz)   08/02/18 74.8 kg (164 lb 12.8 oz)       Early onset Alzheimer's disease with behavioral disturbance  SLUMS on admission 11/30 July 2016. SLUMS 7/30 in July 2017.   Nursing assistants report still resisting cares at times.   Mood is much improved, but somewhat irritable today. Tells me \"you'll just ruin that too!\" but does follow commands and tolerates exam.  Gets Seroquel prior to showers.  Less paranoid.  Continues on Celexa 10 mg daily - dose increased in June 2018.   Mirtazapine started in November due to low mood, agitation, and low appetite, now on 15 mg q HS.    Benign essential hypertension  Lisinopril stopped June 2018 after two ER visits with pre-syncope.   No arrhythmia on ECG.  Syncope listed on problem list from previous PCP in [place], but no specific notes regarding etiology.  Today resident denies chest pain, palpitations, dizziness, headache.    Recent VS reviewed:  BP Readings from Last 3 Encounters:   03/14/19 114/72   01/24/19 128/68   12/11/18 152/80     Postablative hypothyroidism  History of Graves Disease with a multinodular toxic goiter.   Previously treated with methimazole and I-131 therapy in May 2016.   While in [place], was followed by endocrinology, lost to follow-up s/p move to [place].    Previously on levothyroxine 125 mcg, but dose decreased to 110 mcg 06/08/17 due to low TSH (0.71).    On 8/01/2017 Levothyroxine increased to 112 mcg daily due to slightly elevated TSH (5.06).   TSH 0.47 11/28 low normal, below goal of 3-5. Decreased levothyroxine dose decreased to 100 mcg PO once daily.   TSH   Date Value Ref Range Status   01/10/2019 1.00 (A) 0.40 - 4.00 mU/L Final   11/28/2018 0.47 0.40 - 4.00 mU/L Final   06/28/2018 1.05 0.40 - 4.00 mU/L Final   02/21/2018 1.32 0.40 - 4.00 mU/L Final   10/31/2017 3.67 0.40 - 4.00 mU/L Final     Past Medical and Surgical History reviewed " "in Epic today.    MEDICATIONS:  Current Outpatient Medications   Medication Sig Dispense Refill     camphor-menthol (DERMASARRA) 0.5-0.5 % external lotion Apply topically 2 times daily as needed for skin care Apply a thin layer to rash on chest       citalopram (CELEXA) 10 MG tablet Take 1 tablet (10 mg) by mouth daily 60 tablet 98     fluticasone (FLONASE) 50 MCG/ACT nasal spray INHALE 1 SPRAY IN EACH NOSTRIL DAILY 16 g 98     latanoprost (XALATAN) 0.005 % ophthalmic solution INSTILL ONE DROP IN EACH EYE EVERY NIGHT AT BEDTIME 2.5 mL 97     levothyroxine (SYNTHROID/LEVOTHROID) 100 MCG tablet TAKE 1 TABLET BY MOUTH ONCE DAILY 31 tablet 98     MAPAP 325 MG tablet TAKE TWO TABLETS (650MG) BY MOUTH FOUR TIMES A DAY AS NEEDED FOR PAIN 180 tablet 98     mirtazapine (REMERON) 15 MG tablet Take 15 mg by mouth At Bedtime       Multiple Vitamins-Minerals (CEROVITE SENIOR) TABS TAKE 1 TABLET BY MOUTH ONCE DAILY 31 tablet 98     QUEtiapine Fumarate (SEROQUEL PO) Take 25 mg by mouth daily as needed On shower days.        tamsulosin (FLOMAX) 0.4 MG capsule TAKE 1 CAPSULE BY MOUTH ONCE DAILY 31 capsule 98     VITAMIN D3 1000 units tablet TAKE TWO TABLETS (2000 UNITS) BY MOUTH ONCE DAILY 62 tablet 98       REVIEW OF SYSTEMS:  Limited secondary to cognitive impairment but today pt reports, \"I'm in fine!\"     Objective:  /72   Pulse 75   Temp 98.6  F (37  C)   Resp 20   Wt 67.6 kg (149 lb)   SpO2 98%   Exam:  GENERAL APPEARANCE:  Alert, fully dressed, well groomed, in room after lunch.  HEENT: conjunctiva w/o injection, no drainage.  RESP:  Respiratory effort and palpation of chest normal, lungs clear to auscultation BL  CV:  Palpation and auscultation of heart done, regular rate and rhythm, no murmur, rub, or gallop  PV: no edema, calves are supple and non-tender  GI: flat abdomen, soft, non-tender, non-distended, + BS  M/S:   Gait and station shuffling, ambulating independently w/ 4WW, needs cueing, stands t/o exam " today, good balance  SKIN:  No visible rashes, lesions or ulcerations. Skin without increased warmth or tenderness.  Neuro: Does not follow for cranial nerve testing, no facial asymmetry, no tremor, normal tone  PSYCH:  memory impaired, oriented to self only, speech fluent, judgment impaired, cooperative today.    Labs:   Recent labs in [facility] reviewed by me today.    CBC RESULTS:   Recent Labs   Lab Test 11/28/18 06/11/18  1138   WBC 5.7 5.3   RBC 4.09* 3.74*   HGB 13.3 12.0*   HCT 38.8* 36.3*   MCV 95 97   MCH 32.5 32.1   MCHC 34.3 33.1   RDW 13.8 14.4    160       Last Basic Metabolic Panel:  Recent Labs   Lab Test 01/31/19 11/28/18    135   POTASSIUM 4.0 4.0   CHLORIDE 99 101   RONALD 8.9 8.9   CO2 29 28   BUN 11 12   CR 0.75 0.82   GLC 97 96       Liver Function Studies -   Recent Labs   Lab Test 11/28/18 06/06/18  1110   PROTTOTAL 6.9 7.0   ALBUMIN 3.4 3.2*   BILITOTAL 0.6 0.4   ALKPHOS 62 69   AST 17 19   ALT 17 19     TSH   Date Value Ref Range Status   01/10/2019 1.00 (A) 0.40 - 4.00 mU/L Final   11/28/2018 0.47 0.40 - 4.00 mU/L Final     Lab Results   Component Value Date    A1C 5.8 11/28/2018     ASSESSMENT/PLAN:  (R63.4) Loss of weight  (primary encounter diagnosis)  Comment: Weight stable over last month 145#-150#, eating better  Plan:   - Monitor weights monthly   - Continue Mitrazapine to 15 mg PO once daily at HS to target depression, agitation and stimulate appetite   - Continue Ensure as provided by health care agent   - Continue MVI    (G30.0,  F02.81) Early onset Alzheimer's disease with behavioral disturbance  Comment: Progressive cognitive and functional decline over last year   Plan:   - Continues to benefit from supportive care in [facility]   - Continue Celexa and Mirtazapine, as well as Seroquel prior to showers to target agitation, paranoia, and irritability  - Continue non-pharmacological intervention with agitation as per OT recommendations  - Focus on comfort and  QoL, [facility] would consider hospice if downturn    (I10) Benign essential hypertension  Comment: BP stable off antihypertensives, no recent syncope.  Plan:   - Monitor routine VS and labs q 6 mo to 1 yr    (E89.0) Postablative hypothyroidism  Comment: History of Graves Disease with a multinodular toxic goiter.   Previously treated with methimazole and I-131 therapy in May 2016.   Last TSH 1 on 1/102/019.  Plan:   - Continue levothyroxine 100 mcg daily - dose reduction 11/28  - Check TSH in July 2019  - HC agent does not want to bring out to specialist unless would add to comfort    Electronically signed by:  STAS Ziegler CNP             Sincerely,        STAS Ziegler CNP

## 2019-03-14 NOTE — PROGRESS NOTES
[facility]  [place] [facility] Record Number:  [id number]  Place of Service where encounter took place:  [facility] [[id number]]  Chief Complaint   Patient presents with     RECHECK     Routine       HPI:    Elie Marcano  is a 78 year old (1940) male with Alzheimer's Dementia, BPH, HTN, hx of syncope, thyroid disease, vitamin D deficiency who is being seen today for an episodic care visit.      HPI information obtained from: facility chart records, facility staff, patient report and [facility] chart review.     Resident of [facility] since July 2016 when he moved from [place] where he was living independently, but need more assistance due to progressive cognitive decline. His health care agent is a friend, Shayla, who lives locally. Recently experienced pre-syncope in June 2018 resulting in two ER visits, his lisinopril was stopped and there have been no recurrent episodes. Then experienced some weight loss in setting of progressive cognitive impairment, which has since stabilized with addition of mirtazapine, dose reduction of thyroid supplement, and starting Ensure supplment.    Follow-up due to weight loss, dementia, and hypertension,    Today's concern is:  Loss of Weight  Weight stable between 145-150#. Down from 164# in August 2018.   Period a few months ago where resident was not eating well, unable to concentrate long enough to stay at table.   Started on Mirtazapine 7.5 at HS on 11/27/18, dose increased to 15 mg in February.   Levothyroxine was also adjusted due to low TSH.  No change in bowel or bladder habits. No nausea or vomiting. No apparent abd pain.  No sweats. No melena or hematochezia reported by staff.   Due to dementia, resident cannot offer meaningful history.    Recent weights reviewed:  Wt Readings from Last 5 Encounters:   03/14/19 67.6 kg (149 lb)   01/24/19 67.1 kg (148 lb)   12/11/18 68 kg (150 lb)   11/27/18 64.3 kg (141 lb 12.8 oz)  "  08/02/18 74.8 kg (164 lb 12.8 oz)       Early onset Alzheimer's disease with behavioral disturbance  SLUMS on admission 11/30 July 2016. SLUMS 7/30 in July 2017.   Nursing assistants report still resisting cares at times.   Mood is much improved, but somewhat irritable today. Tells me \"you'll just ruin that too!\" but does follow commands and tolerates exam.  Gets Seroquel prior to showers.  Less paranoid.  Continues on Celexa 10 mg daily - dose increased in June 2018.   Mirtazapine started in November due to low mood, agitation, and low appetite, now on 15 mg q HS.    Benign essential hypertension  Lisinopril stopped June 2018 after two ER visits with pre-syncope.   No arrhythmia on ECG.  Syncope listed on problem list from previous PCP in [place], but no specific notes regarding etiology.  Today resident denies chest pain, palpitations, dizziness, headache.    Recent VS reviewed:  BP Readings from Last 3 Encounters:   03/14/19 114/72   01/24/19 128/68   12/11/18 152/80     Postablative hypothyroidism  History of Graves Disease with a multinodular toxic goiter.   Previously treated with methimazole and I-131 therapy in May 2016.   While in [place], was followed by endocrinology, lost to follow-up s/p move to [place].    Previously on levothyroxine 125 mcg, but dose decreased to 110 mcg 06/08/17 due to low TSH (0.71).    On 8/01/2017 Levothyroxine increased to 112 mcg daily due to slightly elevated TSH (5.06).   TSH 0.47 11/28 low normal, below goal of 3-5. Decreased levothyroxine dose decreased to 100 mcg PO once daily.   TSH   Date Value Ref Range Status   01/10/2019 1.00 (A) 0.40 - 4.00 mU/L Final   11/28/2018 0.47 0.40 - 4.00 mU/L Final   06/28/2018 1.05 0.40 - 4.00 mU/L Final   02/21/2018 1.32 0.40 - 4.00 mU/L Final   10/31/2017 3.67 0.40 - 4.00 mU/L Final     Past Medical and Surgical History reviewed in Epic today.    MEDICATIONS:  Current Outpatient Medications   Medication Sig Dispense Refill     camphor-menthol " "(DERMASARRA) 0.5-0.5 % external lotion Apply topically 2 times daily as needed for skin care Apply a thin layer to rash on chest       citalopram (CELEXA) 10 MG tablet Take 1 tablet (10 mg) by mouth daily 60 tablet 98     fluticasone (FLONASE) 50 MCG/ACT nasal spray INHALE 1 SPRAY IN EACH NOSTRIL DAILY 16 g 98     latanoprost (XALATAN) 0.005 % ophthalmic solution INSTILL ONE DROP IN EACH EYE EVERY NIGHT AT BEDTIME 2.5 mL 97     levothyroxine (SYNTHROID/LEVOTHROID) 100 MCG tablet TAKE 1 TABLET BY MOUTH ONCE DAILY 31 tablet 98     MAPAP 325 MG tablet TAKE TWO TABLETS (650MG) BY MOUTH FOUR TIMES A DAY AS NEEDED FOR PAIN 180 tablet 98     mirtazapine (REMERON) 15 MG tablet Take 15 mg by mouth At Bedtime       Multiple Vitamins-Minerals (CEROVITE SENIOR) TABS TAKE 1 TABLET BY MOUTH ONCE DAILY 31 tablet 98     QUEtiapine Fumarate (SEROQUEL PO) Take 25 mg by mouth daily as needed On shower days.        tamsulosin (FLOMAX) 0.4 MG capsule TAKE 1 CAPSULE BY MOUTH ONCE DAILY 31 capsule 98     VITAMIN D3 1000 units tablet TAKE TWO TABLETS (2000 UNITS) BY MOUTH ONCE DAILY 62 tablet 98       REVIEW OF SYSTEMS:  Limited secondary to cognitive impairment but today pt reports, \"I'm in fine!\"     Objective:  /72   Pulse 75   Temp 98.6  F (37  C)   Resp 20   Wt 67.6 kg (149 lb)   SpO2 98%   Exam:  GENERAL APPEARANCE:  Alert, fully dressed, well groomed, in room after lunch.  HEENT: conjunctiva w/o injection, no drainage.  RESP:  Respiratory effort and palpation of chest normal, lungs clear to auscultation BL  CV:  Palpation and auscultation of heart done, regular rate and rhythm, no murmur, rub, or gallop  PV: no edema, calves are supple and non-tender  GI: flat abdomen, soft, non-tender, non-distended, + BS  M/S:   Gait and station shuffling, ambulating independently w/ 4WW, needs cueing, stands t/o exam today, good balance  SKIN:  No visible rashes, lesions or ulcerations. Skin without increased warmth or " tenderness.  Neuro: Does not follow for cranial nerve testing, no facial asymmetry, no tremor, normal tone  PSYCH:  memory impaired, oriented to self only, speech fluent, judgment impaired, cooperative today.    Labs:   Recent labs in [facility] reviewed by me today.    CBC RESULTS:   Recent Labs   Lab Test 11/28/18 06/11/18  1138   WBC 5.7 5.3   RBC 4.09* 3.74*   HGB 13.3 12.0*   HCT 38.8* 36.3*   MCV 95 97   MCH 32.5 32.1   MCHC 34.3 33.1   RDW 13.8 14.4    160       Last Basic Metabolic Panel:  Recent Labs   Lab Test 01/31/19 11/28/18    135   POTASSIUM 4.0 4.0   CHLORIDE 99 101   RONALD 8.9 8.9   CO2 29 28   BUN 11 12   CR 0.75 0.82   GLC 97 96       Liver Function Studies -   Recent Labs   Lab Test 11/28/18 06/06/18  1110   PROTTOTAL 6.9 7.0   ALBUMIN 3.4 3.2*   BILITOTAL 0.6 0.4   ALKPHOS 62 69   AST 17 19   ALT 17 19     TSH   Date Value Ref Range Status   01/10/2019 1.00 (A) 0.40 - 4.00 mU/L Final   11/28/2018 0.47 0.40 - 4.00 mU/L Final     Lab Results   Component Value Date    A1C 5.8 11/28/2018     ASSESSMENT/PLAN:  (R63.4) Loss of weight  (primary encounter diagnosis)  Comment: Weight stable over last month 145#-150#, eating better  Plan:   - Monitor weights monthly   - Continue Mitrazapine to 15 mg PO once daily at HS to target depression, agitation and stimulate appetite   - Continue Ensure as provided by health care agent   - Continue MVI    (G30.0,  F02.81) Early onset Alzheimer's disease with behavioral disturbance  Comment: Progressive cognitive and functional decline over last year   Plan:   - Continues to benefit from supportive care in [facility] Memory Care Setting   - Continue Celexa and Mirtazapine, as well as Seroquel prior to showers to target agitation, paranoia, and irritability  - Continue non-pharmacological intervention with agitation as per OT recommendations  - Focus on comfort and QoL, [facility] would consider hospice if downturn    (I10) Benign essential hypertension  Comment: BP stable off  antihypertensives, no recent syncope.  Plan:   - Monitor routine VS and labs q 6 mo to 1 yr    (E89.0) Postablative hypothyroidism  Comment: History of Graves Disease with a multinodular toxic goiter.   Previously treated with methimazole and I-131 therapy in May 2016.   Last TSH 1 on 1/102/019.  Plan:   - Continue levothyroxine 100 mcg daily - dose reduction 11/28  - Check TSH in July 2019  - HC agent does not want to bring out to specialist unless would add to comfort    Electronically signed by:  STAS Ziegler CNP

## 2019-04-11 NOTE — PROGRESS NOTES
Late entry. [facility] chart reviewed, member goals achieved, and member's condition as stabilized.  Will close to case management. CC will continue to follow as needed.  Saloni Tineo RN  [facility] Coordinator  [phone]

## 2019-05-21 NOTE — LETTER
"    5/21/2019        RE: Elie Marcano  [facility]  [address]  [facility] [address]        [facility]  [place] [facility] Record Number:  [id number]  Place of Service where encounter took place:  [facility] [[id number]]  Chief Complaint   Patient presents with     RECHECK     Routine       HPI:    Elie Marcano  is a 78 year old (1940) male with PMH significant for Alzheimer's Dementia, BPH, HTN, hx of syncope, thyroid disease, vitamin D deficiency, who is being seen today for an episodic care visit.      HPI information obtained from: facility chart records, facility staff, patient report and [facility] chart review.     Resident of [facility] since July 2016 when he moved from [place] where he was living independently, but needed more assistance due to progressive cognitive decline. His health care agent is a friend, Shayla, who lives locally. Recently experienced pre-syncope in June 2018 resulting in two ER visits, his lisinopril was stopped and there have been no recurrent episodes. Then experienced some weight loss in setting of progressive cognitive impairment but then stabilized with addition of mirtazapine and dose reduction of thyroid supplement.     Today's concern is:  Follow-up today for multiple medical issues including dementia and hypothyroidism.  Nurse reports some anxiety, asks \"what I'm supposed to be doing?\" Weight is down 5# over last two months 143# >> 138#.  Showering okay. Nursing assistant reports \"he hasn't been eating much\" - hard to cue him. Unable to offer much meaningful history, talks about his father today, \"my dad always said, 'what do I have to complain about?'\"  No pain. No SOB. No change in bladder or bowel habits reported by staff. Ambulates with 4 WW with shuffling gait, no recent falls.     Past Medical and Surgical History reviewed in Epic today.    MEDICATIONS:  Current Outpatient Medications "   Medication Sig Dispense Refill     camphor-menthol (DERMASARRA) 0.5-0.5 % external lotion Apply topically 2 times daily as needed for skin care Apply a thin layer to rash on chest       citalopram (CELEXA) 10 MG tablet Take 1 tablet (10 mg) by mouth daily 60 tablet 98     fluticasone (FLONASE) 50 MCG/ACT nasal spray INHALE 1 SPRAY IN EACH NOSTRIL DAILY 16 g 98     latanoprost (XALATAN) 0.005 % ophthalmic solution INSTILL ONE DROP IN EACH EYE EVERY NIGHT AT BEDTIME 2.5 mL 97     levothyroxine (SYNTHROID/LEVOTHROID) 100 MCG tablet TAKE 1 TABLET BY MOUTH ONCE DAILY 31 tablet 98     MAPAP 325 MG tablet TAKE TWO TABLETS (650MG) BY MOUTH FOUR TIMES A DAY AS NEEDED FOR PAIN 180 tablet 98     mirtazapine (REMERON) 15 MG tablet Take 15 mg by mouth At Bedtime       Multiple Vitamins-Minerals (CEROVITE SENIOR) TABS TAKE 1 TABLET BY MOUTH ONCE DAILY 31 tablet 98     Nutritional Supplements (ENSURE PO) Take 8 oz by mouth daily       QUEtiapine Fumarate (SEROQUEL PO) Take 25 mg by mouth daily as needed On shower days.        tamsulosin (FLOMAX) 0.4 MG capsule TAKE 1 CAPSULE BY MOUTH ONCE DAILY 31 capsule 98     VITAMIN D3 1000 units tablet TAKE TWO TABLETS (2000 UNITS) BY MOUTH ONCE DAILY 62 tablet 98     Recent weights and vitals reviewed in Epic:  Wt Readings from Last 5 Encounters:   05/21/19 62.6 kg (138 lb)   03/14/19 67.6 kg (149 lb)   01/24/19 67.1 kg (148 lb)   12/11/18 68 kg (150 lb)   11/27/18 64.3 kg (141 lb 12.8 oz)     BP Readings from Last 3 Encounters:   05/21/19 144/76   03/14/19 114/72   01/24/19 128/68     Pulse Readings from Last 4 Encounters:   05/21/19 53   03/14/19 75   01/24/19 66   12/11/18 64     REVIEW OF SYSTEMS:  Unobtainable secondary to cognitive impairment.     Objective:  /76   Pulse 53   Temp 98.4  F (36.9  C)   Resp 20   Wt 62.6 kg (138 lb)   SpO2 99%   Exam:  GENERAL APPEARANCE:  Alert, fully dressed, well groomed, thin, stands for whole of exam.  HEENT: conjunctiva w/o injection,  no drainage.  RESP:  Respiratory effort and palpation of chest normal, lungs clear to auscultation BL  CV:  Palpation and auscultation of heart done, regular rate and rhythm, no murmur, rub, or gallop  PV: no edema, calves are supple and non-tender  GI: Scaphoid abdomen, soft, non-tender, non-distended, + BS.  M/S:   Gait and station shuffling, ambulating independently w/ 4WW, needs cueing, stands t/o exam today, good balance  SKIN:  No visible rashes, lesions or ulcerations. Skin without increased warmth or tenderness.  Neuro: Does not follow for cranial nerve testing, no facial asymmetry, no tremor, normal tone  PSYCH:  memory impaired, oriented to self only, speech fluent, judgment impaired, cooperative today.    Labs:   Recent labs in [facility] reviewed by me today.   CBC RESULTS:   Recent Labs   Lab Test 11/28/18 06/11/18  1138   WBC 5.7 5.3   RBC 4.09* 3.74*   HGB 13.3 12.0*   HCT 38.8* 36.3*   MCV 95 97   MCH 32.5 32.1   MCHC 34.3 33.1   RDW 13.8 14.4    160       Last Basic Metabolic Panel:  Recent Labs   Lab Test 01/31/19 11/28/18    135   POTASSIUM 4.0 4.0   CHLORIDE 99 101   RONALD 8.9 8.9   CO2 29 28   BUN 11 12   CR 0.75 0.82   GLC 97 96       Liver Function Studies -   Recent Labs   Lab Test 11/28/18 06/06/18  1110   PROTTOTAL 6.9 7.0   ALBUMIN 3.4 3.2*   BILITOTAL 0.6 0.4   ALKPHOS 62 69   AST 17 19   ALT 17 19     TSH   Date Value Ref Range Status   01/10/2019 1.00 (A) 0.40 - 4.00 mU/L Final   11/28/2018 0.47 0.40 - 4.00 mU/L Final     Lab Results   Component Value Date    A1C 5.8 11/28/2018     ASSESSMENT/PLAN:  (G30.0,  F02.81) Early onset Alzheimer's disease with behavioral disturbance  (primary encounter diagnosis)  Comment: Progressive cognitive and functional decline over last year now with 10# weight loss over last six months.  Plan:   - Continues to benefit from supportive care in [facility]   - Continue Celexa and Mirtazapine, as well as Seroquel prior to showers to target  agitation, paranoia, and irritability  - Continue non-pharmacological intervention with agitation as per OT recommendations  - Focus on comfort and QoL, [facility] would consider hospice if downturn. Will call to discuss referral given weight loss.    (I10) Benign essential hypertension  Comment: BP stable off antihypertensives, no recent syncope.  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 Previously treated with methimazole and I-131 therapy in May 2016.   Last TSH 1 on 1/10/2019  Plan:   - Continue levothyroxine 100 mcg daily - dose reduction 11/28/18  - Check TSH in July 2019  - HC agent does not want to bring out to specialist unless would add to comfort.  - Check TSH given weight loss    (R63.4) Loss of weight   Comment: Weight down again from 148# >>138#, difficult to get resident to stay at tablet to eat.   Plan:   - Monitor weights weekly x4   - Continue Mitrazapine to 15 mg PO once daily at HS to target depression, agitation and stimulate appetite   - Has not been getting Ensure - message sent to [facility] case managed to see if we can get covered by insurance  - Continue MVI  - Consult SLP to assess for dysphagia and appropriateness of food texture.     Orders written by provider at facility    Electronically signed by:  STAS Ziegler CNP    Addendum 05/24/19: Called Shayla Mitchell and left message requesting return about weight loss and plan of care.        Documentation of Face-to-Face and Certification for Home Health Services     Patient: Elie Marcano   YOB: 1940  MR Number: [id number]  Today's Date: 5/24/2019    I certify that patient: Elie Maracno is under my care and that I, or a nurse practitioner or physician's assistant working with me, had a face-to-face encounter that meets the physician face-to-face encounter requirements with this patient on: 5/21/19.    This encounter with  the patient was in whole, or in part, for the following medical condition, which is the primary reason for home health care:    Early onset Alzheimer's disease with behavioral disturbance  Loss of weight.    I certify that, based on my findings, the following services are medically necessary home health services: Speech Language Therapy.    My clinical findings support the need for the above services because: Speech Therapy Services are needed to assess and treat impairments in language and/or swallow functions due to progression of dementia with weight loss..    Further, I certify that my clinical findings support that this patient is homebound (i.e. absences from home require considerable and taxing effort and are for medical reasons or [employer] services or infrequently or of short duration when for other reasons) because: Requires assistance of another person or specialized equipment to access medical services because patient: Is prone to wander/get lost without assistance...    Based on the above findings. I certify that this patient is confined to the home and needs intermittent skilled nursing care, physical therapy and/or speech therapy.  The patient is under my care, and I have initiated the establishment of the plan of care.  This patient will be followed by a physician who will periodically review the plan of care.  Physician/Provider to provide follow up care: Debbie Plata    Responsible Medicare certified [place] Physician: Dr. Sharri Dennis MD  Physician Signature: See electronic signature associated with these discharge orders.  Date: 5/24/2019          Sincerely,        STAS Ziegler CNP

## 2019-05-21 NOTE — PROGRESS NOTES
"[facility]  [facility] Record Number:  [id number]  Place of Service where encounter took place:  [facility] [[id number]]  Chief Complaint   Patient presents with     RECHECK     Routine       HPI:    Elie Marcano  is a 78 year old (1940) male with PMH significant for Alzheimer's Dementia, BPH, HTN, hx of syncope, thyroid disease, vitamin D deficiency, who is being seen today for an episodic care visit.      HPI information obtained from: facility chart records, facility staff, patient report and [facility] chart review.     Resident of [facility] since July 2016 when he moved from [place] where he was living independently, but needed more assistance due to progressive cognitive decline. His health care agent is a friend, Shayla, who lives locally. Recently experienced pre-syncope in June 2018 resulting in two ER visits, his lisinopril was stopped and there have been no recurrent episodes. Then experienced some weight loss in setting of progressive cognitive impairment but then stabilized with addition of mirtazapine and dose reduction of thyroid supplement.     Today's concern is:  Follow-up today for multiple medical issues including dementia and hypothyroidism.  Nurse reports some anxiety, asks \"what I'm supposed to be doing?\" Weight is down 5# over last two months 143# >> 138#.  Showering okay. Nursing assistant reports \"he hasn't been eating much\" - hard to cue him. Unable to offer much meaningful history, talks about his father today, \"my dad always said, 'what do I have to complain about?'\"  No pain. No SOB. No change in bladder or bowel habits reported by staff. Ambulates with 4 WW with shuffling gait, no recent falls.     Past Medical and Surgical History reviewed in Epic today.    MEDICATIONS:  Current Outpatient Medications   Medication Sig Dispense Refill     camphor-menthol (DERMASARRA) 0.5-0.5 % external lotion Apply topically 2 times " daily as needed for skin care Apply a thin layer to rash on chest       citalopram (CELEXA) 10 MG tablet Take 1 tablet (10 mg) by mouth daily 60 tablet 98     fluticasone (FLONASE) 50 MCG/ACT nasal spray INHALE 1 SPRAY IN EACH NOSTRIL DAILY 16 g 98     latanoprost (XALATAN) 0.005 % ophthalmic solution INSTILL ONE DROP IN EACH EYE EVERY NIGHT AT BEDTIME 2.5 mL 97     levothyroxine (SYNTHROID/LEVOTHROID) 100 MCG tablet TAKE 1 TABLET BY MOUTH ONCE DAILY 31 tablet 98     MAPAP 325 MG tablet TAKE TWO TABLETS (650MG) BY MOUTH FOUR TIMES A DAY AS NEEDED FOR PAIN 180 tablet 98     mirtazapine (REMERON) 15 MG tablet Take 15 mg by mouth At Bedtime       Multiple Vitamins-Minerals (CEROVITE SENIOR) TABS TAKE 1 TABLET BY MOUTH ONCE DAILY 31 tablet 98     Nutritional Supplements (ENSURE PO) Take 8 oz by mouth daily       QUEtiapine Fumarate (SEROQUEL PO) Take 25 mg by mouth daily as needed On shower days.        tamsulosin (FLOMAX) 0.4 MG capsule TAKE 1 CAPSULE BY MOUTH ONCE DAILY 31 capsule 98     VITAMIN D3 1000 units tablet TAKE TWO TABLETS (2000 UNITS) BY MOUTH ONCE DAILY 62 tablet 98     Recent weights and vitals reviewed in Epic:  Wt Readings from Last 5 Encounters:   05/21/19 62.6 kg (138 lb)   03/14/19 67.6 kg (149 lb)   01/24/19 67.1 kg (148 lb)   12/11/18 68 kg (150 lb)   11/27/18 64.3 kg (141 lb 12.8 oz)     BP Readings from Last 3 Encounters:   05/21/19 144/76   03/14/19 114/72   01/24/19 128/68     Pulse Readings from Last 4 Encounters:   05/21/19 53   03/14/19 75   01/24/19 66   12/11/18 64     REVIEW OF SYSTEMS:  Unobtainable secondary to cognitive impairment.     Objective:  /76   Pulse 53   Temp 98.4  F (36.9  C)   Resp 20   Wt 62.6 kg (138 lb)   SpO2 99%   Exam:  GENERAL APPEARANCE:  Alert, fully dressed, well groomed, thin, stands for whole of exam.  HEENT: conjunctiva w/o injection, no drainage.  RESP:  Respiratory effort and palpation of chest normal, lungs clear to auscultation BL  CV:  Palpation  and auscultation of heart done, regular rate and rhythm, no murmur, rub, or gallop  PV: no edema, calves are supple and non-tender  GI: Scaphoid abdomen, soft, non-tender, non-distended, + BS.  M/S:   Gait and station shuffling, ambulating independently w/ 4WW, needs cueing, stands t/o exam today, good balance  SKIN:  No visible rashes, lesions or ulcerations. Skin without increased warmth or tenderness.  Neuro: Does not follow for cranial nerve testing, no facial asymmetry, no tremor, normal tone  PSYCH:  memory impaired, oriented to self only, speech fluent, judgment impaired, cooperative today.    Labs:   Recent labs in [facility] reviewed by me today.   CBC RESULTS:   Recent Labs   Lab Test 11/28/18 06/11/18  1138   WBC 5.7 5.3   RBC 4.09* 3.74*   HGB 13.3 12.0*   HCT 38.8* 36.3*   MCV 95 97   MCH 32.5 32.1   MCHC 34.3 33.1   RDW 13.8 14.4    160       Last Basic Metabolic Panel:  Recent Labs   Lab Test 01/31/19 11/28/18    135   POTASSIUM 4.0 4.0   CHLORIDE 99 101   RONALD 8.9 8.9   CO2 29 28   BUN 11 12   CR 0.75 0.82   GLC 97 96       Liver Function Studies -   Recent Labs   Lab Test 11/28/18 06/06/18  1110   PROTTOTAL 6.9 7.0   ALBUMIN 3.4 3.2*   BILITOTAL 0.6 0.4   ALKPHOS 62 69   AST 17 19   ALT 17 19     TSH   Date Value Ref Range Status   01/10/2019 1.00 (A) 0.40 - 4.00 mU/L Final   11/28/2018 0.47 0.40 - 4.00 mU/L Final     Lab Results   Component Value Date    A1C 5.8 11/28/2018     ASSESSMENT/PLAN:  (G30.0,  F02.81) Early onset Alzheimer's disease with behavioral disturbance  (primary encounter diagnosis)  Comment: Progressive cognitive and functional decline over last year now with 10# weight loss over last six months.  Plan:   - Continues to benefit from supportive care in [facility] Setting   - Continue Celexa and Mirtazapine, as well as Seroquel prior to showers to target agitation, paranoia, and irritability  - Continue non-pharmacological intervention with agitation as per OT  recommendations  - Focus on comfort and QoL, [facility] would consider hospice if downturn. Will call to discuss referral given weight loss.    (I10) Benign essential hypertension  Comment: BP stable off antihypertensives, no recent syncope.  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 Previously treated with methimazole and I-131 therapy in May 2016.   Last TSH 1 on 1/10/2019  Plan:   - Continue levothyroxine 100 mcg daily - dose reduction 11/28/18  - Check TSH in July 2019  - HC agent does not want to bring out to specialist unless would add to comfort.  - Check TSH given weight loss    (R63.4) Loss of weight   Comment: Weight down again from 148# >>138#, difficult to get resident to stay at tablet to eat.   Plan:   - Monitor weights weekly x4   - Continue Mitrazapine to 15 mg PO once daily at HS to target depression, agitation and stimulate appetite   - Has not been getting Ensure - message sent to [facility] case managed to see if we can get covered by insurance  - Continue MVI  - Consult SLP to assess for dysphagia and appropriateness of food texture.     Orders written by provider at facility    Electronically signed by:  STAS Ziegler CNP    Addendum 05/24/19: Called Shayla Mitchell and left message requesting return about weight loss and plan of care.        Documentation of Face-to-Face and Certification for Home Health Services     Patient: Elie Marcano   YOB: 1940  MR Number: [id number]  Today's Date: 5/24/2019    I certify that patient: Elie Marcano is under my care and that I, or a nurse practitioner or physician's assistant working with me, had a face-to-face encounter that meets the physician face-to-face encounter requirements with this patient on: 5/21/19.    This encounter with the patient was in whole, or in part, for the following medical condition, which is the primary reason for  home health care:    Early onset Alzheimer's disease with behavioral disturbance  Loss of weight.    I certify that, based on my findings, the following services are medically necessary home health services: Speech Language Therapy.    My clinical findings support the need for the above services because: Speech Therapy Services are needed to assess and treat impairments in language and/or swallow functions due to progression of dementia with weight loss..    Further, I certify that my clinical findings support that this patient is homebound (i.e. absences from home require considerable and taxing effort and are for medical reasons or [employer] services or infrequently or of short duration when for other reasons) because: Requires assistance of another person or specialized equipment to access medical services because patient: Is prone to wander/get lost without assistance...    Based on the above findings. I certify that this patient is confined to the home and needs intermittent skilled nursing care, physical therapy and/or speech therapy.  The patient is under my care, and I have initiated the establishment of the plan of care.  This patient will be followed by a physician who will periodically review the plan of care.  Physician/Provider to provide follow up care: Debbie Plata    Responsible Medicare certified [place] Physician: Dr. Sharri Dennis MD  Physician Signature: See electronic signature associated with these discharge orders.  Date: 5/24/2019

## 2019-05-30 NOTE — PROGRESS NOTES
5-30-19   CC was reviewing Epic message and found that NP had reach out to CC as well asking about coverage for Ensure for member due to weight loss. CC did call Debbie KING back at [phone] with similar information left with RN at the AL and asked if there were any additional question to please reach out.     Chart review done and member has had no trigger events to open to CM at this time so CC will f/u as needed.    Cordelia Calvillo MA [facility] Care Coordinator   [phone]          5-28-19   [employer] Medicare chart review and CC did receive email from support staff who received a call from EDEN Camacho at the AL asking about Ensure.      Did check with [employer] company and Ensure would be a private pay option for member and they provided pricing for this if needed.    CC did leave VM for Janice  [phone] at the AL in regards to this.     Cordelia Calvillo MA [facility] Care Coordinator   [phone]

## 2019-05-30 NOTE — PROGRESS NOTES
[employer] Medicare enrollee with change of CM as of 4-1-19      Cordelia Calvillo MA [facility] Coordinator   [phone]

## 2019-06-04 NOTE — PROGRESS NOTES
CC received call again from facility in regards to Ensure being covered under member current insurance.    CC did call Areli back at [phone] and told her it is not a covered item and that if family or they have more questions they could reach out to CC as needed.   Cordelia Calvillo MA [facility] Coordinator   [phone]

## 2019-06-04 NOTE — TELEPHONE ENCOUNTER
LATE ENTRY:  Spoke to health care agent friend, Shayla, on 5/31/19. SLP did not pick patient up, no dysphagia. Discussed weight loss and progression of dementia with little interest in eating. She will bring Ensure supplement drink on Thursday when she next visits to see if resident will take. Interested in hospice services if patient qualifies as care goal is comfort and would not want feeding tube. Staff will continue to encourage resident and provide preferred foods, offer assistance as needed.

## 2019-07-23 NOTE — LETTER
"    7/23/2019        RE: Elie Marcano  [facility]la [facility]  [address]  [facility] [address]        [facility]  Chief Complaint   Patient presents with     Annual Visit     [facility] Medical Record Number:  [id number]  Place of Service where encounter took place:  [facility]) [[id number]]    HPI:    Elie Marcano  is a 78 year old  (1940) male with PMH significant for Alzheimer's Dementia, BPH, HTN, hx of syncope, thyroid disease, vitamin D deficiency, who is being seen today for an annual comprehensive visit.     HPI information obtained from: facility chart records, facility staff, patient report, [facility] chart review and family/first contact Shayla Parish (friend/HCA) report.       Resident of [facility] since July 2016 when he moved from [place] where he was living independently, but needed more assistance due to progressive cognitive decline. His health care agent is a friend, Shayla, who lives locally. Recently experienced pre-syncope in June 2018 resulting in two ER visits, his lisinopril was stopped and there have been no recurrent episodes. Hypothroidism with hx of Graves with multinodular toxic goiter previously treated with methimazole in May 2016, now managed with levothyroxine, last dose reduction Nov 2018, last TSH WNL in January 2019. Chronic sinusitis managed with Flonase. Hx of BPH managed with Flomax.Then experienced some weight loss (15# over six months) in setting of progressive cognitive impairment but then stabilized with addition of mirtazapine and dose reduction of thyroid supplement. Met with hospice in June, but did not meet criteria.     Today's concerns are:   Limited history from resident due to cognitive impairment. Reports, \"I fine!\" No trouble breathing or chest pain. No abd pain. No nausea. Staff reports he attends all meals, but only eats small amounts. Taking ensure supplement daily. Weight has been stable between " ~130-135#. Incontinent of urine at times, this seems to occur between 2-3 pm and will sometimes refuse assist with pericare. Ambulates with rolling walker. Fall 7/21, walking in hallway around midnight, started walking too fast and lost balance. Seems falls was witnessed by staff and he did not hit his head, but has a small abrasion to his forehead. No pain in joints or back.     ALLERGIES: Patient has no known allergies.     PAST MEDICAL HISTORY:  has a past medical history of Alzheimer disease, BPH (benign prostatic hyperplasia), Chronic sinusitis, Cognitive impairment, Elevated PSA, Hyperlipidemia, Hypertension, Syncope, Thyroid disease, Vitamin D deficiency, and Weight loss.     PAST SURGICAL HISTORY:  has a past surgical history that includes appendectomy.     IMMUNIZATIONS:  Immunization History   Administered Date(s) Administered     FLU 6-35 months 10/18/2012     Flu-nasal, Unspecified 11/30/2013     Influenza (High Dose) 3 valent vaccine 09/14/2014, 09/24/2015, 09/29/2016, 09/28/2017, 09/27/2018     Influenza (IIV3) PF 12/03/2010, 09/22/2011, 10/18/2012, 09/20/2013     Influenza Intranasal Vaccine 09/20/2013     Influenza Vaccine IM 3yrs+ 4 Valent IIV4 10/09/2012     Influenza Vaccine, 3 YRS +, IM (QUADRIVALENT W/PRESERVATIVES) 09/14/2014     Pneumo Conj 13-V (2010&after) 09/24/2015     Pneumococcal 23 valent 12/09/2013     TDAP Vaccine (Adacel) 01/01/2013     Tdap (Adacel,Boostrix) 01/01/2013     Yellow Fever 11/04/2003     Zoster vaccine, live 12/09/2013     Above immunizations pulled from [facility]. [facility] and facility records also reconciled. Outstanding information sent to  to update [facility].  Future immunizations are not needed at this point as all recommended immunizations are up to date.     Recent weights and vitals reviewed in Epic:  Wt Readings from Last 5 Encounters:   07/23/19 59.9 kg (132 lb)   05/21/19 62.6 kg (138 lb)   03/14/19 67.6 kg (149 lb)   01/24/19 67.1 kg  (148 lb)   12/11/18 68 kg (150 lb)     BP Readings from Last 3 Encounters:   07/23/19 155/77   05/21/19 144/76   03/14/19 114/72     Pulse Readings from Last 4 Encounters:   07/23/19 54   05/21/19 53   03/14/19 75   01/24/19 66       Current Outpatient Medications   Medication Sig Dispense Refill     camphor-menthol (DERMASARRA) 0.5-0.5 % external lotion Apply topically 2 times daily as needed for skin care Apply a thin layer to rash on chest       citalopram (CELEXA) 10 MG tablet Take 1 tablet (10 mg) by mouth daily 60 tablet 98     fluticasone (FLONASE) 50 MCG/ACT nasal spray INHALE 1 SPRAY IN EACH NOSTRIL DAILY 16 g 98     latanoprost (XALATAN) 0.005 % ophthalmic solution INSTILL ONE DROP IN EACH EYE EVERY NIGHT AT BEDTIME 2.5 mL 97     levothyroxine (SYNTHROID/LEVOTHROID) 100 MCG tablet TAKE 1 TABLET BY MOUTH ONCE DAILY 31 tablet 98     MAPAP 325 MG tablet TAKE TWO TABLETS (650MG) BY MOUTH FOUR TIMES A DAY AS NEEDED FOR PAIN 180 tablet 98     mirtazapine (REMERON) 15 MG tablet Take 15 mg by mouth At Bedtime       Multiple Vitamins-Minerals (CEROVITE SENIOR) TABS TAKE 1 TABLET BY MOUTH ONCE DAILY 31 tablet 98     Nutritional Supplements (ENSURE PO) Take 8 oz by mouth daily       QUEtiapine Fumarate (SEROQUEL PO) Take 25 mg by mouth daily as needed On shower days.        tamsulosin (FLOMAX) 0.4 MG capsule TAKE 1 CAPSULE BY MOUTH ONCE DAILY 31 capsule 98     VITAMIN D3 1000 units tablet TAKE TWO TABLETS (2000 UNITS) BY MOUTH ONCE DAILY 62 tablet 98       Case Management:  I have reviewed the Assisted Living care plan, current immunizations and preventive care/cancer screening. .Future cancer screening is not clinically indicated secondary to age/goals of care Patient's desire to return to the community is not assessible due to cognitive impairment. Current Level of Care is appropriate.    Advance Directive Discussion:    I reviewed the current advanced directives as reflected in EPIC, the POLST and the facility  "chart, and verified the congruency of orders. I contacted the first party, friend Shayla, and discussed the plan of Care.  I did not due to cognitive impairment review the advance directives with the resident.     Team Discussion:  I communicated with the appropriate disciplines involved with the Plan of Care:   Nursing    Patient's goal is unobtainable secondary to cognitive impairment and family's/responsible party's goal for the patient is comfort and QoL.  Information reviewed:  Medications, vital signs, orders, and nursing notes.    ROS:  Limited secondary to cognitive impairment but today pt reports, \"I'm fine!\"     Vitals:  /77   Pulse 54   Temp 98.4  F (36.9  C)   Resp 20   Wt 59.9 kg (132 lb)  There is no height or weight on file to calculate BMI.  Exam:  GENERAL APPEARANCE:  Alert, fully dressed, clean, unshaven, thin.  HEENT: conjunctiva w/o injection, no drainage, about 2 cm x 1 cm superficial scabbed abrasion to right lateral forehead, non-tender.  RESP:  Respiratory effort and palpation of chest normal, lungs clear to auscultation BL.  CV:  Palpation and auscultation of heart done, regular rate and rhythm, no murmur, rub, or gallop  PV: No edema, calves are supple and non-tender.  GI: Scaphoid abdomen, soft, non-tender, non-distended, + BS.  M/S:   Gait and station shuffling, ambulating independently w/ 4WW, needs cueing to walk from activity room to apartment.  SKIN:  No visible rashes, lesions or ulcerations. Skin without increased warmth or tenderness.  Neuro: Does not follow for cranial nerve testing, no facial asymmetry, no tremor, normal tone  PSYCH:  memory impaired, oriented to self only, speech fluent, judgment impaired, cooperative today.    Lab/Diagnostic data:   Recent labs in [facility] reviewed by me today.   CBC RESULTS:   Recent Labs   Lab Test 05/22/19 11/28/18   WBC 4.6 5.7   RBC 4.13* 4.09*   HGB 13.2* 13.3   HCT 39.5* 38.8*   MCV 96 95   MCH 32.0 32.5   MCHC 33.4 34.3   RDW 14.4 " 13.8    175       Last Basic Metabolic Panel:  Recent Labs   Lab Test 05/22/19 01/31/19    134   POTASSIUM 3.6 4.0   CHLORIDE 102 99   RONALD 9.0 8.9   CO2 30 29   BUN 15 11   CR 0.82 0.75   GLC 91 97     GFR Estimate   Date Value Ref Range Status   05/22/2019 83 >60 ml/min/1.73m2 Final       Liver Function Studies -   Recent Labs   Lab Test 05/22/19 11/28/18   PROTTOTAL 7.0 6.9   ALBUMIN 3.5 3.4   BILITOTAL 0.6 0.6   ALKPHOS 70 62   AST 15 17   ALT 17 17       TSH   Date Value Ref Range Status   01/10/2019 1.00 (A) 0.40 - 4.00 mU/L Final   11/28/2018 0.47 0.40 - 4.00 mU/L Final     Lab Results   Component Value Date    A1C 5.8 11/28/2018       ASSESSMENT/PLAN  (G30.0,  F02.81) Early onset Alzheimer's disease with behavioral disturbance  (primary encounter diagnosis)  Comment: Progressive cognitive and functional decline over last year now with 15# weight loss over last six months. Weight stable 130-135# over last three months.  Plan:   - Continues to [facility]   - Continue Celexa and Mirtazapine, as well as Seroquel prior to showers to target agitation, paranoia, and irritability  - Continue non-pharmacological intervention with agitation as per OT recommendations  - Focus on comfort and QoL, [facility] would consider hospice if further downturn.    (I10) Benign essential hypertension  Comment: BP elevated today off antihypertensives, ACE-I stopped due to syncopal episodes.  BP goals are <150/90 mm Hg.This is higher than ACC and AHA recommendations due to goals of care, risk for hypotension, risk of dizziness and falls and frailty.  Plan:   -  Noted patients BP is higher than goal and will adjust plan of care by having nursing repeat BP and HR next week.   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 Previously treated with methimazole and I-131 therapy in May 2016.   Last TSH 1  on 1/10/2019  Plan:   - Continue levothyroxine 100 mcg daily - dose reduction 11/28/18  - Check TSH with next routine labs in Nov 2019  - HC agent does not want to bring out to specialist unless would add to comfort.    (R63.4) Loss of weight   Comment: Weight down again from 148# >>132#, difficult to get resident to stay at tablet to eat. Taking supplement.  Plan:   - Monitor weights  - Continue Mitrazapine to 15 mg PO once daily at HS to target depression, agitation and stimulate appetite   - Continue MVI    (J31.0) Rhinitis  Comment: No complaint today  Plan:   - Continue Flonase     (H40.003) Glaucoma   Comment: No reported change in vision   Plan:   - Continue Latanoprost   - On site ophthalmology follow-up    (N40.0) BPH  Comment: No symptoms   Plan:   - Continue Flomax     Orders written by provider at facility.    Spoke with Shayla via telephone, aware of progressive of decline. Not recognizing friends now. Confirms goal of care is comfort focus. Will continue to keep Shayla updated on clinical status, encouraged to call with any questions/concerns.      Electronically signed by:  STAS Ziegler CNP      Sincerely,        STSA Ziegler CNP

## 2019-07-23 NOTE — PROGRESS NOTES
"[facility]  Chief Complaint   Patient presents with     Annual Visit     [facility] Medical Record Number:  [id number]  Place of Service where encounter took place:  [facility] [[id number]]    HPI:    Elie Marcano  is a 78 year old  (1940) male with PMH significant for Alzheimer's Dementia, BPH, HTN, hx of syncope, thyroid disease, vitamin D deficiency, who is being seen today for an annual comprehensive visit.     HPI information obtained from: facility chart records, facility staff, patient report, [facility] chart review and family/first contact Shayla Parish (friend/HCA) report.       Resident of [facility] since July 2016 when he moved from [place] where he was living independently, but needed more assistance due to progressive cognitive decline. His health care agent is a friend, Shayla, who lives locally. Recently experienced pre-syncope in June 2018 resulting in two ER visits, his lisinopril was stopped and there have been no recurrent episodes. Hypothroidism with hx of Graves with multinodular toxic goiter previously treated with methimazole in May 2016, now managed with levothyroxine, last dose reduction Nov 2018, last TSH WNL in January 2019. Chronic sinusitis managed with Flonase. Hx of BPH managed with Flomax.Then experienced some weight loss (15# over six months) in setting of progressive cognitive impairment but then stabilized with addition of mirtazapine and dose reduction of thyroid supplement. Met with hospice in June, but did not meet criteria.     Today's concerns are:   Limited history from resident due to cognitive impairment. Reports, \"I fine!\" No trouble breathing or chest pain. No abd pain. No nausea. Staff reports he attends all meals, but only eats small amounts. Taking ensure supplement daily. Weight has been stable between ~130-135#. Incontinent of urine at times, this seems to occur between 2-3 pm and will sometimes refuse assist with " pericare. Ambulates with rolling walker. Fall 7/21, walking in hallway around midnight, started walking too fast and lost balance. Seems falls was witnessed by staff and he did not hit his head, but has a small abrasion to his forehead. No pain in joints or back.     ALLERGIES: Patient has no known allergies.     PAST MEDICAL HISTORY:  has a past medical history of Alzheimer disease, BPH (benign prostatic hyperplasia), Chronic sinusitis, Cognitive impairment, Elevated PSA, Hyperlipidemia, Hypertension, Syncope, Thyroid disease, Vitamin D deficiency, and Weight loss.     PAST SURGICAL HISTORY:  has a past surgical history that includes appendectomy.     IMMUNIZATIONS:  Immunization History   Administered Date(s) Administered     FLU 6-35 months 10/18/2012     Flu-nasal, Unspecified 11/30/2013     Influenza (High Dose) 3 valent vaccine 09/14/2014, 09/24/2015, 09/29/2016, 09/28/2017, 09/27/2018     Influenza (IIV3) PF 12/03/2010, 09/22/2011, 10/18/2012, 09/20/2013     Influenza Intranasal Vaccine 09/20/2013     Influenza Vaccine IM 3yrs+ 4 Valent IIV4 10/09/2012     Influenza Vaccine, 3 YRS +, IM (QUADRIVALENT W/PRESERVATIVES) 09/14/2014     Pneumo Conj 13-V (2010&after) 09/24/2015     Pneumococcal 23 valent 12/09/2013     TDAP Vaccine (Adacel) 01/01/2013     Tdap (Adacel,Boostrix) 01/01/2013     Yellow Fever 11/04/2003     Zoster vaccine, live 12/09/2013     Above immunizations pulled from [facility]. [facility] and facility records also reconciled. Outstanding information sent to  to update [facility].  Future immunizations are not needed at this point as all recommended immunizations are up to date.     Recent weights and vitals reviewed in Epic:  Wt Readings from Last 5 Encounters:   07/23/19 59.9 kg (132 lb)   05/21/19 62.6 kg (138 lb)   03/14/19 67.6 kg (149 lb)   01/24/19 67.1 kg (148 lb)   12/11/18 68 kg (150 lb)     BP Readings from Last 3 Encounters:   07/23/19 155/77   05/21/19 144/76    03/14/19 114/72     Pulse Readings from Last 4 Encounters:   07/23/19 54   05/21/19 53   03/14/19 75   01/24/19 66       Current Outpatient Medications   Medication Sig Dispense Refill     camphor-menthol (DERMASARRA) 0.5-0.5 % external lotion Apply topically 2 times daily as needed for skin care Apply a thin layer to rash on chest       citalopram (CELEXA) 10 MG tablet Take 1 tablet (10 mg) by mouth daily 60 tablet 98     fluticasone (FLONASE) 50 MCG/ACT nasal spray INHALE 1 SPRAY IN EACH NOSTRIL DAILY 16 g 98     latanoprost (XALATAN) 0.005 % ophthalmic solution INSTILL ONE DROP IN EACH EYE EVERY NIGHT AT BEDTIME 2.5 mL 97     levothyroxine (SYNTHROID/LEVOTHROID) 100 MCG tablet TAKE 1 TABLET BY MOUTH ONCE DAILY 31 tablet 98     MAPAP 325 MG tablet TAKE TWO TABLETS (650MG) BY MOUTH FOUR TIMES A DAY AS NEEDED FOR PAIN 180 tablet 98     mirtazapine (REMERON) 15 MG tablet Take 15 mg by mouth At Bedtime       Multiple Vitamins-Minerals (CEROVITE SENIOR) TABS TAKE 1 TABLET BY MOUTH ONCE DAILY 31 tablet 98     Nutritional Supplements (ENSURE PO) Take 8 oz by mouth daily       QUEtiapine Fumarate (SEROQUEL PO) Take 25 mg by mouth daily as needed On shower days.        tamsulosin (FLOMAX) 0.4 MG capsule TAKE 1 CAPSULE BY MOUTH ONCE DAILY 31 capsule 98     VITAMIN D3 1000 units tablet TAKE TWO TABLETS (2000 UNITS) BY MOUTH ONCE DAILY 62 tablet 98       Case Management:  I have reviewed the Assisted Living care plan, current immunizations and preventive care/cancer screening. .Future cancer screening is not clinically indicated secondary to age/goals of care Patient's desire to return to the community is not assessible due to cognitive impairment. Current Level of Care is appropriate.    Advance Directive Discussion:    I reviewed the current advanced directives as reflected in EPIC, the POLST and the facility chart, and verified the congruency of orders. I contacted the first party, friend Shayla, and discussed the plan of  "Care.  I did not due to cognitive impairment review the advance directives with the resident.     Team Discussion:  I communicated with the appropriate disciplines involved with the Plan of Care:   Nursing    Patient's goal is unobtainable secondary to cognitive impairment and family's/responsible party's goal for the patient is comfort and QoL.  Information reviewed:  Medications, vital signs, orders, and nursing notes.    ROS:  Limited secondary to cognitive impairment but today pt reports, \"I'm fine!\"     Vitals:  /77   Pulse 54   Temp 98.4  F (36.9  C)   Resp 20   Wt 59.9 kg (132 lb)  There is no height or weight on file to calculate BMI.  Exam:  GENERAL APPEARANCE:  Alert, fully dressed, clean, unshaven, thin.  HEENT: conjunctiva w/o injection, no drainage, about 2 cm x 1 cm superficial scabbed abrasion to right lateral forehead, non-tender.  RESP:  Respiratory effort and palpation of chest normal, lungs clear to auscultation BL.  CV:  Palpation and auscultation of heart done, regular rate and rhythm, no murmur, rub, or gallop  PV: No edema, calves are supple and non-tender.  GI: Scaphoid abdomen, soft, non-tender, non-distended, + BS.  M/S:   Gait and station shuffling, ambulating independently w/ 4WW, needs cueing to walk from activity room to apartment.  SKIN:  No visible rashes, lesions or ulcerations. Skin without increased warmth or tenderness.  Neuro: Does not follow for cranial nerve testing, no facial asymmetry, no tremor, normal tone  PSYCH:  memory impaired, oriented to self only, speech fluent, judgment impaired, cooperative today.    Lab/Diagnostic data:   Recent labs in [facility] reviewed by me today.   CBC RESULTS:   Recent Labs   Lab Test 05/22/19 11/28/18   WBC 4.6 5.7   RBC 4.13* 4.09*   HGB 13.2* 13.3   HCT 39.5* 38.8*   MCV 96 95   MCH 32.0 32.5   MCHC 33.4 34.3   RDW 14.4 13.8    175       Last Basic Metabolic Panel:  Recent Labs   Lab Test 05/22/19 01/31/19    134 "   POTASSIUM 3.6 4.0   CHLORIDE 102 99   RONALD 9.0 8.9   CO2 30 29   BUN 15 11   CR 0.82 0.75   GLC 91 97     GFR Estimate   Date Value Ref Range Status   05/22/2019 83 >60 ml/min/1.73m2 Final       Liver Function Studies -   Recent Labs   Lab Test 05/22/19 11/28/18   PROTTOTAL 7.0 6.9   ALBUMIN 3.5 3.4   BILITOTAL 0.6 0.6   ALKPHOS 70 62   AST 15 17   ALT 17 17       TSH   Date Value Ref Range Status   01/10/2019 1.00 (A) 0.40 - 4.00 mU/L Final   11/28/2018 0.47 0.40 - 4.00 mU/L Final     Lab Results   Component Value Date    A1C 5.8 11/28/2018       ASSESSMENT/PLAN  (G30.0,  F02.81) Early onset Alzheimer's disease with behavioral disturbance  (primary encounter diagnosis)  Comment: Progressive cognitive and functional decline over last year now with 15# weight loss over last six months. Weight stable 130-135# over last three months.  Plan:   - Continues to benefit from supportive care in [facility]   - Continue Celexa and Mirtazapine, as well as Seroquel prior to showers to target agitation, paranoia, and irritability  - Continue non-pharmacological intervention with agitation as per OT recommendations  - Focus on comfort and QoL, [facility] would consider hospice if further downturn.    (I10) Benign essential hypertension  Comment: BP elevated today off antihypertensives, ACE-I stopped due to syncopal episodes.  BP goals are <150/90 mm Hg.This is higher than ACC and AHA recommendations due to goals of care, risk for hypotension, risk of dizziness and falls and frailty.  Plan:   -  Noted patients BP is higher than goal and will adjust plan of care by having nursing repeat BP and HR next week.   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 Previously treated with methimazole and I-131 therapy in May 2016.   Last TSH 1 on 1/10/2019  Plan:   - Continue levothyroxine 100 mcg daily - dose reduction 11/28/18  - Check TSH with next  routine labs in Nov 2019  - HC agent does not want to bring out to specialist unless would add to comfort.    (R63.4) Loss of weight   Comment: Weight down again from 148# >>132#, difficult to get resident to stay at tablet to eat. Taking supplement.  Plan:   - Monitor weights  - Continue Mitrazapine to 15 mg PO once daily at HS to target depression, agitation and stimulate appetite   - Continue MVI    (J31.0) Rhinitis  Comment: No complaint today  Plan:   - Continue Flonase     (H40.003) Glaucoma   Comment: No reported change in vision   Plan:   - Continue Latanoprost   - On site ophthalmology follow-up    (N40.0) BPH  Comment: No symptoms   Plan:   - Continue Flomax     Orders written by provider at facility.    Spoke with Shayla via telephone, aware of progressive of decline. Not recognizing friends now. Confirms goal of care is comfort focus. Will continue to keep Shayla updated on clinical status, encouraged to call with any questions/concerns.      Electronically signed by:  STAS Ziegler CNP     Append:  Repeat BP  07/30/2019 11:54:05 AM - By: Areli Becker   /73 P 57

## 2019-09-03 PROBLEM — S72.001A CLOSED RIGHT HIP FRACTURE (H): Status: ACTIVE | Noted: 2019-01-01

## 2019-09-03 PROBLEM — S72.001A HIP FRACTURE, RIGHT, CLOSED, INITIAL ENCOUNTER (H): Status: ACTIVE | Noted: 2019-01-01

## 2019-09-03 NOTE — ED TRIAGE NOTES
Unwitnessed fall at [facility]. Patient c/o right hip pain per ems and unable to ambulate after fall.

## 2019-09-03 NOTE — ED NOTES
Bed: ED02  Expected date: 9/3/19  Expected time: 6:15 PM  Means of arrival: Ambulance  Comments:  533 78m fall, dementia ETA 1824

## 2019-09-03 NOTE — ED PROVIDER NOTES
History     Chief Complaint:  Fall    HPI   History limited by patient's dementia and provided by EMS.     Elie Marcano is a 78 year old male with Alzheimer's dementia who presents after an unwitnessed fall. The patient was found down in the hallway at his memory care facility. It is unknown whether he was using his walker at the time. He was able to sit up on his own, but unable to stand. Staff assisted him up, but he was complaining of significant right hip pain and EMS was called. EMS did not note any shortening or rotation. EMS attempted to place him in a c-collar, but the patient kept slipping out of the collar, so it was removed. Staff at the [facility] care facility say that the patient has very advanced dementia and he is at his baseline today. Patient can not provide any additional history.     Allergies:  No known drug allergies     Medications:    Celexa  Xalatan  Levothyroxine  MAPAP  Remeron  Seroquel  Flomax  Vitamin D3    Past Medical History:    Postablative hypothyroidism  Hypertension  Alzheimer's dementia with behavioral disturbance  Vitamin D deficiency   BPH  Chronic sinusitis  Hyperlipidemia    Past Surgical History:    Appendectomy    Family History:    History reviewed. No pertinent family history.      Social History:  Smoking status: Never  Alcohol use: No  Drug use: No  Patient presents alone.  PCP: Debbie Plata    Marital Status:  Single      Review of Systems   Unable to perform ROS: Dementia   Musculoskeletal: Positive for arthralgias.         Physical Exam     Patient Vitals for the past 24 hrs:   BP Temp Temp src Pulse Resp SpO2   09/03/19 2020 136/72 -- -- -- -- 99 %   09/03/19 1836 (!) 152/73 98.6  F (37  C) Temporal 67 18 100 %      Physical Exam  General: Alert in mild distress  Head:  Scalp is atraumatic  Eyes:  The pupils are equal, round, and reactive to light    EOM's intact    No scleral icterus  ENT:      Nose:  The external nose is normal  Ears:  External ears are  normal  Mouth/Throat: The oropharynx is normal    Mucus membranes are moist       Neck:  Normal range of motion.      There is no rigidity.    Trachea is in the midline         CV:  Regular rate and rhythm    No murmur   Resp:  Breath sounds are clear bilaterally    Non-labored, no retractions or accessory muscle use      GI:  Abdomen is soft, no distension, no tenderness.       MS:  Normal strength in all 4 extremities, No midline cervical, thoracic, or lumbar tenderness. Limited range of motion of right leg due to pain.   Skin:  Warm and dry, No rash or lesions noted.  Neuro: Strength 5/5 x4.  Sensation intact  In all 4 extremities.      GCS: 14 confused at baseline  Psych:  Awake. Alert.  Agitated affect.          Emergency Department Course     ECG (18:46:45):  Rate 63 bpm. MI interval 158. QRS duration 80. QT/QTc 436/446. P-R-T axes 80 64 75. Normal sinus rhythm. No significant change when compared to EKG dated 6/11/18.  Interpreted at 1849 by Trigger, Jose Reyes MD.     Imaging:  Radiographic findings were communicated with the patient, Admitting MD and Power of  who voiced understanding of the findings.    X-ray Cervical spine, 2/3 views:  There is left convex curvature of the cervical spine  centered at the C5 level. Alignment of the cervical vertebrae is  otherwise normal. Vertebral body heights of the cervical spine appear  normal. There is no definite evidence for fracture of the cervical  spine, although evaluation is limited by left convex curvature. There  is severe degenerative endplate spurring and severe facet arthropathy  throughout the cervical spine. There is no prevertebral soft tissue  swelling.  Result per radiology.      X-ray Pelvis with hip right, 1 views:  Right femoral neck fracture with mild impaction and slight  displacement. Small comminuted fragment laterally.  Result per radiology.      CT-scan Head w/o contrast:  Diffuse cerebral volume loss and cerebral white  matter  changes consistent with chronic small vessel ischemic disease. No  evidence for acute intracranial pathology.  Result per radiology.      X-ray Chest, 1 views:  No acute cardiopulmonary disease.  Result per radiology.     CT-scan Cervical spine w/o contrast:  Subtle left convex curvature of the cervical spine again  noted. Alignment of the cervical vertebrae is otherwise normal.  Vertebral body heights of the cervical spine are normal.  Craniocervical alignment is normal. There is no evidence for fracture  of the cervical spine. Partial assimilation of C1 to the occiput is  noted. There is degenerative endplate spurring and degenerative facet  arthropathy throughout cervical spine. There is no degenerative spinal  canal narrowing of the cervical spine. There is no prevertebral soft  tissue swelling.  Result per radiology.      Laboratory:  CBC: WNL (WBC 4.3, HGB 14.0, )  BMP: Sodium 131 (L), Potassium 3.3 (L), Glucose 105 (H), o/w WNL (Creatinine 0.84)  PTT: 30  INR: 0.95  ABO/Rh: A-    Interventions:  2016 - potassium chloride 10 mEq IV  2018 - Morphine 4 mg IV    Emergency Department Course:  The patient arrived in the emergency department via EMS.    Past medical records, nursing notes, and vitals reviewed.  1830: I performed an exam of the patient and obtained history, as documented above.    IV inserted and blood drawn. This was sent to laboratory for testing, findings above. The patient was sent for a C-spine and pelvis x-ray, chest x-ray, head CT, and C-spine CT while in the emergency department, findings above.      1956: I spoke with and updated the patient's Power of  who is in [place], Shayla Mitchell, over the phone at [phone].    Findings and plan explained to the Power of  who consents to admission.     2028: Discussed the patient with Dr. Loving, who will admit the patient to an orthopedic bed for further monitoring, evaluation, and treatment.      Impression & Plan       Medical Decision Making:  Elie Marcano is a 78 year old male who was seen and evaluated. History is quite limited given the patient's baseline dementia. Findings are consistent with right hip fracture. CT of the head and c-spine were obtained given the patient's dementia. Thankfully, these were unremarkable. There is no signs of neurovascular compromise or more concerning illness. We have spoke with the patient's Power of  who is currently in [place]. She is agreeable to hospitalization. Laboratory work up is reassuring. He does have a mild hypokalemia which was replaced here. I spoke with Dr. Loving from the hospitalist service who is in agreement with admission for orthopedic consultation and operative planning. Of note, at baseline the patient does ambulate with a walker.      Diagnosis:    ICD-10-CM    1. Hip fracture, right, closed, initial encounter (H) S72.001A Partial thromboplastin time     ABO/Rh type and screen     Basic metabolic panel   2. Hypokalemia E87.6    3. Dementia without behavioral disturbance, unspecified dementia type F03.90      Disposition:  Admitted.     Misha Peñaloza  9/3/2019    EMERGENCY DEPARTMENT    Scribe Disclosure:  Misha VELASCO Chi, am serving as a scribe at 6:26 PM on 9/3/2019 to document services personally performed by Jose Oshea MD based on my observations and the provider's statements to me.        Jose Oshea MD  09/04/19 0005

## 2019-09-04 PROBLEM — S72.009A HIP FRACTURE REQUIRING OPERATIVE REPAIR (H): Status: ACTIVE | Noted: 2019-01-01

## 2019-09-04 NOTE — CONSULTS
[facility]    Orthopedic Consultation    Elie Marcano MRN# [id number]   Age: 78 year old YOB: 1940     Date of Admission: 9/3/2019    Reason for consult: Right hip femoral neck fracture       Requesting physician: Terrance Loving       Level of consult: Consult, follow and place orders           Assessment and Plan:   Assessment:   Right hip femoral neck fracture        Plan:   Patient scheduled for a Right hip percutaneous pinning to be performed by Dr. Frank today, 9/4/19 pending consent. Multiple calls have been attempted to reach the SONIA Mcguire who is in currently in [place].  Verbal Consent was obtained by Jordyn Alvarez over the phone.   Continue NPO Status  Bedrest until postop  Nonweightbearing Right LE  Pain medication as needed, minimize narcotics as able  X-rays and treatment plan are reviewed and agreed upon by Dr. Frank           Chief Complaint:   Right hip pain following a fall         History of Present Illness:   Elie Marcano is a 78 year old male with Alzheimer's dementia who presented to the ED after an unwitnessed fall. The patient was found down in the hallway at his memory care facility. It is unknown whether he was using his walker at the time. He was able to sit up on his own, but unable to stand. Staff assisted him up, but he was complaining of significant right hip pain and EMS was called.   Xrays in ED revealed a right femoral neck fracture          Past Medical History:     Past Medical History:   Diagnosis Date     Alzheimer disease      BPH (benign prostatic hyperplasia)      Chronic sinusitis      Cognitive impairment      Elevated PSA      Hyperlipidemia      Hypertension      Syncope      Thyroid disease     Graves disease, Multinodular goiter     Vitamin D deficiency      Weight loss              Past Surgical History:     Past Surgical History:   Procedure Laterality Date     APPENDECTOMY               Social History:     Social History     Tobacco  Use     Smoking status: Never Smoker     Smokeless tobacco: Never Used   Substance Use Topics     Alcohol use: No             Family History:     Family History   Family history unknown: Yes             Immunizations:     VACCINE/DOSE   Diptheria   DPT   DTAP   HBIG   Hepatitis A   Hepatitis B   HIB   Influenza   Measles   Meningococcal   MMR   Mumps   Pneumococcal   Polio   Rubella   Small Pox   TDAP   Varicella   Zoster             Allergies:   No Known Allergies          Medications:     Current Facility-Administered Medications   Medication     acetaminophen (TYLENOL) tablet 650 mg     citalopram (celeXA) tablet 10 mg     fluticasone (FLONASE) 50 MCG/ACT spray 1 spray     latanoprost (XALATAN) 0.005 % ophthalmic solution 1 drop     levothyroxine (SYNTHROID/LEVOTHROID) tablet 100 mcg     lidocaine (LMX4) cream     lidocaine 1 % 0.1-1 mL     magnesium sulfate 4 g in 100 mL sterile water (premade)     melatonin tablet 1 mg     metoclopramide (REGLAN) tablet 5 mg    Or     metoclopramide (REGLAN) injection 5 mg     mirtazapine (REMERON) tablet 15 mg     morphine (PF) injection 1 mg     multivitamin w/minerals (THERA-VIT-M) tablet 1 tablet     naloxone (NARCAN) injection 0.1-0.4 mg     ondansetron (ZOFRAN-ODT) ODT tab 4 mg    Or     ondansetron (ZOFRAN) injection 4 mg     oxyCODONE (ROXICODONE) tablet 5-10 mg     polyethylene glycol (MIRALAX/GLYCOLAX) Packet 17 g     potassium chloride (KLOR-CON) Packet 20-40 mEq     potassium chloride 10 mEq in 100 mL intermittent infusion with 10 mg lidocaine     potassium chloride 10 mEq in 100 mL sterile water intermittent infusion (premix)     potassium chloride ER (K-DUR/KLOR-CON M) CR tablet 20-40 mEq     prochlorperazine (COMPAZINE) injection 5 mg    Or     prochlorperazine (COMPAZINE) tablet 5 mg    Or     prochlorperazine (COMPAZINE) Suppository 12.5 mg     QUEtiapine (SEROquel) tablet 25 mg     senna-docusate (SENOKOT-S/PERICOLACE) 8.6-50 MG per tablet 1 tablet    Or      senna-docusate (SENOKOT-S/PERICOLACE) 8.6-50 MG per tablet 2 tablet     sodium chloride (PF) 0.9% PF flush 3 mL     sodium chloride (PF) 0.9% PF flush 3 mL     sodium chloride 0.9% infusion     tamsulosin (FLOMAX) capsule 0.4 mg     vitamin D3 (CHOLECALCIFEROL) 2000 units (50 mcg) tablet 2,000 Units             Review of Systems:   CONSTITUTIONAL: NEGATIVE for fever, chills, change in weight  CV: NEGATIVE for chest pain, palpitations or peripheral edema  C: NEGATIVE for fever, chills, change in weight  E/M: NEGATIVE for ear, mouth and throat problems  R: NEGATIVE for significant cough or SOB          Physical Exam:   All vitals have been reviewed  Patient Vitals for the past 24 hrs:   BP Temp Temp src Pulse Resp SpO2 Weight   09/04/19 0100 (!) 160/83 98.5  F (36.9  C) Axillary 73 16 -- --   09/03/19 2317 -- -- -- -- 18 -- --   09/03/19 2223 120/66 98  F (36.7  C) Oral 70 18 96 % --   09/03/19 2205 -- -- -- -- -- -- 61.4 kg (135 lb 6.4 oz)   09/03/19 2020 136/72 -- -- -- -- 99 % --   09/03/19 1836 (!) 152/73 98.6  F (37  C) Temporal 67 18 100 % --     No intake or output data in the 24 hours ending 09/04/19 0729    On physical exam of the right lower extremity, patient's leg is resting in a neutral position without shortening.  No skin abrasions or ecchymosis seen.  Patient is able to dorsi and plantarflex both ankles.  Full sensation to light touch on the left versus right lower extremities.  Distal pulses are intact and equal bilaterally.            Data:   All laboratory data reviewed  Results for orders placed or performed during the hospital encounter of 09/03/19   XR Pelvis w Hip Right 1 View    Narrative    PELVIS AND HIP RIGHT ONE VIEW   9/3/2019 7:45 PM     HISTORY:  Pain    FINDINGS: Degenerative change of the lower lumbar spine.      Impression    IMPRESSION: Right femoral neck fracture with mild impaction and slight  displacement. Small comminuted fragment laterally.    JULIUS WINTER MD   CT Head w/o  Contrast    Narrative    CT OF THE HEAD WITHOUT CONTRAST 9/3/2019 7:56 PM     COMPARISON: None    HISTORY: Altered level of consciousness (LOC), unexplained.    TECHNIQUE: 5 mm thick axial CT images of the head were acquired  without IV contrast material.    FINDINGS: There is moderate diffuse cerebral volume loss. There are  subtle patchy areas of decreased density in the cerebral white matter  bilaterally that are consistent with sequela of chronic small vessel  ischemic disease.    The ventricles and basal cisterns are within normal limits in  configuration given the degree of cerebral volume loss. There is no  midline shift. There are no extra-axial fluid collections.    No intracranial hemorrhage, mass or recent infarct.    The visualized paranasal sinuses are well-aerated. There is no  mastoiditis. There are no fractures of the visualized bones.      Impression    IMPRESSION: Diffuse cerebral volume loss and cerebral white matter  changes consistent with chronic small vessel ischemic disease. No  evidence for acute intracranial pathology.      Radiation dose for this scan was reduced using automated exposure  control, adjustment of the mA and/or kV according to patient size, or  iterative reconstruction technique    ABIEL SKELTON MD   XR Cervical Spine 2/3 Views    Narrative    CERVICAL SPINE TWO TO THREE VIEWS 9/3/2019 7:49 PM     COMPARISON: None    HISTORY: Fall/dementia      Impression    IMPRESSION: There is left convex curvature of the cervical spine  centered at the C5 level. Alignment of the cervical vertebrae is  otherwise normal. Vertebral body heights of the cervical spine appear  normal. There is no definite evidence for fracture of the cervical  spine, although evaluation is limited by left convex curvature. There  is severe degenerative endplate spurring and severe facet arthropathy  throughout the cervical spine. There is no prevertebral soft tissue  swelling.    ABIEL SKELTON MD   XR Chest 1  View    Narrative    CHEST ONE VIEW   9/3/2019 7:48 PM     HISTORY: Preoperative    COMPARISON: 6/6/2018.      Impression    IMPRESSION: No acute cardiopulmonary disease.    ONELIA GIORDANO MD   CT Cervical Spine w/o Contrast    Narrative    CT OF THE CERVICAL SPINE WITHOUT CONTRAST   9/3/2019 7:56 PM     COMPARISON: Cervical spine x-ray series same day.    HISTORY: Cervical spine trauma, high clinical risk (NEXUS/CCR)     TECHNIQUE: Axial images of the cervical spine were acquired without  intravenous contrast. Multiplanar reformations were created.        Impression    IMPRESSION: Subtle left convex curvature of the cervical spine again  noted. Alignment of the cervical vertebrae is otherwise normal.  Vertebral body heights of the cervical spine are normal.  Craniocervical alignment is normal. There is no evidence for fracture  of the cervical spine. Partial assimilation of C1 to the occiput is  noted. There is degenerative endplate spurring and degenerative facet  arthropathy throughout cervical spine. There is no degenerative spinal  canal narrowing of the cervical spine. There is no prevertebral soft  tissue swelling.      Radiation dose for this scan was reduced using automated exposure  control, adjustment of the mA and/or kV according to patient size, or  iterative reconstruction technique    ABIEL SKELTON MD   CBC with platelets differential   Result Value Ref Range    WBC 4.3 4.0 - 11.0 10e9/L    RBC Count 4.26 (L) 4.4 - 5.9 10e12/L    Hemoglobin 14.0 13.3 - 17.7 g/dL    Hematocrit 40.4 40.0 - 53.0 %    MCV 95 78 - 100 fl    MCH 32.9 26.5 - 33.0 pg    MCHC 34.7 31.5 - 36.5 g/dL    RDW 13.6 10.0 - 15.0 %    Platelet Count 185 150 - 450 10e9/L    Diff Method Automated Method     % Neutrophils 50.6 %    % Lymphocytes 36.9 %    % Monocytes 10.6 %    % Eosinophils 1.2 %    % Basophils 0.5 %    % Immature Granulocytes 0.2 %    Nucleated RBCs 0 0 /100    Absolute Neutrophil 2.2 1.6 - 8.3 10e9/L    Absolute  Lymphocytes 1.6 0.8 - 5.3 10e9/L    Absolute Monocytes 0.5 0.0 - 1.3 10e9/L    Absolute Eosinophils 0.1 0.0 - 0.7 10e9/L    Absolute Basophils 0.0 0.0 - 0.2 10e9/L    Abs Immature Granulocytes 0.0 0 - 0.4 10e9/L    Absolute Nucleated RBC 0.0    INR   Result Value Ref Range    INR 0.95 0.86 - 1.14   Partial thromboplastin time   Result Value Ref Range    PTT 30 22 - 37 sec   Basic metabolic panel   Result Value Ref Range    Sodium 131 (L) 133 - 144 mmol/L    Potassium 3.3 (L) 3.4 - 5.3 mmol/L    Chloride 97 94 - 109 mmol/L    Carbon Dioxide 25 20 - 32 mmol/L    Anion Gap 9 3 - 14 mmol/L    Glucose 105 (H) 70 - 99 mg/dL    Urea Nitrogen 13 7 - 30 mg/dL    Creatinine 0.84 0.66 - 1.25 mg/dL    GFR Estimate 82 >60 mL/min/[1.73_m2]    GFR Estimate If Black >90 >60 mL/min/[1.73_m2]    Calcium 9.2 8.5 - 10.1 mg/dL   Basic metabolic panel   Result Value Ref Range    Sodium 131 (L) 133 - 144 mmol/L    Potassium 4.3 3.4 - 5.3 mmol/L    Chloride 101 94 - 109 mmol/L    Carbon Dioxide 25 20 - 32 mmol/L    Anion Gap 5 3 - 14 mmol/L    Glucose 132 (H) 70 - 99 mg/dL    Urea Nitrogen 15 7 - 30 mg/dL    Creatinine 0.89 0.66 - 1.25 mg/dL    GFR Estimate 80 >60 mL/min/[1.73_m2]    GFR Estimate If Black >90 >60 mL/min/[1.73_m2]    Calcium 8.4 (L) 8.5 - 10.1 mg/dL   EKG 12-lead, tracing only   Result Value Ref Range    Interpretation ECG Click View Image link to view waveform and result    ABO/Rh type and screen   Result Value Ref Range    ABO A     RH(D) Neg     Antibody Screen Neg     Test Valid Only At [facility]        Specimen Expires 09/06/2019           Attestation:  I have reviewed today's vital signs, notes, medications, labs and imaging with Dr. Frank.  Amount of time performed on this consult: 30 minutes.    Fabi Rmoan PA-C

## 2019-09-04 NOTE — ANESTHESIA PREPROCEDURE EVALUATION
Anesthesia Pre-Procedure Evaluation    Patient: Elie Marcano   MRN: [id number] : 1940          Preoperative Diagnosis: HIP FRACTURE.    Procedure(s):  CLOSED REDUCTION RIGHT HIP, WITH PERCUTANEOUS SCREWS RIGHT HIP.(SYNTHES 6.5 SCREWS, FX TABLE, C-ARM)    Past Medical History:   Diagnosis Date     Alzheimer disease      BPH (benign prostatic hyperplasia)      Chronic sinusitis      Cognitive impairment      Elevated PSA      Hyperlipidemia      Hypertension      Syncope      Thyroid disease     Graves disease, Multinodular goiter     Vitamin D deficiency      Weight loss      Past Surgical History:   Procedure Laterality Date     APPENDECTOMY         Anesthesia Evaluation     . Pt has not had prior anesthetic            ROS/MED HX    ENT/Pulmonary:      (-) tobacco use, asthma, COPD and sleep apnea   Neurologic:     (+)dementia (alzheimer's, severe dementia),    (-) seizures, CVA and migraines   Cardiovascular:     (+) Dyslipidemia, hypertension----. : . . . :. .      (-) CAD, SARGENT, syncope and valvular problems/murmurs   METS/Exercise Tolerance:  >4 METS   Hematologic:        (-) history of blood clots, anemia and other hematologic disorder   Musculoskeletal:        (-) arthritis   GI/Hepatic:        (-) GERD and liver disease   Renal/Genitourinary:     (+) BPH,    (-) renal disease   Endo:     (+) thyroid problem hypothyroidism, Other Endocrine Disorder possible SIADH - sodium at 131.   (-) Type I DM, Type II DM and obesity   Psychiatric:        (-) psychiatric history   Infectious Disease:        (-) Recent Fever   Malignancy:         Other:                          Physical Exam  Normal systems: cardiovascular, pulmonary and dental    Airway   Mallampati: II  TM distance: >3 FB  Neck ROM: full    Dental     Cardiovascular   Rhythm and rate: regular and normal  (-) no murmur    Pulmonary    breath sounds clear to auscultation            Lab Results   Component Value Date    WBC 4.3 2019    HGB  14.0 09/03/2019    HCT 40.4 09/03/2019     09/03/2019     (L) 09/04/2019    POTASSIUM 4.3 09/04/2019    CHLORIDE 101 09/04/2019    CO2 25 09/04/2019    BUN 15 09/04/2019    CR 0.89 09/04/2019     (H) 09/04/2019    RONALD 8.4 (L) 09/04/2019    ALBUMIN 3.5 05/22/2019    PROTTOTAL 7.0 05/22/2019    ALT 17 05/22/2019    AST 15 05/22/2019    ALKPHOS 70 05/22/2019    BILITOTAL 0.6 05/22/2019    PTT 30 09/03/2019    INR 0.95 09/03/2019    TSH 1.00 (A) 01/10/2019       Preop Vitals  BP Readings from Last 3 Encounters:   09/04/19 (!) 146/64   07/30/19 133/73   05/21/19 144/76    Pulse Readings from Last 3 Encounters:   09/04/19 54   07/30/19 57   05/21/19 53      Resp Readings from Last 3 Encounters:   09/04/19 16   07/23/19 20   05/21/19 20    SpO2 Readings from Last 3 Encounters:   09/04/19 98%   05/21/19 99%   03/14/19 98%      Temp Readings from Last 1 Encounters:   09/04/19 36.6  C (97.8  F) (Axillary)    Ht Readings from Last 1 Encounters:   No data found for Ht      Wt Readings from Last 1 Encounters:   09/03/19 61.4 kg (135 lb 6.4 oz)    There is no height or weight on file to calculate BMI.       Anesthesia Plan      History & Physical Review      ASA Status:  3 .    NPO Status:  > 8 hours    Plan for General and ETT with Propofol induction. Maintenance will be TIVA.    PONV prophylaxis:  Ondansetron (or other 5HT-3) and Dexamethasone or Solumedrol  Propofol infusion  Precedex as needed.       Postoperative Care  Postoperative pain management:  Multi-modal analgesia.      Consents  Anesthetic plan, risks, benefits and alternatives discussed with:  Patient..                 Dominga Baer MD

## 2019-09-04 NOTE — PLAN OF CARE
PT: Orders received, chart reviewed. Pt admitted with hip fx following fall, still awaiting ortho consult/note, chart indicating surgical intervention. Will reschedule post-op and POC determined.

## 2019-09-04 NOTE — PHARMACY-ADMISSION MEDICATION HISTORY
Admission medication history interview status for the 9/3/2019  admission is complete. See EPIC admission navigator for prior to admission medications     Medication history source reliability:Good    Actions taken by pharmacist (provider contacted, etc): consulted nursing home med list     Additional medication history information not noted on PTA med list :None    Medication reconciliation/reorder completed by provider prior to medication history? No    Time spent in this activity: 15 minutes    Prior to Admission medications    Medication Sig Last Dose Taking? Auth Provider   camphor-menthol (DERMASARRA) 0.5-0.5 % external lotion Apply topically 2 times daily as needed for skin care Apply a thin layer to rash on chest as needed Yes Reported, Patient   citalopram (CELEXA) 10 MG tablet TAKE 1 TABLET BY MOUTH ONCE DAILY 9/3/2019 at Unknown time Yes Debbie Plata APRN CNP   fluticasone (FLONASE) 50 MCG/ACT nasal spray INHALE 1 SPRAY IN EACH NOSTRIL DAILY 9/3/2019 at Unknown time Yes Debbie Plata APRN CNP   latanoprost (XALATAN) 0.005 % ophthalmic solution INSTILL ONE DROP IN EACH EYE EVERY NIGHT AT BEDTIME 9/2/2019 at Unknown time Yes Debbie Plata APRN CNP   levothyroxine (SYNTHROID/LEVOTHROID) 100 MCG tablet TAKE 1 TABLET BY MOUTH ONCE DAILY 9/3/2019 at Unknown time Yes Debbie Plata APRN CNP   MAPAP 325 MG tablet TAKE TWO TABLETS (650MG) BY MOUTH FOUR TIMES A DAY AS NEEDED FOR PAIN as needed Yes Debbie Plata APRN CNP   mirtazapine (REMERON) 15 MG tablet Take 15 mg by mouth At Bedtime 9/2/2019 at Unknown time Yes Reported, Patient   multivitamin w/minerals (THERA-VIT-M) tablet Take 1 tablet by mouth At Bedtime 9/2/2019 at Unknown time Yes Unknown, Entered By History   QUEtiapine Fumarate (SEROQUEL PO) Take 25 mg by mouth daily as needed On shower days.  as needed Yes Reported, Patient   tamsulosin (FLOMAX) 0.4 MG capsule TAKE 1 CAPSULE BY MOUTH ONCE DAILY 9/3/2019 at Unknown time Yes  Debbie Plata APRN CNP   VITAMIN D3 1000 units tablet TAKE TWO TABLETS (2000 UNITS) BY MOUTH ONCE DAILY 9/3/2019 at Unknown time Yes Debbie Plata APRN CNP

## 2019-09-04 NOTE — PROGRESS NOTES
RECEIVING UNIT ED HANDOFF REVIEW    ED Nurse Handoff Report was reviewed by: Andie Logan RN on September 3, 2019 at 9:07 PM

## 2019-09-04 NOTE — ANESTHESIA CARE TRANSFER NOTE
Patient: Elie Marcano    Procedure(s):  CLOSED REDUCTION RIGHT HIP, WITH PERCUTANEOUS SCREWS RIGHT HIP    Diagnosis: HIP FRACTURE.  Diagnosis Additional Information: No value filed.    Anesthesia Type:   No value filed.     Note:  Airway :ETT and T-piece  Patient transferred to:PACU  Comments: Pt exhibits spont resps, all monitors and alarms on in pacu, report given to RN, vss.Handoff Report: Identifed the Patient, Identified the Reponsible Provider, Reviewed the pertinent medical history, Discussed the surgical course, Reviewed Intra-OP anesthesia mangement and issues during anesthesia, Set expectations for post-procedure period and Allowed opportunity for questions and acknowledgement of understanding      Vitals: (Last set prior to Anesthesia Care Transfer)    CRNA VITALS  9/4/2019 1340 - 9/4/2019 1416      9/4/2019             Pulse:  79    SpO2:  100 %    Resp Rate (set):  10                Electronically Signed By: STAS Clark CRNA  September 4, 2019  2:16 PM

## 2019-09-04 NOTE — BRIEF OP NOTE
[facility]    Brief Operative Note    Pre-operative diagnosis: HIP FRACTURE.  Post-operative diagnosis same  Procedure: Procedure(s):  CLOSED REDUCTION RIGHT HIP, WITH PERCUTANEOUS SCREWS RIGHT HIP  Surgeon: Surgeon(s) and Role:     * Last Frank MD - Primary  Anesthesia: General   Estimated blood loss: Less than 50 ml  Drains: None  Specimens: * No specimens in log *  Findings:   None.  Complications: None.  Implants:    Implant Name Type Inv. Item Serial No.  Lot No. LRB No. Used   IMP SCR SYN CAN 6.5X16 [id number] Metallic Hardware/Louisa IMP SCR SYN CAN 6.5X16 [id number]  [employer] LOAD 42 [id number] Right 2   IMP SCR SYN CAN 6.5X16 [id number] Metallic Hardware/Louisa IMP SCR SYN CAN 6.5X16 [id number]  [employer] LOAD 42 07 [id number] Right 1

## 2019-09-04 NOTE — PLAN OF CARE
OT: Orders received, chart reviewed. Pt admitted with hip fx following fall, still awaiting ortho consult/note, chart indicating surgical intervention. Will reschedule post-op and POC determined.

## 2019-09-04 NOTE — H&P
[facility]    History and Physical  Hospitalist       Date of Admission:  9/3/2019    Assessment & Plan     This is an 78-year-old male with history of Alzheimer's dementia, resident of a memory care unit, history of hypertension, hyperlipidemia, BPH, hypothyroidism who was sent to the ER because of unwitnessed fall and right hip pain.       ASSESSMENT AND PLAN:   1.  Right impacted femoral neck fracture:  The patient has an impacted right femoral neck fracture with mild displacement.  At this time, we will keep him on bed rest, nonweightbearing on the right lower extremity, keep n.p.o., IV fluids, pain medication, oxycodone, Tylenol and IV morphine.  Consult Orthopedic to evaluate for possible surgery.  The patient has a court-appointed power of .  Currently in [place].  ER physician did talk with her and she wanted him to have the surgery to fix his hip fracture.     2.  Unwitnessed fall:  Reason for the fall is uncertain.  Apparently, no unconsciousness.  CT scan of the head is negative for any acute abnormality.  CT cervical spine negative for any acute fracture.  Chest x-ray negative for any pneumothorax or rib fracture.  The patient is conscious and alert at this time.   3.  Preoperative evaluation: This is an 78-year-old male with a history of hypertension, hyperlipidemia, Alzheimer dementia.  Clinically he is stable.  Comorbidities are well controlled and unmodifiable at this time.  His blood pressure is good.  His chest x-ray, no congestive changes or infiltrate.  EKG does not show any acute ischemic changes.  On labs, has some mild hypokalemia, mild hyponatremia.  I think we can correct the potassium overnight.  Sodium of 130 or above will be okay to go for the intended surgery.  Only thing we need to watch out postoperatively is postoperative delirium or encephalopathy.  High risk of that as the patient had severe dementia.   4.  Alzheimer's dementia:  The patient has quite advanced  dementia.  He is on Remeron and Seroquel.  We will continue with that.   5.  Hypokalemia:  Replace the potassium.  Overnight will give him 40 of p.o. potassium and check again his BMP in the morning.   6.  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  We will check urine sodium and urine osmolality.  He is on citalopram and that may be the reason for his mild hyponatremia.   7.  Hypothyroidism, on levothyroxine:  We will continue with that.   8.  History of benign prostatic hypertrophy, on Flomax:  I will continue with that.   9.  Deep venous thrombosis prophylaxis with SCDs prior to surgery and then as per Orthopedic Surgery after the fracture repair.      CODE STATUS:  According to his records from the memory care unit, he is DNR/DNI.      The case was discussed with the ER physician and the nursing staff taking care of the patient.         TERRANCE LOVING MD            DVT Prophylaxis: Pneumatic Compression Devices  Code Status: DNR / DNI    Disposition: Expected discharge in 2 days once stable.    Terrance Loving MD    Primary Care Physician   Debbie Plata    Chief Complaint   Unwitnessed fall     History is obtained from the patient, ER Physician and Medical records     History of Present Illness   Admitted:     09/03/2019      HISTORY OF PRESENT ILLNESS:  This is an 78-year-old male with history of Alzheimer's dementia, resident of a memory care unit, history of hypertension, hyperlipidemia, BPH, hypothyroidism who was sent to the ER because of unwitnessed fall and right hip pain.      The patient is a very poor historian, has baseline dementia, unable to provide any history at this time.  Most of the history is obtained from the medical records, talking to the ER physician.  The patient had an unwitnessed fall in the hallway, not sure that he was using his walker or not, found on the floor in the hallway.  He is able to sit by himself but unable to stand up.  When they tried to stand him up, he had pain in  "the right hip.  Subsequently, he was sent to the ER.      There is no obvious history of any vomiting or passing out.  The patient was not found unconscious.  No history of fever, chills, headache, dizziness at this time.  On review of systems, the patient is unable to give much history.  He said no to most of the questions and said \"I'm doing fine\" and denies even having any pain at this time.      In the ER, he had an extensive workup done for the fall including CT scan of the head, CT of the cervical spine, an x-ray of pelvis and chest x-ray.  CT pelvis does show impacted fracture of the right hip, so the Hospitalist is consulted to admit the patient.     Past Medical History    I have reviewed this patient's medical history and updated it with pertinent information if needed.   Past Medical History:   Diagnosis Date     Alzheimer disease      BPH (benign prostatic hyperplasia)      Chronic sinusitis      Cognitive impairment      Elevated PSA      Hyperlipidemia      Hypertension      Syncope      Thyroid disease     Graves disease, Multinodular goiter     Vitamin D deficiency      Weight loss        Past Surgical History   I have reviewed this patient's surgical history and updated it with pertinent information if needed.  Past Surgical History:   Procedure Laterality Date     APPENDECTOMY         Prior to Admission Medications   Prior to Admission Medications   Prescriptions Last Dose Informant Patient Reported? Taking?   MAPAP 325 MG tablet as needed Nursing Home No Yes   Sig: TAKE TWO TABLETS (650MG) BY MOUTH FOUR TIMES A DAY AS NEEDED FOR PAIN   QUEtiapine Fumarate (SEROQUEL PO) as needed Nursing Home Yes Yes   Sig: Take 25 mg by mouth daily as needed On shower days.    VITAMIN D3 1000 units tablet 9/3/2019 at Unknown time jail No Yes   Sig: TAKE TWO TABLETS (2000 UNITS) BY MOUTH ONCE DAILY   camphor-menthol (DERMASARRA) 0.5-0.5 % external lotion as needed Nursing Home Yes Yes   Sig: Apply topically " 2 times daily as needed for skin care Apply a thin layer to rash on chest   citalopram (CELEXA) 10 MG tablet 9/3/2019 at Unknown time half-way No Yes   Sig: TAKE 1 TABLET BY MOUTH ONCE DAILY   fluticasone (FLONASE) 50 MCG/ACT nasal spray 9/3/2019 at Unknown time half-way No Yes   Sig: INHALE 1 SPRAY IN EACH NOSTRIL DAILY   latanoprost (XALATAN) 0.005 % ophthalmic solution 9/2/2019 at Unknown time half-way No Yes   Sig: INSTILL ONE DROP IN EACH EYE EVERY NIGHT AT BEDTIME   levothyroxine (SYNTHROID/LEVOTHROID) 100 MCG tablet 9/3/2019 at Unknown time half-way No Yes   Sig: TAKE 1 TABLET BY MOUTH ONCE DAILY   mirtazapine (REMERON) 15 MG tablet 9/2/2019 at Unknown time Nursing Home Yes Yes   Sig: Take 15 mg by mouth At Bedtime   multivitamin w/minerals (THERA-VIT-M) tablet 9/2/2019 at Unknown time Nursing Home Yes Yes   Sig: Take 1 tablet by mouth At Bedtime   tamsulosin (FLOMAX) 0.4 MG capsule 9/3/2019 at Unknown time half-way No Yes   Sig: TAKE 1 CAPSULE BY MOUTH ONCE DAILY      Facility-Administered Medications: None     Allergies   No Known Allergies    Social History   I have reviewed this patient's social history and updated it with pertinent information if needed. Elie Floresjermaneleazar  reports that he has never smoked. He has never used smokeless tobacco. He reports that he does not drink alcohol or use drugs.    Family History   I have reviewed this patient's family history and updated it with pertinent information if needed.   Family History   Family history unknown: Yes       Review of Systems   Review of systems is limited by patient factors - Dementia   CONSTITUTIONAL:  negative  EYES:  negative  HEENT:  negative  RESPIRATORY:  negative  CARDIOVASCULAR:  negative  GASTROINTESTINAL:  negative  GENITOURINARY:  negative  INTEGUMENT/BREAST:  negative  HEMATOLOGIC/LYMPHATIC:  negative  ALLERGIC/IMMUNOLOGIC:  negative  ENDOCRINE:  negative  MUSCULOSKELETAL:  negative  NEUROLOGICAL:   negative  BEHAVIOR/PSYCH:  negative    Physical Exam   Temp: 98.6  F (37  C) Temp src: Temporal BP: 136/72 Pulse: 67   Resp: 18 SpO2: 99 %      Vital Signs with Ranges  Temp:  [98.6  F (37  C)] 98.6  F (37  C)  Pulse:  [67] 67  Resp:  [18] 18  BP: (136-152)/(72-73) 136/72  SpO2:  [99 %-100 %] 99 %  0 lbs 0 oz    Constitutional: Awake, alert, cooperative, no apparent distress.  Eyes: Conjunctiva and pupils examined and normal.  HEENT: Moist mucous membranes, normal dentition.  Respiratory: Clear to auscultation bilaterally, no crackles or wheezing.  Cardiovascular: Regular rate and rhythm, normal S1 and S2, and no murmur noted.  GI: Soft, non-distended, non-tender, normal bowel sounds.  Lymph/Hematologic: No anterior cervical or supraclavicular adenopathy.  Skin: No rashes, no cyanosis, no edema.  Musculoskeletal: moving both LE, flex at the hip and knee joint, some pain and tenderness on ROM.  Neurologic: no focal deficit.       Data   Data reviewed today:  I personally reviewed the EKG tracing showing NSR, no acute ischemic changes .  Recent Labs   Lab 09/03/19  1912   WBC 4.3   HGB 14.0   MCV 95      INR 0.95   *   POTASSIUM 3.3*   CHLORIDE 97   CO2 25   BUN 13   CR 0.84   ANIONGAP 9   RONALD 9.2   *       Recent Results (from the past 24 hour(s))   XR Pelvis w Hip Right 1 View    Narrative    PELVIS AND HIP RIGHT ONE VIEW   9/3/2019 7:45 PM     HISTORY:  Pain    FINDINGS: Degenerative change of the lower lumbar spine.      Impression    IMPRESSION: Right femoral neck fracture with mild impaction and slight  displacement. Small comminuted fragment laterally.    JULIUS WINTER MD   XR Chest 1 View    Narrative    CHEST ONE VIEW   9/3/2019 7:48 PM     HISTORY: Preoperative    COMPARISON: 6/6/2018.      Impression    IMPRESSION: No acute cardiopulmonary disease.    ONELIA GIORDANO MD   XR Cervical Spine 2/3 Views    Narrative    CERVICAL SPINE TWO TO THREE VIEWS 9/3/2019 7:49 PM     COMPARISON:  None    HISTORY: Fall/dementia      Impression    IMPRESSION: There is left convex curvature of the cervical spine  centered at the C5 level. Alignment of the cervical vertebrae is  otherwise normal. Vertebral body heights of the cervical spine appear  normal. There is no definite evidence for fracture of the cervical  spine, although evaluation is limited by left convex curvature. There  is severe degenerative endplate spurring and severe facet arthropathy  throughout the cervical spine. There is no prevertebral soft tissue  swelling.   CT Cervical Spine w/o Contrast    Narrative    CT OF THE CERVICAL SPINE WITHOUT CONTRAST   9/3/2019 7:56 PM     COMPARISON: Cervical spine x-ray series same day.    HISTORY: Cervical spine trauma, high clinical risk (NEXUS/CCR)     TECHNIQUE: Axial images of the cervical spine were acquired without  intravenous contrast. Multiplanar reformations were created.        Impression    IMPRESSION: Subtle left convex curvature of the cervical spine again  noted. Alignment of the cervical vertebrae is otherwise normal.  Vertebral body heights of the cervical spine are normal.  Craniocervical alignment is normal. There is no evidence for fracture  of the cervical spine. Partial assimilation of C1 to the occiput is  noted. There is degenerative endplate spurring and degenerative facet  arthropathy throughout cervical spine. There is no degenerative spinal  canal narrowing of the cervical spine. There is no prevertebral soft  tissue swelling.      Radiation dose for this scan was reduced using automated exposure  control, adjustment of the mA and/or kV according to patient size, or  iterative reconstruction technique   CT Head w/o Contrast    Narrative    CT OF THE HEAD WITHOUT CONTRAST 9/3/2019 7:56 PM     COMPARISON: None    HISTORY: Altered level of consciousness (LOC), unexplained.    TECHNIQUE: 5 mm thick axial CT images of the head were acquired  without IV contrast  material.    FINDINGS: There is moderate diffuse cerebral volume loss. There are  subtle patchy areas of decreased density in the cerebral white matter  bilaterally that are consistent with sequela of chronic small vessel  ischemic disease.    The ventricles and basal cisterns are within normal limits in  configuration given the degree of cerebral volume loss. There is no  midline shift. There are no extra-axial fluid collections.    No intracranial hemorrhage, mass or recent infarct.    The visualized paranasal sinuses are well-aerated. There is no  mastoiditis. There are no fractures of the visualized bones.      Impression    IMPRESSION: Diffuse cerebral volume loss and cerebral white matter  changes consistent with chronic small vessel ischemic disease. No  evidence for acute intracranial pathology.      Radiation dose for this scan was reduced using automated exposure  control, adjustment of the mA and/or kV according to patient size, or  iterative reconstruction technique

## 2019-09-04 NOTE — CONSULTS
Consult Date:  09/04/2019      ORTHOPEDIC CONSULTATION      CHIEF COMPLAINT:  Right hip femoral neck fracture.      HISTORY OF PRESENT ILLNESS:  Betito is an 78-year-old gentleman with severe Alzheimer's dementia who presented to the ED after an unwitnessed fall.  In the emergency room, they did note on a pelvis x-ray an impacted femoral neck fracture.  The family was involved in his care and elected to proceed with possible surgical intervention.  At this point, Betito is a poor historian due to his Alzheimer's dementia.  Today he is denying pain anywhere else as he lies comfortably in bed.      PAST MEDICAL HISTORY:  Significant for Alzheimer disease, BPH, hypertension, thyroid disease and recent weight loss.      PAST SURGICAL HISTORY:  Includes an appendectomy.      SOCIAL HISTORY:  He does not drink, smoke, or use smokeless tobacco.      FAMILY HISTORY:  Unknown.      IMMUNIZATIONS:  Up-to-date.      ALLERGIES:  None.      MEDICATIONS:  Please see intake as they are extensive.  Do note he is on levothyroxine, oxycodone, and vitamin D3.      REVIEW OF SYSTEMS:  The patient denies fevers, chills, chest pain, cough, dyspepsia, dysuria.  Denies any abdominal pain or other musculoskeletal complaints.      PHYSICAL EXAMINATION:     GENERAL:  Betito is lying comfortably in his bed.  He appears to be in no acute distress.   HEENT:  He is normocephalic, atraumatic.  He is breathing comfortably without distress.   ABDOMEN:  Quite thin, but soft.   EXTREMITIES:  He will not perform a straight leg raise.  Any movement to his right side will cause pain.  He will wiggle his toes to command.  His distal pulse is 1+.   SKIN:  Otherwise intact without any significant lateral bruising over the femur.        IMAGING:  X-ray was reviewed including AP pelvis which did show a subcapital fracture which is impacted.        LABORATORY DATA:  Of note, lab work showed an INR of 1.95 and a hemoglobin of 14.      IMPRESSION:  Right femoral neck  fracture.      PLAN:  I discussed with Betito the pathophysiology of this injury.  Our recommendation is a percutaneous fixation based on the impaction of the fracture.  We discussed the procedure including risks, benefits and alternatives.  These were discussed but not limited to infection, anesthetic complication, loss of fixation, as well as potential need for total hip conversion as well as osteonecrosis.  After full discussion, we talked with his daughter who proceeded to sign our consent today.  He has been cleared medically.  We will proceed to the operating room later today once the trauma room is available.         JEANIE ELDER MD             D: 2019   T: 2019   MT: WT      Name:     CHARLA SCHROEDER   MRN:      [id number]        Account:       [id number]   :      1940           Consult Date:  2019      Document: [id number]       cc: Jeanie Elder MD

## 2019-09-04 NOTE — ED NOTES
"[facility]  ED Nurse Handoff Report    ED Chief complaint: Fall      ED Diagnosis:   Final diagnoses:   Hip fracture, right, closed, initial encounter (H)   Hypokalemia   Dementia without behavioral disturbance, unspecified dementia type       Code Status: See H&P    Allergies: No Known Allergies    Activity level - Baseline/Home:  Independent  Activity Level - Current:   Total Care    Patient's Preferred language: English   Needed?: No    Isolation: No  Infection: Not Applicable  Bariatric?: No    Vital Signs:   Vitals:    09/03/19 1836 09/03/19 2020   BP: (!) 152/73 136/72   Pulse: 67    Resp: 18    Temp: 98.6  F (37  C)    TempSrc: Temporal    SpO2: 100% 99%       Cardiac Rhythm: ,        Pain level: 0-10 Pain Scale: 7    Is this patient confused?: Yes   Does this patient have a guardian?  No, friend Adele is POA         If yes, is there guardianship documents in the Epic \"Code/ACP\" activity?  N/A         Guardian Notified?  N/A  [place] - Suicide Severity Rating Scale Completed?  Yes  If yes, what color did the patient score?  White    Patient Report: Initial Complaint: right hip pain after unwitnessed fall  Focused Assessment: AOx1. AVSS. Pain with mvmt or palpation to right hip. NPO in ED. Incontinence brief in place but may void in the urinal.  Tests Performed: labs, CT, xray  Abnormal Results:   Abnormal Labs Reviewed   CBC WITH PLATELETS DIFFERENTIAL - Abnormal; Notable for the following components:       Result Value    RBC Count 4.26 (*)     All other components within normal limits   BASIC METABOLIC PANEL - Abnormal; Notable for the following components:    Sodium 131 (*)     Potassium 3.3 (*)     Glucose 105 (*)     All other components within normal limits     Xray show hip fracture  Urine pending  Treatments provided: IV pain meds, potassium    Family Comments: no one present, friend Shayla updated via phone. She is on vacation in [place] but is available via her cell phone " [phone]    OBS brochure/video discussed/provided to patient/family: N/A              Name of person given brochure if not patient: n/a              Relationship to patient: n/a    ED Medications:   Medications   potassium chloride 10 mEq in 100 mL intermittent infusion with 10 mg lidocaine (10 mEq Intravenous New Bag 9/3/19 2016)   morphine (PF) injection 4 mg (4 mg Intravenous Given 9/3/19 2018)       Drips infusing?:  No    For the majority of the shift this patient was Yellow.   Interventions performed were frequent rounding, reorientation, sitter at bedside.    Severe Sepsis OR Septic Shock Diagnosis Present: No    To be done/followed up on inpatient unit:  continue to monitor    ED NURSE PHONE NUMBER: *[phone]

## 2019-09-04 NOTE — PROGRESS NOTES
[facility]    Hospitalist Progress Note      Assessment & Plan   78-year-old male with history of Alzheimer's dementia, resident of a memory care unit, history of hypertension, hyperlipidemia, BPH, hypothyroidism who was sent to the ER because of unwitnessed fall and right hip pain.     1.  Right femoral neck fracture s/p fall  - Xray pelvis/right hip- Right femoral neck fracture with mild impaction and slight displacement.  - Ortho consult  - npo, iv fluids, bed rest, Carlos cath  - pain management   - DVT/px as per Ortho, PCD for now  - minimize narcotics, sedative given his baseline dementia        2.  Unwitnessed fall:    - Reason for the fall is not clear, apparently, no LOC  - CT head is negative for any acute abnormality;  CT C spine negative for any acute fracture; CXR negative for any pneumothorax or rib fracture.    - fall precautions    3. Hypokalemia  - K 3.3  - K replacement protocol  - K today 4.3    4. Hyponatremia  - Na 131  - urine Na, urine osmolality  - He is on citalopram and that may be the reason for his mild hyponatremia.  - on NS at 100cc/h now  - BMP in am    5. H/o HTN  - not on meds at home  - here BP intermittently elevated   - monitor BP  - Hydralazine iv prn    6.  Alzheimer's dementia:    - continue PTA Remeron at bedtime  - PTA also on Seroquel 25 mg po daily prn for agitation; will change Seroquel to 25 mg po BID prn  - high risk for delirium/encephalopathy  - minimize narcotics, sedatives      7. Hypothyroidism  - continue PTA levothyroxine    8.  BPH  - continue PTA Flomax    DVT Prophylaxis: Pneumatic Compression Devices  Code Status: DNR/DNI  Expected discharge: 3-4 days, recommended to transitional care unit  Pending postop course.    Marcela Patel MD    Interval History   Confused, demented, denies pain, watching TV; discussed with RN, plan for OR today.    -Data reviewed today: I reviewed all new labs and imaging results over the last 24 hours. I  personally reviewed the head CT image(s) showing no acute pathology and the Xray pelvis/right hip image(s) showing right femoral neck fracture.    Physical Exam   Temp: 98.5  F (36.9  C) Temp src: Axillary BP: (!) 160/83 Pulse: 73   Resp: 16 SpO2: 96 % O2 Device: None (Room air)    Vitals:    09/03/19 2205   Weight: 61.4 kg (135 lb 6.4 oz)     Vital Signs with Ranges  Temp:  [98  F (36.7  C)-98.6  F (37  C)] 98.5  F (36.9  C)  Pulse:  [67-73] 73  Resp:  [16-18] 16  BP: (120-160)/(66-83) 160/83  SpO2:  [96 %-100 %] 96 %  No intake/output data recorded.    Constitutional: NAD  Respiratory: Bilateral air entry, no wheezing, no rales, no crackles  Cardiovascular: S1S2, RRR, no murmurs, no rubs  GI: abd- soft, nonT, nonD, BS present  Skin/Integumen: no rashes, no cyanosis  Neuro- awake, alert, demented      Medications     lactated ringers       sodium chloride 100 mL/hr at 09/03/19 2232       citalopram  10 mg Oral Daily     fluticasone  1 spray Both Nostrils Daily     latanoprost  1 drop Both Eyes At Bedtime     levothyroxine  100 mcg Oral QAM AC     mirtazapine  15 mg Oral At Bedtime     multivitamin w/minerals  1 tablet Oral At Bedtime     sodium chloride (PF)  3 mL Intracatheter Q8H     tamsulosin  0.4 mg Oral At Bedtime     vitamin D3  2,000 Units Oral Daily       Data   Recent Labs   Lab 09/04/19  0555 09/03/19  1912   WBC  --  4.3   HGB  --  14.0   MCV  --  95   PLT  --  185   INR  --  0.95   * 131*   POTASSIUM 4.3 3.3*   CHLORIDE 101 97   CO2 25 25   BUN 15 13   CR 0.89 0.84   ANIONGAP 5 9   RONALD 8.4* 9.2   * 105*       Recent Results (from the past 24 hour(s))   XR Pelvis w Hip Right 1 View    Narrative    PELVIS AND HIP RIGHT ONE VIEW   9/3/2019 7:45 PM     HISTORY:  Pain    FINDINGS: Degenerative change of the lower lumbar spine.      Impression    IMPRESSION: Right femoral neck fracture with mild impaction and slight  displacement. Small comminuted fragment laterally.    JULIUS WINTER MD   XR Chest  1 View    Narrative    CHEST ONE VIEW   9/3/2019 7:48 PM     HISTORY: Preoperative    COMPARISON: 6/6/2018.      Impression    IMPRESSION: No acute cardiopulmonary disease.    ONELIA GIORDANO MD   XR Cervical Spine 2/3 Views    Narrative    CERVICAL SPINE TWO TO THREE VIEWS 9/3/2019 7:49 PM     COMPARISON: None    HISTORY: Fall/dementia      Impression    IMPRESSION: There is left convex curvature of the cervical spine  centered at the C5 level. Alignment of the cervical vertebrae is  otherwise normal. Vertebral body heights of the cervical spine appear  normal. There is no definite evidence for fracture of the cervical  spine, although evaluation is limited by left convex curvature. There  is severe degenerative endplate spurring and severe facet arthropathy  throughout the cervical spine. There is no prevertebral soft tissue  swelling.    ABIEL SKELTON MD   CT Cervical Spine w/o Contrast    Narrative    CT OF THE CERVICAL SPINE WITHOUT CONTRAST   9/3/2019 7:56 PM     COMPARISON: Cervical spine x-ray series same day.    HISTORY: Cervical spine trauma, high clinical risk (NEXUS/CCR)     TECHNIQUE: Axial images of the cervical spine were acquired without  intravenous contrast. Multiplanar reformations were created.        Impression    IMPRESSION: Subtle left convex curvature of the cervical spine again  noted. Alignment of the cervical vertebrae is otherwise normal.  Vertebral body heights of the cervical spine are normal.  Craniocervical alignment is normal. There is no evidence for fracture  of the cervical spine. Partial assimilation of C1 to the occiput is  noted. There is degenerative endplate spurring and degenerative facet  arthropathy throughout cervical spine. There is no degenerative spinal  canal narrowing of the cervical spine. There is no prevertebral soft  tissue swelling.      Radiation dose for this scan was reduced using automated exposure  control, adjustment of the mA and/or kV according to patient  size, or  iterative reconstruction technique    ABIEL SKELTON MD   CT Head w/o Contrast    Narrative    CT OF THE HEAD WITHOUT CONTRAST 9/3/2019 7:56 PM     COMPARISON: None    HISTORY: Altered level of consciousness (LOC), unexplained.    TECHNIQUE: 5 mm thick axial CT images of the head were acquired  without IV contrast material.    FINDINGS: There is moderate diffuse cerebral volume loss. There are  subtle patchy areas of decreased density in the cerebral white matter  bilaterally that are consistent with sequela of chronic small vessel  ischemic disease.    The ventricles and basal cisterns are within normal limits in  configuration given the degree of cerebral volume loss. There is no  midline shift. There are no extra-axial fluid collections.    No intracranial hemorrhage, mass or recent infarct.    The visualized paranasal sinuses are well-aerated. There is no  mastoiditis. There are no fractures of the visualized bones.      Impression    IMPRESSION: Diffuse cerebral volume loss and cerebral white matter  changes consistent with chronic small vessel ischemic disease. No  evidence for acute intracranial pathology.      Radiation dose for this scan was reduced using automated exposure  control, adjustment of the mA and/or kV according to patient size, or  iterative reconstruction technique    ABIEL SKELTON MD

## 2019-09-04 NOTE — ANESTHESIA POSTPROCEDURE EVALUATION
Patient: Elie Marcano    Procedure(s):  CLOSED REDUCTION RIGHT HIP, WITH PERCUTANEOUS SCREWS RIGHT HIP    Diagnosis:HIP FRACTURE.  Diagnosis Additional Information: No value filed.    Anesthesia Type:  No value filed.    Note:  Anesthesia Post Evaluation    Patient location during evaluation: PACU  Patient participation: Able to fully participate in evaluation  Level of consciousness: awake, awake and alert and responsive to verbal stimuli  Pain management: adequate  Airway patency: patent  Cardiovascular status: acceptable  Respiratory status: acceptable  Hydration status: acceptable  PONV: none     Anesthetic complications: None          Last vitals:  Vitals:    09/04/19 1500 09/04/19 1510 09/04/19 1523   BP: 131/62 131/64    Pulse:  59    Resp: 11 15    Temp: 36.1  C (97  F) 36.3  C (97.3  F)    SpO2: 98% 97% 97%         Electronically Signed By: Fabi Li  September 4, 2019  3:29 PM

## 2019-09-04 NOTE — PLAN OF CARE
Arrived at 1530 from PACU. VSS, CMS good. Tylenol given for pain. Calm now but get agitated with repositioning. Carlos patent and draining adequate amount. Confused, orient to self only. Sitter at bedside.

## 2019-09-04 NOTE — PLAN OF CARE
Pt confused and restless. Disoriented x3. Tylenol and Seroquel given. VSS on ra. Bedrest. Incontinent. IVF NS @ 100 ml/h. Sitter at bedside. Will continue to monitor.

## 2019-09-04 NOTE — OP NOTE
Procedure Date: 09/04/2019      PREOPERATIVE DIAGNOSIS:  Right hip valgus impacted femoral neck fracture.      POSTOPERATIVE DIAGNOSIS:  Right hip valgus impacted femoral neck fracture.      PROCEDURE PERFORMED:  Closed reduction and percutaneous pinning, right hip fracture.      SURGEON:  Lats Frank MD.      ASSISTANT:  Bo Akhtar PA-C.      ANESTHESIA:  General.      ESTIMATED BLOOD LOSS:  Less than 50 mL.      DRAINS:  None.      COMPLICATIONS:  None apparent.      INDICATIONS FOR PROCEDURE:  Betito is an 78-year-old gentleman with end-stage Alzheimer disease.  He had a fall unwitnessed at his care facility and had pain.  X-ray revealed a femoral neck fracture.  He was cleared medically for surgery today.  His daughter, Shayla, gave us consent to proceed.  We did discuss with her the risks, benefits and alternatives of this procedure.  These risks included but are not limited to anesthetic complications including heart attack, stroke, even death, infection, blood clots, failed fixation, osteonecrosis, potential need for conversion or bipolar hip arthroplasty if this were to fail.  We also discussed the potential need for him to be able to follow postoperative instructions for the best outcome and rehabilitation.  After full discussion, we proceeded.      DESCRIPTION OF PROCEDURE:  Betito was brought to the operating room.  General endotracheal anesthesia was performed, and a Carlos was placed.  He was transferred to the fracture bed, and prepped and draped in the usual sterile fashion for femoral neck surgery.  We then obtained an intraoperative x-ray including AP and lateral, which showed the impacted fracture to be in reasonable position for pinning operation.  With that completed, he was prepped and draped in the usual sterile fashion.  Timeout confirmed the appropriate limb.        With that completed, we then made a roughly 5 cm incision over the lateral aspect of his hip with sharp dissection down through the IT  band and some of the vastus lateralis.  We used a [place] to debride some of the tissue off the side of the bone.  Under fluoroscopic guidance, we placed our initial pin inferiorly just above the lesser trochanter, placing it into the inferior aspect of the femoral head.  We confirmed on x-ray that we got it in a nice center-center position, and we were very pleased and it only took one attempt to get to this point.  We then used our 125-degree guide to place 2 more parallel screws superiorly, one more anterior and one more inferiorly.  Again, we did evaluate under fluoroscopy, placed those pins to make sure we were in a near parallel position.  Once it was confirmed that we had no intraarticular hardware, we did measure the size of the screws.  We drilled the outer cortex and placed the 3 screws over the wires into appropriate position making sure we stayed within the femoral head and did not get into the joint.  We were pleased with this.  The pins were removed.  X-rays were taken, including AP and lateral views, which showed the hardware to be in good position with parallel pins and no significant intraarticular hardware.  With that completed, the wound was copiously irrigated.  We had layered closure.  Sterile dressings were applied.  He was brought to the PACU in stable condition.  Needle and sponge counts correct.      PLAN:  The patient will be nonweightbearing for the initial 2 weeks until we get an x-ray to make sure things are holding in good position.  I do have concerns because of his Alzheimer disease that he will be able to comply with protected weightbearing.  He will be started on Lovenox and Pneumoboots for DVT prophylaxis.  Ancef 1 gram was given within an hour of the procedure and this will be discontinued within 24 hours.  He will be admitted for PT, OT and pain control.         JEANIE ELDER MD             D: 09/04/2019   T: 09/04/2019   MT:       Name:     CHARLA SCHROEDER   MRN:       [id number]        Account:        [id number]   :      1940           Procedure Date: 2019      Document: [id number]       cc: Last MCCLELLAND, CNP

## 2019-09-04 NOTE — H&P
"Admitted:     09/03/2019      HISTORY OF PRESENT ILLNESS:  This is an 78-year-old male with history of Alzheimer's dementia, resident of a memory care unit, history of hypertension, hyperlipidemia, BPH, hypothyroidism who was sent to the ER because of unwitnessed fall and right hip pain.      The patient is a very poor historian, has baseline dementia, unable to provide any history at this time.  Most of the history is obtained from the medical records, talking to the ER physician.  The patient had an unwitnessed fall in the hallway, not sure that he was using his walker or not, found on the floor in the hallway.  He is able to sit by himself but unable to stand up.  When they tried to stand him up, he had pain in the right hip.  Subsequently, he was sent to the ER.      There is no obvious history of any vomiting or passing out.  The patient was not found unconscious.  No history of fever, chills, headache, dizziness at this time.  On review of systems, the patient is unable to give much history.  He said no to most of the questions and said \"I'm doing fine\" and denies even having any pain at this time.      In the ER, he had an extensive workup done for the fall including CT scan of the head, CT of the cervical spine, an x-ray of pelvis and chest x-ray.  CT pelvis does show impacted fracture of the right hip, so the Hospitalist is consulted to admit the patient.      ASSESSMENT AND PLAN:   1.  Right impacted femoral neck fracture:  The patient has an impacted right femoral neck fracture with mild displacement.  At this time, we will keep him on bed rest, nonweightbearing on the right lower extremity, keep n.p.o., IV fluids, pain medication, oxycodone, Tylenol and IV morphine.  Consult Orthopedic to evaluate for possible surgery.  The patient has a court-appointed power of .  Currently in [place].  ER physician did talk with her and she wanted him to have the surgery to fix his hip fracture.     2.  " Unwitnessed fall:  Reason for the fall is uncertain.  Apparently, no unconsciousness.  CT scan of the head is negative for any acute abnormality.  CT cervical spine negative for any acute fracture.  Chest x-ray negative for any pneumothorax or rib fracture.  The patient is conscious and alert at this time.   3.  Preoperative evaluation: This is an 78-year-old male with a history of hypertension, hyperlipidemia, Alzheimer dementia.  Clinically he is stable.  Comorbidities are well controlled and unmodifiable at this time.  His blood pressure is good.  His chest x-ray, no congestive changes or infiltrate.  EKG does not show any acute ischemic changes.  On labs, has some mild hypokalemia, mild hyponatremia.  I think we can correct the potassium overnight.  Sodium of 130 or above will be okay to go for the intended surgery.  Only thing we need to watch out postoperatively is postoperative delirium or encephalopathy.  High risk of that as the patient had severe dementia.   4.  Alzheimer's dementia:  The patient has quite advanced dementia.  He is on Remeron and Seroquel.  We will continue with that.   5.  Hypokalemia:  Replace the potassium.  Overnight will give him 40 of p.o. potassium and check again his BMP in the morning.   6.  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  We will check urine sodium and urine osmolality.  He is on citalopram and that may be the reason for his mild hyponatremia.   7.  Hypothyroidism, on levothyroxine:  We will continue with that.   8.  History of benign prostatic hypertrophy, on Flomax:  I will continue with that.   9.  Deep venous thrombosis prophylaxis with SCDs prior to surgery and then as per Orthopedic Surgery after the fracture repair.      CODE STATUS:  According to his records from the memory care unit, he is DNR/DNI.      The case was discussed with the ER physician and the nursing staff taking care of the patient.         KULDEEP HERNANDEZ MD             D: 09/03/2019   T:  2019   MT:       Name:     CHARLA SCHROEDER   MRN:      [id number]        Account:      [id number]   :      1940        Admitted:     2019                   Document: [id number]       cc: Debbie MCCLELLAND, CNP

## 2019-09-05 NOTE — PLAN OF CARE
Discharge Planner PT   Patient plan for discharge: Not stated  Current status: Orders received, eval completed, treatment initiated. Pt is a78-year-old male with history of Alzheimer's dementia, resident of a memory care unit, history of hypertension, hyperlipidemia, BPH, hypothyroidism who was sent to the ER because of unwitnessed fall and right hip pain. Pt is very pleasant, but often confused during session. Agreeable to PT. Pt educated on current status and why he was in the hospital. Supine>sit with MinAx2. Sit<>stand and stand pivot transfer with FWW and mod Ax3 with one person assisting R LE to maintain WB restrictions. Pt requires frequent cues to adhere to WB status. Pt sitting in chair at end of session with sitter present. PT spoke with DOLORES regarding WB restrictions requesting pt be allowed to weight bear during transfers to prevent further decline and decrease level of A. It is also difficult for pt to comprehend WB status. DOLORES stated she would contact the surgeon regarding this matter and update WB status if appropriate.   Screened for OT needs and will defer to TCU. Discussed with OT.  Barriers to return to prior living situation: Non WB status, impaired balance, decreased activity tolerance, decreased strength, fall risk, current level of A.  Recommendations for discharge: TCU  Rationale for recommendations: Pt will benefit from skilled PT services in TCU setting to address the above limitations and return to baseline functional mobility.       Entered by: Price Burgos 09/05/2019 12:47 PM

## 2019-09-05 NOTE — PROVIDER NOTIFICATION
Unable to void after reddy d/c'd at 6am. Bladder scanned at 1100 for 535 and was symptomatic. Writer tried x 2 to straight cath with no urine return. Resource nurse able to place reddy,  1100 ml out. Notified Dr. Reyes regarding hard straight cath. Ordered to leave reddy in until tomorrow am. Also updated about fluid filled sac in left eye. MD will stop by to look at it some time today.

## 2019-09-05 NOTE — PLAN OF CARE
Discharge Planner OT     Patient plan for discharge: unknown    Current status: Orders received, chart reviewed. Per session with PT, patient has baseline dementia, required Ax1-2 for transfers, unable to ambulate and unable to adhere to non-WB precautions. Patient is a resident of a memory care unit. PT rec TCU.    Barriers to return to prior living situation: defer to PT     Recommendations for discharge: defer to PT    Rationale for recommendations: OT deferring eval to TCU at this time as LOS is expected to be short. Order completed.       Entered by: Marylu Manley 09/05/2019 12:20 PM

## 2019-09-05 NOTE — PROGRESS NOTES
[facility]    Hospitalist Progress Note      Assessment & Plan   78-year-old male with history of Alzheimer's dementia, resident of a memory care unit, history of hypertension, hyperlipidemia, BPH, hypothyroidism who was sent to the ER because of unwitnessed fall and right hip pain.     1.  Right femoral neck fracture s/p fall  - Xray pelvis/right hip- Right femoral neck fracture with mild impaction and slight displacement.  -Status post surgery on 9/4  -Advance diet as tolerated, will stop IV fluids, had to replace Carlos for overnight today due to urinary retention.  - pain management   - DVT/px as per Ortho, PCD for now  - minimize narcotics, sedative given his baseline dementia        2.  Unwitnessed fall:    - Reason for the fall is not clear, apparently, no LOC  - CT head is negative for any acute abnormality;  CT C spine negative for any acute fracture; CXR negative for any pneumothorax or rib fracture.    - fall precautions    3. Hypokalemia  -Replace as per protocol    4. Hyponatremia  -Mild hyponatremia noted, possibly hypovolemic, continue IV hydration.    5. H/o HTN  - not on meds at home  - here BP intermittently elevated   - monitor BP  - Hydralazine iv prn    6.  Alzheimer's dementia:    - continue PTA Remeron at bedtime  - PTA also on Seroquel 25 mg po daily prn for agitation; will change Seroquel to 25 mg po BID prn  - high risk for delirium/encephalopathy  - minimize narcotics, sedatives      7. Hypothyroidism  - continue PTA levothyroxine    8.  BPH  - continue PTA Flomax    DVT Prophylaxis: Pneumatic Compression Devices  Code Status: DNR/DNI  Expected discharge: 1 to 2 days to TCU as per Ortho recommendations    Teetee Reyes MD    Interval History   Resting calmly, he is confused, had a urinary retention after Carlos removed today morning, plan to continue Carlos overnight today.  Can remove in the morning and do voiding trials again . he denies any pain.    -Data reviewed today:  I reviewed all new labs and imaging results over the last 24 hours. I personally reviewed the head CT image(s) showing no acute pathology and the Xray pelvis/right hip image(s) showing right femoral neck fracture.    Physical Exam   Temp: 97.4  F (36.3  C) Temp src: Axillary BP: 135/68 Pulse: 75 Heart Rate: 65 Resp: 14 SpO2: 99 % O2 Device: None (Room air) Oxygen Delivery: 8 LPM  Vitals:    09/03/19 2205   Weight: 61.4 kg (135 lb 6.4 oz)     Vital Signs with Ranges  Temp:  [95.5  F (35.3  C)-98.6  F (37  C)] 97.4  F (36.3  C)  Pulse:  [54-75] 75  Heart Rate:  [55-76] 65  Resp:  [10-27] 14  BP: (110-168)/(50-81) 135/68  SpO2:  [97 %-100 %] 99 %  I/O last 3 completed shifts:  In: 1360 [P.O.:360; I.V.:1000]  Out: 3175 [Urine:3175]    Constitutional: NAD  Respiratory: Bilateral air entry, no wheezing, no rales, no crackles  Cardiovascular: S1S2, RRR, no murmurs, no rubs  GI: abd- soft, nonT, nonD, BS present  Skin/Integumen: no rashes, no cyanosis, status post right hip surgery  Neuro- awake, alert, demented      Medications     dextrose 5% and 0.45% NaCl + KCl 20 mEq/L 125 mL/hr at 09/05/19 0046     sodium chloride 100 mL/hr at 09/03/19 2232       acetaminophen  975 mg Oral Q8H     citalopram  10 mg Oral Daily     enoxaparin  40 mg Subcutaneous Q24H     fluticasone  1 spray Both Nostrils Daily     latanoprost  1 drop Both Eyes At Bedtime     levothyroxine  100 mcg Oral QAM AC     mirtazapine  15 mg Oral At Bedtime     multivitamin w/minerals  1 tablet Oral At Bedtime     senna-docusate  1 tablet Oral BID    Or     senna-docusate  2 tablet Oral BID     sodium chloride (PF)  3 mL Intracatheter Q8H     sodium chloride (PF)  3 mL Intracatheter Q8H     tamsulosin  0.4 mg Oral At Bedtime     vitamin D3  2,000 Units Oral Daily       Data   Recent Labs   Lab 09/05/19  0556 09/04/19 1921 09/04/19  0555 09/03/19 1912   WBC  --   --   --  4.3   HGB 11.8*  --   --  14.0   MCV  --   --   --  95   PLT  --  138*  --  185   INR  --    --   --  0.95   *  --  131* 131*   POTASSIUM 4.1  --  4.3 3.3*   CHLORIDE 101  --  101 97   CO2 27  --  25 25   BUN 10  --  15 13   CR 0.90 0.71 0.89 0.84   ANIONGAP 4  --  5 9   RONALD 8.4*  --  8.4* 9.2   *  --  132* 105*       Recent Results (from the past 24 hour(s))   XR Surgery WHITNEY L/T 5 Min Fluoro w Stills    Narrative    SURGERY C-ARM FLUOROSCOPY LESS THAN FIVE MINUTES WITH STILLS  9/4/2019  1:45 PM     HISTORY: ORIF right hip.    COMPARISON: None.      Impression    IMPRESSION:  Three intraoperative radiographs submitted for review  with intertrochanteric screws through the proximal femur. Basicervical  fracture through the proximal femur is partially visible, though  incomplete given these are intraoperative radiographs. Total  fluoroscopic time 0.8 minutes.    MARZENA DUARTE MD

## 2019-09-05 NOTE — PLAN OF CARE
Patient pleasantly confused . VSS on RA. Iv infusing. Denies pain . Scheduled tylenol . Take pills ok. Regular diet. Aquacel dressing CDI. CMS intact. Redness & blanchable ,covered with mepilex. Sitter by Bedside. Will continue monitoring.

## 2019-09-05 NOTE — PROGRESS NOTES
Orthopedic Surgery  Elie Marcano  2019  Admit Date:  9/3/2019  POD 1 Day Post-Op  S/P Procedure(s):  CLOSED REDUCTION RIGHT HIP, WITH PERCUTANEOUS SCREWS RIGHT HIP    Patient resting comfortably in bed.    Pain controlled.  Tolerating oral intake.    Denies nausea or vomiting  Denies chest pain or shortness of breath  No events overnight.     Alert and orient to person  Vital Sign Ranges  Temperature Temp  Av.1  F (36.2  C)  Min: 95.5  F (35.3  C)  Max: 98.6  F (37  C)   Blood pressure Systolic (24hrs), Av , Min:110 , Max:168        Diastolic (24hrs), Av, Min:50, Max:81      Pulse Pulse  Av.9  Min: 57  Max: 75   Respirations Resp  Av.7  Min: 10  Max: 27   Pulse oximetry SpO2  Av.6 %  Min: 97 %  Max: 100 %       Dressing is clean, dry, and intact.   Minimal erythema of the surrounding skin.   Bilateral calves are soft, non-tender.  Bilateral lower extremity is NVI.  Sensation intact bilateral lower extremities  5/5 motor with resisted dorsi and plantar flexion bilaterally  +Dp pulse    Labs:  Recent Labs   Lab Test 19  0556 19  0555 19   POTASSIUM 4.1 4.3 3.3*     Recent Labs   Lab Test 19  0556 19   HGB 11.8* 14.0 13.2*     Recent Labs   Lab Test 19   INR 0.95     Recent Labs   Lab Test 19  1921 19   * 185 167       A/P  1. S/p right fem neck fracture CRPP   Continue Lovenox for DVT prophylaxis.     Mobilize with PT/OT    Non-WB right LE, bed to chair transfers.     Continue current pain regiment.   Leave dressing intact    2. Disposition   Anticipate d/c to TCU based on progress with PT and medical clearance - likely 2 more days.    Carmen Abraham PA-C

## 2019-09-05 NOTE — PROGRESS NOTES
09/05/19 1100   Quick Adds   Type of Visit Initial PT Evaluation   Living Environment   Lives With alone   Living Arrangements assisted living  (memory care)   Home Accessibility no concerns   Transportation Anticipated family or friend will provide   Living Environment Comment Living environment per chart review, pt states he lives with family but is a poor historian.   Self-Care   Usual Activity Tolerance moderate   Current Activity Tolerance poor   Regular Exercise Yes   Activity/Exercise Type walking   Exercise Amount/Frequency 3-5 times/wk   Activity/Exercise/Self-Care Comment Noted in chart that pt uses AD, but pt states he does not. Unsure of what pt uses at baseline due to pt being poor historian.   Functional Level Prior   Ambulation 1-->assistive equipment   Transferring 1-->assistive equipment   Fall history within last six months yes   Number of times patient has fallen within last six months 1   Which of the above functional risks had a recent onset or change? ambulation;fall history   General Information   Onset of Illness/Injury or Date of Surgery - Date 09/03/19   Referring Physician Last Frank MD   Pertinent History of Current Problem (include personal factors and/or comorbidities that impact the POC) 78-year-old male with history of Alzheimer's dementia, resident of a memory care unit, history of hypertension, hyperlipidemia, BPH, hypothyroidism who was sent to the ER because of unwitnessed fall and right hip pain.    Precautions/Limitations fall precautions   Weight-Bearing Status - LLE full weight-bearing   Weight-Bearing Status - RLE nonweight-bearing   General Observations Supine in bed   Cognitive Status Examination   Orientation person  (self only)   Level of Consciousness alert   Follows Commands and Answers Questions 50% of the time   Personal Safety and Judgment impaired   Cognitive Comment Pt has history of Alzheimer's dementia.   Pain Assessment   Patient Currently in Pain No  "  Integumentary/Edema   Integumentary/Edema Comments Surgical site CDI   Posture    Posture Forward head position;Protracted shoulders;Kyphosis   Range of Motion (ROM)   ROM Comment L LE WFL, R LE ROM limited by pain currently   Strength   Strength Comments Pt able to perform SLR, heel slides, and AP B, noted to be at least 3/5 strength B. Not formally tested   Bed Mobility   Bed Mobility Comments Supine<>sit Min Ax1   Transfer Skills   Transfer Comments Sit<>stand ModAx3. One person assisting with R LE to maintain WB precautions.   Gait   Gait Comments Not today   Balance   Balance Comments Pt SLS on L side with use of FWW and MinAx2 due to WB restrictions.   Sensory Examination   Sensory Perception Comments Unable to determine due to cognition.   General Therapy Interventions   Planned Therapy Interventions balance training;bed mobility training;gait training;neuromuscular re-education;strengthening;transfer training   Clinical Impression   Criteria for Skilled Therapeutic Intervention yes, treatment indicated   PT Diagnosis Impaired functional mobility   Influenced by the following impairments Decreased strength, impaired balance, pain, cognition, WB restrictions.   Functional limitations due to impairments Increased dependence for bed mobility, transfers, gait.   Clinical Presentation Stable/Uncomplicated   Clinical Presentation Rationale Medically stable   Clinical Decision Making (Complexity) Low complexity   Therapy Frequency Daily   Predicted Duration of Therapy Intervention (days/wks) 3 days   Anticipated Equipment Needs at Discharge front wheeled walker  (Unkown if pt currently has one, poor historian)   Anticipated Discharge Disposition Transitional Care Facility   Risk & Benefits of therapy have been explained Yes   Patient, Family & other staff in agreement with plan of care Yes   [facility] AM-PAC TM \"6 Clicks\"   2016, Trustees of [facility], under license to [employer].  All rights " "reserved.   6 Clicks Short Forms Basic Mobility Inpatient Short Form   [facility] AM-PAC  \"6 Clicks\" V.2 Basic Mobility Inpatient Short Form   1. Turning from your back to your side while in a flat bed without using bedrails? 3 - A Little   2. Moving from lying on your back to sitting on the side of a flat bed without using bedrails? 3 - A Little   3. Moving to and from a bed to a chair (including a wheelchair)? 2 - A Lot   4. Standing up from a chair using your arms (e.g., wheelchair, or bedside chair)? 2 - A Lot   5. To walk in hospital room? 1 - Total   6. Climbing 3-5 steps with a railing? 1 - Total   Basic Mobility Raw Score (Score out of 24.Lower scores equate to lower levels of function) 12   Total Evaluation Time   Total Evaluation Time (Minutes) 10     "

## 2019-09-05 NOTE — PLAN OF CARE
BP slightly elevated. Other vitals stable. CMS intact. Up with 2 and walker. Pain managed with tylenol. Restless and wants to leave and go to [employer]. Sitter  at bedside. Order to keep reddy in overnight for retention. Dressing c/d/I. Blanchable redness coccyx, mepilex applied. Confused, orient to self only. Will continue to monitor.

## 2019-09-06 NOTE — PROGRESS NOTES
Orthopedic Surgery  Elie Marcano  2019  Admit Date:  9/3/2019  POD 2 Days Post-Op  S/P Procedure(s):  CLOSED REDUCTION RIGHT HIP, WITH PERCUTANEOUS SCREWS RIGHT HIP    Patient resting comfortably in bed.    Pain controlled.  Tolerating oral intake.    Denies nausea or vomiting  Denies chest pain or shortness of breath  No events overnight.     Alert and orient to person - pleasantly confused  Vital Sign Ranges  Temperature Temp  Av.9  F (36.6  C)  Min: 97.6  F (36.4  C)  Max: 98.1  F (36.7  C)   Blood pressure Systolic (24hrs), Av , Min:121 , Max:164        Diastolic (24hrs), Av, Min:66, Max:83      Pulse Pulse  Av.3  Min: 63  Max: 71   Respirations Resp  Av  Min: 14  Max: 14   Pulse oximetry SpO2  Av.2 %  Min: 98 %  Max: 99 %       Dressing is clean, dry, and intact.   Minimal erythema of the surrounding skin.   Bilateral calves are soft, non-tender.  Bilateral lower extremity is NVI.  Sensation intact bilateral lower extremities  5/5 motor with resisted dorsi and plantar flexion bilaterally  +Dp pulse    Labs:  Recent Labs   Lab Test 19  0556 19  0555 19   POTASSIUM 4.1 4.3 3.3*     Recent Labs   Lab Test 19  0601 19  0556 19   HGB 11.8* 11.8* 14.0     Recent Labs   Lab Test 19   INR 0.95     Recent Labs   Lab Test 19  0556 19  1921 19   * 138* 185       A/P  1. S/p right femoral neck fracture CRPP   Continue Lovenox for DVT prophylaxis.     Mobilize with PT/OT    WB for transfers only, otherwise non-WB right LE with walker.     Continue current pain regiment.   Leave dressing intact    2. Disposition   Anticipate d/c to TCU based on progress with PT, medical coverage and placement.    Carmen Abraham PA-C

## 2019-09-06 NOTE — PLAN OF CARE
A&O x1, Alzheimer's. CMS intact. Regular diet, ate 50% of dinner. VSS. Dressing clean and dry with scant old drainage. Up with assist of 2, walker and belt to chair, unable to follow directions well for mobility. Denies pain @ rest, scheduled tylenol. Shayla, SONIA, stopped by and said she will back to visit tomorrow morning.

## 2019-09-06 NOTE — PROVIDER NOTIFICATION
Text paged hosptalist about BladderScan of 758, reddy placed - asked MD if it should get pulled or stay. Patient voided about 100 ml but cannot get any more urine out.     1535: Spoke with MD on the phone, she put orders in for Reddy to stay in place due to retention.

## 2019-09-06 NOTE — PLAN OF CARE
Discharge Planner PT   Patient plan for discharge: Not stated  Current status: Pt WB status updated to WB for transfers only by MD. Needs to be NWB if amb.  Pt sitting in chair and agreeable to PT session. Sit<>stand x3 with MinAx2 and FWW. Repeated cues to maintain reduce amount of WB on R LE. Chair<>bed transfer with FWW and minAx2. Participated in seated marching 2x 1 min each. Pt required repeated cues for technique and WB status. Pt left in chair with sitter present.   Barriers to return to prior living situation: Non WB status, impaired balance, decreased activity tolerance, decreased strength, fall risk, current level of A.   Recommendations for discharge: TCU  Rationale for recommendations: Pt will benefit from skilled PT services in TCU setting to address the above limitations and return to baseline functional mobility.       Entered by: Price Burgos 09/06/2019 2:00 PM

## 2019-09-06 NOTE — PLAN OF CARE
Pt disoriented x4, VSS on RA , IVSL, up with 2, voiding in BSC, WBAT on LLE, WB on LRE only during transfers, regular diet, scheduled tylenol for pain, dressing has scant dried drainage, CMS intact, sitter at bedside, continue to monitor.

## 2019-09-06 NOTE — PROGRESS NOTES
[facility]    Hospitalist Progress Note      Assessment & Plan   78-year-old male with history of Alzheimer's dementia, resident of a memory care unit, history of hypertension, hyperlipidemia, BPH, hypothyroidism who was sent to the ER because of unwitnessed fall and right hip pain.     1.  Right femoral neck fracture s/p fall  - Xray pelvis/right hip- Right femoral neck fracture with mild impaction and slight displacement.  -Status post surgery on 9/4  -Advance diet as tolerated, remove Carlos today and try voiding trials.  - pain management   - DVT/px as per Ortho, PCD for now  -As per Ortho WB for transfers only, otherwise non-WB right LE with walker.   - minimize narcotics, sedative given his baseline dementia  -UA is abnormal but urine culture is negative.        2.  Unwitnessed fall:    - Reason for the fall is not clear, apparently, no LOC  - CT head is negative for any acute abnormality;  CT C spine negative for any acute fracture; CXR negative for any pneumothorax or rib fracture.    - fall precautions    3. Hypokalemia  -Replace as per protocol    4. Hyponatremia  -Mild hyponatremia noted, possibly hypovolemic, continue IV hydration.    5. H/o HTN  - not on meds at home  - here BP intermittently elevated   - monitor BP  - Hydralazine iv prn    6.  Alzheimer's dementia:    - continue PTA Remeron at bedtime  - PTA also on Seroquel 25 mg po daily prn for agitation; will change Seroquel to 25 mg po BID prn  - high risk for delirium/encephalopathy  - minimize narcotics, sedatives      7. Hypothyroidism  - continue PTA levothyroxine    8.  BPH  - continue PTA Flomax  9.Acute blood loss anemia  -Hemoglobin dropped from 14-11.8, but stable.  DVT Prophylaxis: Pneumatic Compression Devices  Code Status: DNR/DNI  Expected discharge: 1 to 2 days to TCU as per Ortho recommendations    Teetee Reyes MD    Interval History   Patient is occasionally confused, he denies any pain, tolerating diet well,  plan to remove Carlos today, he had urinary retention when we took the Carlos out yesterday.  No new issues noted.    -Data reviewed today: I reviewed all new labs and imaging results over the last 24 hours. I personally reviewed the head CT image(s) showing no acute pathology and the Xray pelvis/right hip image(s) showing right femoral neck fracture.    Physical Exam   Temp: 98.1  F (36.7  C) Temp src: Oral BP: 135/72 Pulse: 68 Heart Rate: 68 Resp: 14 SpO2: 96 % O2 Device: None (Room air)    Vitals:    09/03/19 2205 09/06/19 0727   Weight: 61.4 kg (135 lb 6.4 oz) 61.1 kg (134 lb 11.2 oz)     Vital Signs with Ranges  Temp:  [97.6  F (36.4  C)-98.1  F (36.7  C)] 98.1  F (36.7  C)  Pulse:  [63-71] 68  Heart Rate:  [63-84] 68  Resp:  [14] 14  BP: (121-140)/(66-75) 135/72  SpO2:  [96 %-99 %] 96 %  I/O last 3 completed shifts:  In: 480 [P.O.:480]  Out: 4600 [Urine:4600]    Constitutional: NAD  Respiratory: Bilateral air entry, no wheezing, no rales, no crackles  Cardiovascular: S1S2, RRR, no murmurs, no rubs  GI: abd- soft, nonT, nonD, BS present  Skin/Integumen: no rashes, no cyanosis, status post right hip surgery  Neuro- awake, alert, demented      Medications     sodium chloride 100 mL/hr at 09/03/19 2232       acetaminophen  975 mg Oral Q8H     citalopram  10 mg Oral Daily     enoxaparin  40 mg Subcutaneous Q24H     fluticasone  1 spray Both Nostrils Daily     latanoprost  1 drop Both Eyes At Bedtime     levothyroxine  100 mcg Oral QAM AC     mirtazapine  15 mg Oral At Bedtime     multivitamin w/minerals  1 tablet Oral At Bedtime     senna-docusate  1 tablet Oral BID    Or     senna-docusate  2 tablet Oral BID     sodium chloride (PF)  3 mL Intracatheter Q8H     sodium chloride (PF)  3 mL Intracatheter Q8H     tamsulosin  0.4 mg Oral At Bedtime     vitamin D3  2,000 Units Oral Daily       Data   Recent Labs   Lab 09/06/19  0601 09/05/19  0556 09/04/19 1921 09/04/19  0555 09/03/19 1912   WBC  --   --   --   --  4.3    HGB 11.8* 11.8*  --   --  14.0   MCV  --   --   --   --  95   PLT  --  125* 138*  --  185   INR  --   --   --   --  0.95   NA  --  132*  --  131* 131*   POTASSIUM  --  4.1  --  4.3 3.3*   CHLORIDE  --  101  --  101 97   CO2  --  27  --  25 25   BUN  --  10  --  15 13   CR  --  0.90 0.71 0.89 0.84   ANIONGAP  --  4  --  5 9   RONALD  --  8.4*  --  8.4* 9.2   * 114*  --  132* 105*       No results found for this or any previous visit (from the past 24 hour(s)).

## 2019-09-07 NOTE — CONSULTS
Patient is an 78-year-old gentleman who underwent repair of a femoral neck fracture and has had postoperative urinary retention.  He apparently has Alzheimer's dementia but seems alert this morning and answers appropriately.  He has had difficulty urinating before but is never seen a urologist.  Other past medical history is significant for hypertension, thyroid disease, weight loss, appendectomy, non-smoker.  He is currently taking narcotics for pain which may have an effect on his bladder muscle.  His Carlos catheter is draining clear urine at this time  Laboratory studies: Normal creatinine, electrolytes, white count.  Hemoglobin 11.2, platelets 125,000  Assessment: Postoperative urinary retention-may in part be due to BPH, in part due to narcotics and recent anesthetic  Suggestions: Indwelling Carlos for now.  Once he is ambulatory he can have a trial of void.  With 780 cc residual the staff should wait at least a week for trial of void.  If he is being discharged home soon he can follow-up in my office in 10 to 14 days for catheter removal and trial of void there.

## 2019-09-07 NOTE — PLAN OF CARE
A&O to self. VSS. CMS intact. Neuros intact. Dressing dried drainage. Scheduled tylenol for pain. Regular diet. Assist 2. Repo q2h. Incontinent to stool. Carlos.

## 2019-09-07 NOTE — PROGRESS NOTES
[facility]    Hospitalist Progress Note      Assessment & Plan   78-year-old male with history of Alzheimer's dementia, resident of a memory care unit, history of hypertension, hyperlipidemia, BPH, hypothyroidism who was sent to the ER because of unwitnessed fall and right hip pain.     1.  Right femoral neck fracture s/p fall  - Xray pelvis/right hip- Right femoral neck fracture with mild impaction and slight displacement.  - Status post surgery on 9/4  - Advance diet as tolerated  - pain management   - DVT/px as per Ortho, PCD for now  - As per Ortho WB for transfers only, otherwise non-WB right LE with walker.   - minimize narcotics, sedative given his baseline dementia  - UA is abnormal but urine culture is negative.        2.  Unwitnessed fall:    - Reason for the fall is not clear, apparently, no LOC  - CT head is negative for any acute abnormality;  CT C spine negative for any acute fracture; CXR negative for any pneumothorax or rib fracture.    - fall precautions    3. Hypokalemia  -Replace as per protocol    4. Hyponatremia  -Mild hyponatremia noted, possibly hypovolemic, continue IV hydration.    5. H/o HTN  - not on meds at home  - here BP intermittently elevated   - monitor BP  - Hydralazine iv prn    6.  Alzheimer's dementia:    - continue PTA Remeron at bedtime  - PTA also on Seroquel 25 mg po daily prn for agitation; will change Seroquel to 25 mg po BID prn  - high risk for delirium/encephalopathy  - minimize narcotics, sedatives      7. Hypothyroidism  - continue PTA levothyroxine    8.  BPH  - continue PTA Flomax  9.Acute blood loss anemia  -Hemoglobin dropped from 14-11.8, but stable.  DVT Prophylaxis: Pneumatic Compression Devices  Code Status: DNR/DNI  Expected discharge: 1 to 2 days to TCU as per Ortho recommendations    Cirilo Loving MD    Interval History   No new issues noted. Seen by urology for urine retention. Continue reddy. No pain complaints.     -Data reviewed today: I  reviewed all new labs and imaging results over the last 24 hours. I personally reviewed the head CT image(s) showing no acute pathology and the Xray pelvis/right hip image(s) showing right femoral neck fracture.    Physical Exam   Temp: 97.1  F (36.2  C) Temp src: Oral BP: (!) 150/76 Pulse: 68 Heart Rate: 55 Resp: 16 SpO2: 97 % O2 Device: None (Room air)    Vitals:    09/03/19 2205 09/06/19 0727 09/07/19 0547   Weight: 61.4 kg (135 lb 6.4 oz) 61.1 kg (134 lb 11.2 oz) 58.1 kg (128 lb)     Vital Signs with Ranges  Temp:  [97.1  F (36.2  C)-99  F (37.2  C)] 97.1  F (36.2  C)  Pulse:  [63-68] 68  Heart Rate:  [55-68] 55  Resp:  [14-16] 16  BP: (115-150)/(62-76) 150/76  SpO2:  [96 %-98 %] 97 %  I/O last 3 completed shifts:  In: 940 [P.O.:940]  Out: 2600 [Urine:2600]    Constitutional: NAD  Respiratory: Bilateral air entry, no wheezing, no rales, no crackles  Cardiovascular: S1S2, RRR, no murmurs, no rubs  GI: abd- soft, nonT, nonD, BS present  Skin/Integumen: no rashes, no cyanosis, status post right hip surgery  Neuro- awake, alert, demented      Medications     sodium chloride 100 mL/hr at 09/03/19 2232       acetaminophen  975 mg Oral Q8H     citalopram  10 mg Oral Daily     enoxaparin  40 mg Subcutaneous Q24H     fluticasone  1 spray Both Nostrils Daily     latanoprost  1 drop Both Eyes At Bedtime     levothyroxine  100 mcg Oral QAM AC     lidocaine  10 mL Urethral Once     mirtazapine  15 mg Oral At Bedtime     multivitamin w/minerals  1 tablet Oral At Bedtime     senna-docusate  1 tablet Oral BID    Or     senna-docusate  2 tablet Oral BID     sodium chloride (PF)  3 mL Intracatheter Q8H     tamsulosin  0.4 mg Oral At Bedtime     vitamin D3  2,000 Units Oral Daily       Data   Recent Labs   Lab 09/06/19  0601 09/05/19  0556 09/04/19 1921 09/04/19  0555 09/03/19 1912   WBC  --   --   --   --  4.3   HGB 11.8* 11.8*  --   --  14.0   MCV  --   --   --   --  95   PLT  --  125* 138*  --  185   INR  --   --   --   --   0.95   NA  --  132*  --  131* 131*   POTASSIUM  --  4.1  --  4.3 3.3*   CHLORIDE  --  101  --  101 97   CO2  --  27  --  25 25   BUN  --  10  --  15 13   CR  --  0.90 0.71 0.89 0.84   ANIONGAP  --  4  --  5 9   RONALD  --  8.4*  --  8.4* 9.2   * 114*  --  132* 105*       No results found for this or any previous visit (from the past 24 hour(s)).

## 2019-09-07 NOTE — PLAN OF CARE
Discharge Planner PT   Patient plan for discharge: None stated  Current status: Pt sitting in chair and agreeable to participate in PT session. Completed two chair<>bed transfers with MinAx2 and FWW. Pt is WBAT for transfers only. Pt participated seated marching prior to movement. Pt requires repeated cues due to Alzheimer's.  Barriers to return to prior living situation: WB restrictions for mobility, decreased strength, impaired balance, level of assist, decreased functional mobility.  Recommendations for discharge: TCU  Rationale for recommendations: Pt would benefit from TCU skilled therapy to address above limitations and improve functional mobility.       Entered by: Price Burgos 09/07/2019 2:03 PM

## 2019-09-07 NOTE — CONSULTS
Care Transition Initial Assessment - KENNETH     Met with: Family    Active Problems:    Hip fracture, right, closed, initial encounter (H)    Closed right hip fracture (H)    Hip fracture requiring operative repair (H)       DATA  Lives With: facility resident   Living Arrangements: assisted living(memory care)  Quality of Family Relationships: involved, helpful  Quality of Family Relationships: involved, helpful  Transportation Anticipated: family or friend will provide    ASSESSMENT  Cognitive Status:  Alert and Oriented to self. Stephanie Alzheimers, lives in memory care [facility].    KENNETH has reviewed pt records. Pt is Betito, an 78 gentleman who was admitted after a fall, underwent hip fracture repair. KENNETH spoke with pt healthcare agent, Shayla ([phone]) this morning to introduce self/role, perform assessment, and discuss discharge planning. Shayla shared that pt resides at [facility], though she is in the process and planning a move into [facility] in [place] ([facility]). Currently, there is a two week waitlist for the facility. Shayla is aware that there may need to be a transition back to [facility] prior to a move into [facility] and she plans to meet with [facility] tomorrow, 9/8/19 to discuss plans. KENNETH informed Shayla of the current recommendation for TCU and discussed the facilities that have a memory [facility] TCU. Shayla agreeable to sending referrals to all, therefore KENNETH fax referral information to: [facility], [facility], [facility], and [facility]. KNENETH also sent referral to [facility] in case their had been discussions between [facility] and TCU in regarding to attempting to have pt return to the campus. Shayla does not mind location and willing to visit anywhere, just wants to ensure most appropriate placement. Referral process, insurance coverage, average length of stay, and general expectations for TCU all discussed. Pt did require a  however this was discontinued at 2300 on 9/6/19. KENNETH  to keep Shayla updated regarding discharge plan.   PLAN  Financial costs for the patient includes None known at this time.  Patient given options and choices for discharge Yes, spoke with healthcare agent, Shayla.  Patient/family is agreeable to the plan?  Yes  Transportation/person available to transport on day of discharge is: Unknown at this time, likely needs wc transport.  Patient Goals and Preferences: Memory care TCU.  Patient anticipates discharging to: Memory care TCU.    ANGEL Fernando, [facility]  Daytime (8:00am-4:30pm): [phone]  After-Hours  Pager (4:30pm-11:30pm): [phone]

## 2019-09-07 NOTE — PLAN OF CARE
Pt Alert to self only, VSS on RA , IVSL , up with 2 or lift, regular diet, reddy in place due to retention - see MD order, CMS intact, new dressing placed today - patient removed original dressing, pain controlled with scheduled tylenol. Continue to monitor.

## 2019-09-07 NOTE — PLAN OF CARE
PT: Attempted to see pt this AM, upon arrival RN had just helped him to bed as pt was asleep sitting in chair. Requested to let pt sleep and to try again this afternoon.

## 2019-09-07 NOTE — PROGRESS NOTES
Ortho:  Patient seen. Slightly confused. Removed gaurav himself.    Wound benign  Distal CMSI  Follows commands    S/p Right hip ORIF     discharge to TCU. Replace Gaurav.    Lidia Mo PA-C  [phone]

## 2019-09-07 NOTE — PROVIDER NOTIFICATION
Patient BP 80/41 while sitting on the chair.  Denied lightheadedness /dizziness.  Improved to 100/52 on [place] position.   113/56 flat on the bed.  Patient was not on IVFs. Loss IV access.    Dr Loving called. Since BP improved and patient was asymptomatic, no new orders regarding BP. IVFs discontinue  No new IV access at this moment. Dr is aware patient has reddy catheter for retention. Out put last shift 800 ml. Sodium 135 this morning.   Neuro checks order modified to twice daily.

## 2019-09-07 NOTE — PROGRESS NOTES
SW:    I: KENNETH following for discharge planning. KENNETH spoke with Shayla [phone] and provided update. [facility] does not have bed availability in their memory care TCU for a male pt at this time. KENNETH has not yet heard back from [facility], [facility], and [facility]. [facility] contacted KENNETH and they are attempting to accommodate pt, they need to figure out a male bed. Shayla confirmed that she prefers memory care TCU however if this is not available she is open to discussing [facility] for TCU. KENNETH to follow up with facilities in AM.    P: SW to follow.    ANGEL Fernando, [facility]  Daytime (8:00am-4:30pm): [phone]  After-Hours KENNETH Pager (4:30pm-11:30pm): [phone]

## 2019-09-08 NOTE — PROGRESS NOTES
[facility]    Hospitalist Progress Note      Assessment & Plan   78-year-old male with history of Alzheimer's dementia, resident of a memory care unit, history of hypertension, hyperlipidemia, BPH, hypothyroidism who was sent to the ER because of unwitnessed fall and right hip pain.     1.  Right femoral neck fracture s/p fall  - Xray pelvis/right hip- Right femoral neck fracture with mild impaction and slight displacement.  - Status post surgery on 9/4  - Advance diet as tolerated  - pain management   - DVT/px as per Ortho, PCD for now  - As per Ortho WB for transfers only, otherwise non-WB right LE with walker.   - minimize narcotics, sedative given his baseline dementia  - UA is abnormal but urine culture is negative.        2.  Unwitnessed fall:    - Reason for the fall is not clear, apparently, no LOC  - CT head is negative for any acute abnormality;  CT C spine negative for any acute fracture; CXR negative for any pneumothorax or rib fracture.    - fall precautions    3. Hypokalemia  - Replace as per protocol    4. Hyponatremia  -Mild hyponatremia noted, possibly hypovolemic, continue IV hydration.    5. H/o HTN  - not on meds at home  - here BP intermittently elevated   - monitor BP  - Hydralazine iv prn    6.  Alzheimer's dementia:    - continue PTA Remeron at bedtime  - PTA also on Seroquel 25 mg po BID prn  - high risk for delirium/encephalopathy  - minimize narcotics, sedatives      7. Hypothyroidism  - continue PTA levothyroxine    8.  BPH  - continue PTA Flomax. Followed by urology. Postoperative urinary retention - may in part be due to BPH, in part due to narcotics and recent anesthetic. Recommended indwelling Carlos for now.  Once he is ambulatory he can have a trial of void.      9.Acute blood loss anemia  -Hemoglobin dropped from 14-11.2, but stable.    DVT Prophylaxis: Pneumatic Compression Devices  Code Status: DNR/DNI  Expected discharge: possibly tomm to TCU as per Ortho  recommendations    Cirilo Loving MD    Interval History   No new issues noted. SW working on finding him a TCU, possible discharge tomorrow.     -Data reviewed today: I reviewed all new labs and imaging results over the last 24 hours. I personally reviewed the head CT image(s) showing no acute pathology and the Xray pelvis/right hip image(s) showing right femoral neck fracture.    Physical Exam   Temp: 97.9  F (36.6  C) Temp src: Oral BP: 120/55 Pulse: 70 Heart Rate: 53 Resp: 16 SpO2: 94 % O2 Device: None (Room air)    Vitals:    09/06/19 0727 09/07/19 0547 09/08/19 0322   Weight: 61.1 kg (134 lb 11.2 oz) 58.1 kg (128 lb) 58 kg (127 lb 13.9 oz)     Vital Signs with Ranges  Temp:  [96.5  F (35.8  C)-98.5  F (36.9  C)] 97.9  F (36.6  C)  Pulse:  [70] 70  Heart Rate:  [53-64] 53  Resp:  [16] 16  BP: ()/(41-63) 120/55  SpO2:  [93 %-99 %] 94 %  I/O last 3 completed shifts:  In: 830 [P.O.:830]  Out: 1000 [Urine:1000]    Constitutional: NAD  Respiratory: Bilateral air entry, no wheezing, no rales, no crackles  Cardiovascular: S1S2, RRR, no murmurs, no rubs  GI: abd- soft, nonT, nonD, BS present  Skin/Integumen: no rashes, no cyanosis, status post right hip surgery  Neuro- awake, alert, demented      Medications       citalopram  10 mg Oral Daily     enoxaparin  40 mg Subcutaneous Q24H     fluticasone  1 spray Both Nostrils Daily     latanoprost  1 drop Both Eyes At Bedtime     levothyroxine  100 mcg Oral QAM AC     lidocaine  10 mL Urethral Once     mirtazapine  15 mg Oral At Bedtime     multivitamin w/minerals  1 tablet Oral At Bedtime     senna-docusate  1 tablet Oral BID    Or     senna-docusate  2 tablet Oral BID     tamsulosin  0.4 mg Oral At Bedtime     vitamin D3  2,000 Units Oral Daily       Data   Recent Labs   Lab 09/08/19  0649 09/07/19  0658 09/06/19  0601 09/05/19  0556  09/03/19  1912   WBC 4.3 4.3  --   --   --  4.3   HGB 11.2* 11.2* 11.8* 11.8*  --  14.0   MCV 97 98  --   --   --  95    125*  --   125*   < > 185   INR  --   --   --   --   --  0.95   * 135  --  132*   < > 131*   POTASSIUM 3.8 3.9  --  4.1   < > 3.3*   CHLORIDE 100 100  --  101   < > 97   CO2 29 31  --  27   < > 25   BUN 17 13  --  10   < > 13   CR 0.69 0.73  --  0.90   < > 0.84   ANIONGAP 3 4  --  4   < > 9   RONALD 8.2* 8.9  --  8.4*   < > 9.2   GLC 99 95 113* 114*   < > 105*   ALBUMIN PENDING  --   --   --   --   --    PROTTOTAL PENDING  --   --   --   --   --    BILITOTAL PENDING  --   --   --   --   --    ALKPHOS PENDING  --   --   --   --   --    ALT PENDING  --   --   --   --   --    AST PENDING  --   --   --   --   --     < > = values in this interval not displayed.       No results found for this or any previous visit (from the past 24 hour(s)).

## 2019-09-08 NOTE — PLAN OF CARE
Pt alert to self- Alzheimer's, VSS on RA , regular diet, up with 2 - moving better than yesterday, dressing CDI, CMS intact, reddy in place due to retention - see MD order, continue to monitor.

## 2019-09-08 NOTE — PLAN OF CARE
Alert and oriented x 1. Neuro checks WNL. Dressing cdi, cms intact. Carlos. Loss IV access. Tylenol for pain.  Up with assist of 2 and walker. Tolerated regular diet. LS clear, diminished. On room air. Soft BPs, asymptomatic. Discharge TBD

## 2019-09-08 NOTE — PLAN OF CARE
Discharge Planner PT   Patient plan for discharge: None stated.  Current status: Pt supine in bed on arrival and agreed to PT session. Pt participated in supine LE strengthening exercises with Min Ax1 for initiating movement. Pt requires frequent cues throughout session. Bed>chair transfer with MinAx2 and FWW. Pt performed sit<>stand x3 with FWW and Min Ax2. Pt is pleasantly confused throughout treatment.  Barriers to return to prior living situation: WB restrictions for mobility, decreased strength, impaired balance, level of assist, decreased functional mobility.  Recommendations for discharge: TCU  Rationale for recommendations: Pt would benefit from TCU skilled therapy to address above limitations and improve functional mobility.       Entered by: Price Burgos 09/08/2019 4:44 PM

## 2019-09-09 NOTE — PLAN OF CARE
Pt is alert to self, pleasantly confused. Up in chair for meals. Denies pain, decline pain meds. Carlos patent w/ adequate output, replaced into leg bag. No IV access. Dressing aquacel, CDI. Denies chest pain or SOB. Discharging to walker [facility] @ 1700 via [facility] transportation w/ belonging. Shayla, The healthcare agent is aware of discharge/transportation per .

## 2019-09-09 NOTE — DISCHARGE SUMMARY
"[facility]    Discharge Summary  Hospitalist    Date of Admission:  9/3/2019  Date of Discharge:  9/9/2019  Discharging Provider: Teetee Reyes MD    Discharge Diagnoses   Active Problems:    Hip fracture, right, closed, initial encounter (H)    Closed right hip fracture (H)    Hip fracture requiring operative repair (H)    History of Present Illness   This is an 78-year-old male with history of Alzheimer's dementia, resident of a memory care unit, history of hypertension, hyperlipidemia, BPH, hypothyroidism who was sent to the ER because of unwitnessed fall and right hip pain.      The patient is a very poor historian, has baseline dementia, unable to provide any history at this time.  Most of the history is obtained from the medical records, talking to the ER physician.  The patient had an unwitnessed fall in the hallway, not sure that he was using his walker or not, found on the floor in the hallway.  He is able to sit by himself but unable to stand up.  When they tried to stand him up, he had pain in the right hip.  Subsequently, he was sent to the ER.      There is no obvious history of any vomiting or passing out.  The patient was not found unconscious.  No history of fever, chills, headache, dizziness at this time.  On review of systems, the patient is unable to give much history.  He said no to most of the questions and said \"I'm doing fine\" and denies even having any pain at this time.      In the ER, he had an extensive workup done for the fall including CT scan of the head, CT of the cervical spine, an x-ray of pelvis and chest x-ray.  CT pelvis does show impacted fracture of the right hip, so the Hospitalist is consulted to admit the patient.     Hospital Course   Elie Marcano was admitted on 9/3/2019.  The following problems were addressed during his hospitalization:    Active Problems:    Hip fracture, right, closed, initial encounter (H)    Closed right hip fracture (H)    Hip " fracture requiring operative repair (H)    1.  Right femoral neck fracture s/p fall  Xray pelvis/right hip- Right femoral neck fracture with mild impaction and slight displacement.  He underwent surgery on 9/4, his diet was slowly advanced, he has good pain control now, he had and he is DVT prophylaxis as per orthopedic input, the suggested weightbearing only for transfers, otherwise nonweightbearing for left lower extremity with walker.  Plan is to minimize narcotics, he had an abnormal UA but his urine culture is negative.  His fall was secondary to unwitnessed fall, there was no loss of consciousness, CT head is negative for acute abnormality and the CT C-spine is negative for fracture, chest x-ray is negative for any pneumothorax or rib fracture, she he was seen by PT and OT and the plan is to obtain the TCU discharge.  Lites were managed and monitored and managed while inpatient.  Had mildly elevated blood pressures, possibly secondary to anxiety and pain, no new blood pressure medications were started.             Alzheimer's dementia: While inpatient is PTA Remeron was continued, he was on Seroquel 25 mg p.o. twice daily PRN PTA that was also continued, currently he does not have any added delirium or encephalopathy.          7. Hypothyroidism  - continue PTA levothyroxine     8.  BPH  - continue PTA Flomax. Followed by urology. Postoperative urinary retention - may in part be due to BPH, in part due to narcotics and recent anesthetic. Recommended indwelling Carlos for now.  Once he is ambulatory he can have a trial of void.       9.Acute blood loss anemia  -Hemoglobin dropped from 14-11.2, but stable.      Teetee Reyes MD    Significant Results and Procedures       Pending Results   These results will be followed up by nursing home  physician   Unresulted Labs Ordered in the Past 30 Days of this Admission     No orders found from 8/4/2019 to 9/4/2019.          Code Status   DNR / DNI       Primary Care  Physician   Debbie Plata    Physical Exam   Temp: 98.2  F (36.8  C) Temp src: Oral BP: 133/72   Heart Rate: 64 Resp: 16 SpO2: 99 % O2 Device: None (Room air)    Vitals:    09/07/19 0547 09/08/19 0322 09/09/19 0602   Weight: 58.1 kg (128 lb) 58 kg (127 lb 13.9 oz) 59.9 kg (132 lb)     Vital Signs with Ranges  Temp:  [97.8  F (36.6  C)-98.2  F (36.8  C)] 98.2  F (36.8  C)  Heart Rate:  [56-72] 64  Resp:  [16-18] 16  BP: (107-133)/(51-72) 133/72  SpO2:  [96 %-99 %] 99 %  I/O last 3 completed shifts:  In: 735 [P.O.:735]  Out: 1950 [Urine:1950]    The patient was examined on the day of discharge.    Discharge Disposition   Discharged to nursing home  Condition at discharge: Stable    Consultations This Hospital Stay   PHYSICAL THERAPY ADULT IP CONSULT  OCCUPATIONAL THERAPY ADULT IP CONSULT  ORTHOPEDIC SURGERY IP CONSULT  OCCUPATIONAL THERAPY ADULT IP CONSULT  PHYSICAL THERAPY ADULT IP CONSULT  PHYSICAL THERAPY ADULT IP CONSULT  OCCUPATIONAL THERAPY ADULT IP CONSULT  CARE TRANSITION RN/SW IP CONSULT  UROLOGY IP CONSULT  SOCIAL WORK IP CONSULT    Time Spent on this Encounter   ITeetee MD, personally saw the patient today and spent greater than 30 minutes discharging this patient.    Discharge Orders      General info for SNF    Length of Stay Estimate: Short Term Care: Estimated # of Days <30  Condition at Discharge: Improving  Level of care:skilled   Rehabilitation Potential: Good  Admission H&P remains valid and up-to-date: Yes  Recent Chemotherapy: N/A  Use Nursing Home Standing Orders: Yes     Mantoux instructions    Give two-step Mantoux (PPD) Per Facility Policy Yes     Reason for your hospital stay    Right femoral neck fracture closed reduction and percutaneous pinning     Wound care    Site:   Right hip  Instructions:  Leave dressing intact, reinforce if needed     Activity - Up with nursing assistance     Weight bearing status    Non-WB right LE, bed to chair transfers     Follow Up and recommended  labs and tests    Follow up with Dr. Frank/Bo JOYNER in 2 weeks for bandage removal, suture removal and follow-up x-rays.  No follow up labs or test are needed prior to visit.  Call [facility] for appointment or questions [phone]  After hours/main number [phone]  Basic metabolic panel and cbc  in 4-5 days     Physical Therapy Adult Consult    Evaluate and treat as clinically indicated.    Reason:  Right femoral neck fracture closed reduction and percutaneous pinning     Occupational Therapy Adult Consult    Evaluate and treat as clinically indicated.    Reason:  Right femoral neck fracture closed reduction and percutaneous pinning     Fall precautions     Advance Diet as Tolerated    Follow this diet upon discharge: Orders Placed This Encounter      Advance Diet as Tolerated: Regular Diet Adult     Discharge Medications   Current Discharge Medication List      START taking these medications    Details   polyethylene glycol (MIRALAX/GLYCOLAX) packet Take 17 g by mouth daily as needed for constipation    Associated Diagnoses: Hip fracture, right, closed, initial encounter (H)      senna-docusate (SENOKOT-S/PERICOLACE) 8.6-50 MG tablet Take 2 tablets by mouth 2 times daily as needed for constipation    Associated Diagnoses: Hip fracture, right, closed, initial encounter (H)         CONTINUE these medications which have CHANGED    Details   QUEtiapine (SEROQUEL) 25 MG tablet Take 1 tablet (25 mg) by mouth 2 times daily as needed (agitation, restlessness)    Associated Diagnoses: Early onset Alzheimer's disease with behavioral disturbance         CONTINUE these medications which have NOT CHANGED    Details   camphor-menthol (DERMASARRA) 0.5-0.5 % external lotion Apply topically 2 times daily as needed for skin care Apply a thin layer to rash on chest      citalopram (CELEXA) 10 MG tablet TAKE 1 TABLET BY MOUTH ONCE DAILY  Qty: 28 tablet, Refills: 98    Associated Diagnoses: Other depression      fluticasone  (FLONASE) 50 MCG/ACT nasal spray INHALE 1 SPRAY IN EACH NOSTRIL DAILY  Qty: 16 g, Refills: 98    Associated Diagnoses: Chronic rhinitis      latanoprost (XALATAN) 0.005 % ophthalmic solution INSTILL ONE DROP IN EACH EYE EVERY NIGHT AT BEDTIME  Qty: 2.5 mL, Refills: 97    Associated Diagnoses: Glaucoma suspect, bilateral      levothyroxine (SYNTHROID/LEVOTHROID) 100 MCG tablet TAKE 1 TABLET BY MOUTH ONCE DAILY  Qty: 31 tablet, Refills: 98    Associated Diagnoses: Other specified hypothyroidism      MAPAP 325 MG tablet TAKE TWO TABLETS (650MG) BY MOUTH FOUR TIMES A DAY AS NEEDED FOR PAIN  Qty: 180 tablet, Refills: 98    Associated Diagnoses: Osteoarthritis of shoulder, unspecified laterality, unspecified osteoarthritis type      mirtazapine (REMERON) 15 MG tablet Take 15 mg by mouth At Bedtime      multivitamin w/minerals (THERA-VIT-M) tablet Take 1 tablet by mouth At Bedtime      tamsulosin (FLOMAX) 0.4 MG capsule TAKE 1 CAPSULE BY MOUTH ONCE DAILY  Qty: 31 capsule, Refills: 98    Associated Diagnoses: Benign prostatic hyperplasia, unspecified whether lower urinary tract symptoms present      VITAMIN D3 1000 units tablet TAKE TWO TABLETS (2000 UNITS) BY MOUTH ONCE DAILY  Qty: 62 tablet, Refills: 98    Associated Diagnoses: Deficiency of nutrient elements           Allergies   No Known Allergies  Data   Most Recent 3 CBC's:  Recent Labs   Lab Test 09/09/19  0557 09/08/19  0649 09/07/19  0658   WBC 4.8 4.3 4.3   HGB 10.9* 11.2* 11.2*   MCV 97 97 98    150 125*      Most Recent 3 BMP's:  Recent Labs   Lab Test 09/09/19  0557 09/08/19  0649 09/07/19  0658   * 132* 135   POTASSIUM 4.0 3.8 3.9   CHLORIDE 98 100 100   CO2 30 29 31   BUN 19 17 13   CR 0.77 0.69 0.73   ANIONGAP 3 3 4   RONALD 8.3* 8.2* 8.9   * 99 95     Most Recent 2 LFT's:  Recent Labs   Lab Test 09/09/19  0557 09/08/19  0649   AST 29 20   ALT 27 16   ALKPHOS 57 51   BILITOTAL 0.5 0.5     Most Recent INR's and Anticoagulation Dosing  History:  Anticoagulation Dose History     Recent Dosing and Labs Latest Ref Rng & Units 9/3/2019    INR 0.86 - 1.14 0.95        Most Recent 3 Troponin's:  Recent Labs   Lab Test 06/11/18  1138 06/06/18  1110   TROPI <0.015 <0.015     Most Recent Cholesterol Panel:  Recent Labs   Lab Test 02/04/16   CHOL 201      HDL 69   TRIG 91     Most Recent 6 Bacteria Isolates From Any Culture (See EPIC Reports for Culture Details):  Recent Labs   Lab Test 09/05/19  0530   CULT No growth     Most Recent TSH, T4 and A1c Labs:  Recent Labs   Lab Test 01/10/19 11/28/18   TSH 1.00* 0.47   A1C  --  5.8*     Results for orders placed or performed during the hospital encounter of 09/03/19   XR Pelvis w Hip Right 1 View    Narrative    PELVIS AND HIP RIGHT ONE VIEW   9/3/2019 7:45 PM     HISTORY:  Pain    FINDINGS: Degenerative change of the lower lumbar spine.      Impression    IMPRESSION: Right femoral neck fracture with mild impaction and slight  displacement. Small comminuted fragment laterally.    JULIUS WINTER MD   CT Head w/o Contrast    Narrative    CT OF THE HEAD WITHOUT CONTRAST 9/3/2019 7:56 PM     COMPARISON: None    HISTORY: Altered level of consciousness (LOC), unexplained.    TECHNIQUE: 5 mm thick axial CT images of the head were acquired  without IV contrast material.    FINDINGS: There is moderate diffuse cerebral volume loss. There are  subtle patchy areas of decreased density in the cerebral white matter  bilaterally that are consistent with sequela of chronic small vessel  ischemic disease.    The ventricles and basal cisterns are within normal limits in  configuration given the degree of cerebral volume loss. There is no  midline shift. There are no extra-axial fluid collections.    No intracranial hemorrhage, mass or recent infarct.    The visualized paranasal sinuses are well-aerated. There is no  mastoiditis. There are no fractures of the visualized bones.      Impression    IMPRESSION: Diffuse cerebral  volume loss and cerebral white matter  changes consistent with chronic small vessel ischemic disease. No  evidence for acute intracranial pathology.      Radiation dose for this scan was reduced using automated exposure  control, adjustment of the mA and/or kV according to patient size, or  iterative reconstruction technique    ABIEL SKELTON MD   XR Cervical Spine 2/3 Views    Narrative    CERVICAL SPINE TWO TO THREE VIEWS 9/3/2019 7:49 PM     COMPARISON: None    HISTORY: Fall/dementia      Impression    IMPRESSION: There is left convex curvature of the cervical spine  centered at the C5 level. Alignment of the cervical vertebrae is  otherwise normal. Vertebral body heights of the cervical spine appear  normal. There is no definite evidence for fracture of the cervical  spine, although evaluation is limited by left convex curvature. There  is severe degenerative endplate spurring and severe facet arthropathy  throughout the cervical spine. There is no prevertebral soft tissue  swelling.    ABIEL SKELTON MD   XR Chest 1 View    Narrative    CHEST ONE VIEW   9/3/2019 7:48 PM     HISTORY: Preoperative    COMPARISON: 6/6/2018.      Impression    IMPRESSION: No acute cardiopulmonary disease.    ONELIA GIORDANO MD   CT Cervical Spine w/o Contrast    Narrative    CT OF THE CERVICAL SPINE WITHOUT CONTRAST   9/3/2019 7:56 PM     COMPARISON: Cervical spine x-ray series same day.    HISTORY: Cervical spine trauma, high clinical risk (NEXUS/CCR)     TECHNIQUE: Axial images of the cervical spine were acquired without  intravenous contrast. Multiplanar reformations were created.        Impression    IMPRESSION: Subtle left convex curvature of the cervical spine again  noted. Alignment of the cervical vertebrae is otherwise normal.  Vertebral body heights of the cervical spine are normal.  Craniocervical alignment is normal. There is no evidence for fracture  of the cervical spine. Partial assimilation of C1 to the occiput  is  noted. There is degenerative endplate spurring and degenerative facet  arthropathy throughout cervical spine. There is no degenerative spinal  canal narrowing of the cervical spine. There is no prevertebral soft  tissue swelling.      Radiation dose for this scan was reduced using automated exposure  control, adjustment of the mA and/or kV according to patient size, or  iterative reconstruction technique    ABIEL SKELTON MD   XR Surgery WHITNEY L/T 5 Min Fluoro w Stills    Narrative    SURGERY C-ARM FLUOROSCOPY LESS THAN FIVE MINUTES WITH STILLS  9/4/2019  1:45 PM     HISTORY: ORIF right hip.    COMPARISON: None.      Impression    IMPRESSION:  Three intraoperative radiographs submitted for review  with intertrochanteric screws through the proximal femur. Basicervical  fracture through the proximal femur is partially visible, though  incomplete given these are intraoperative radiographs. Total  fluoroscopic time 0.8 minutes.    MARZENA DUARTE MD

## 2019-09-09 NOTE — PLAN OF CARE
Discharge Planner PT   Patient plan for discharge: None stated.  Current status: Pt in supine upon arrival of therapist, pt agreeable to session. Pt performed supine-sit with Lin[place], pt required frequent cues for sequencing. Sit <> stand with FWW and modA of 2 d/t posterior lean initially upon standing. Pt transferred to bedside chair with FWW and Linwood of 2 for safety, pt continues to require cues for safe technique as pt attempted to sit prior to reaching chair.   Barriers to return to prior living situation: Level of assist, WB restriction, Fall risk, Impaired safety awareness  Recommendations for discharge: TCU  Rationale for recommendations: Pt is below baseline in regards to functional mobility and will benefit from continued skilled PT intervention in order to progress independence and safety with mobility.        Entered by: Shelbie Alfaro 09/09/2019 9:22 AM     Physical Therapy Discharge Summary    Reason for therapy discharge:    Discharged to transitional care facility.    Progress towards therapy goal(s). See goals on Care Plan in [facility] electronic health record for goal details.  Goals not met.  Barriers to achieving goals:   discharge from facility.    Therapy recommendation(s):    Continued therapy is recommended.  Rationale/Recommendations:  To progress independence and safety with functional mobility.

## 2019-09-09 NOTE — DISCHARGE INSTRUCTIONS
Per Urology: Indwelling Carlos for now. Once he is ambulatory he can have a trial of void.  With 780 cc residual the staff should wait at least a week for trial of void.  If he is being discharged home soon he can follow-up in my office in 10 to 14 days for catheter removal and trial of void there.    Phone # [phone]

## 2019-09-09 NOTE — PLAN OF CARE
Alert to self. VSS. CMS intact. Neuro's intact. Denies pain. Dressing CDI. Carlos. Regular diet.

## 2019-09-09 NOTE — PLAN OF CARE
Alert and oriented x 1. Dressing cdi, cms intact. Neuros WNL. Up with assist of 2 and walker. Tylenol q4h PRN for pain

## 2019-09-09 NOTE — PROGRESS NOTES
KENNETH  D: Received call from [facility]-they can accept pt for admission but would like additional therapy notes. KENNETH faxed over requested therapy notes. KENNETH will wait to hear back from [facility] to confirm they can accept pt for admission.   P: Will continue to follow for discharge planning    Macy San MSW, [facility]     ADDENDUM @8760: [facility] called-they can accept pt for admission. Met with pt and friend, Shayla to discuss which facility would be first choice. Shayla would prefer [facility]. Called and updated [facility] and Bog[facility] on decision. Paged MD for orders.     PAS-RR    D: Per DHS regulation, KENNETH completed and submitted PAS-RR to [place] [employer] Direct Connect via the Senior LinkAge Line.  PAS-RR confirmation # is : [id number]    P: Further questions may be directed to [facility] LinkAge Line at #[phone], option #4 for PAS-RR staff.    ADDENDUM @0721: Faxed orders to [facility]. Called and set-up transport via  stretcher at 1700. Called and left VM for Shayla with discharge time along with contact information for [facility]. Called [facility] and updated on discharge time.

## 2019-09-12 PROBLEM — R65.21 SEPTIC SHOCK (H): Status: ACTIVE | Noted: 2019-01-01

## 2019-09-12 PROBLEM — A41.9 SEPTIC SHOCK (H): Status: ACTIVE | Noted: 2019-01-01

## 2019-09-12 NOTE — ED NOTES
"[facility]  ED Nurse Handoff Report    ED Chief complaint: Hypotension    ED Diagnosis:   Final diagnoses:   Sepsis, due to unspecified organism (H)   Urinary tract infection associated with indwelling urethral catheter, initial encounter (H)     Code Status: DNR / DNI    Allergies: No Known Allergies    Activity level - Baseline/Home:  Stand with Assist  Activity Level - Current:   Total Care, pt not out of bed in ED.     Patient's Preferred language: English   Needed?: No    Isolation: No  Infection: Not Applicable  C-Diff (Clostridium Difficile) diagnosis  Bariatric?: No    Vital Signs:   Vitals:    09/12/19 1430 09/12/19 1500 09/12/19 1515 09/12/19 1530   BP: 103/52 (!) 84/63 93/58 (!) 80/56   Pulse: 87 80 82 80   Resp: 22 17 23 16   Temp:       TempSrc:       SpO2:  99% 94%        Cardiac Rhythm: SR    Pain level: 0-10 Pain Scale: 0 , pt refuses pain, pt is confused so unsure of accuracy.     Is this patient confused?: Yes, hx of dementia  Does this patient have a guardian?  No         If yes, is there guardianship documents in the Epic \"Code/ACP\" activity?  N/A         Guardian Notified?  N/A  Liberal - Suicide Severity Rating Scale Completed?  No, secondary to hx of dementia, ЕКАТЕРИНА  If yes, what color did the patient score?  N/A    Patient Report: Initial Complaint: hypotension  Focused Assessment: pt comes to ED via EMS from nursing home residence. Today staff was concerned due to patient pulling out her reddy catheter, having reports of a low grade fever, and being hypotensive. Staff at residences home express concern to EMS regarding UTI. Pt is at nursing home for recovery of recent hip fracture. Pt received one dose of Seroquel and oxycodone either from either staff at residence or EMS. Pt denies N/V or any current pain. Upon MD assessment pain repeatedly yelled for help. Pt has baseline dementia and will occassionally still yell for help, but does not attempt to get out of his " bed.   Tests Performed:   Results for orders placed or performed during the hospital encounter of 09/12/19   XR Chest 2 Views    Narrative    CHEST TWO VIEWS 9/12/2019 2:55 PM     HISTORY:  Fever.    COMPARISON: 9/3/2019    FINDINGS: The lungs are hyperexpanded but clear. No pneumothorax or  pleural effusion. Heart size similar to prior.      Impression    IMPRESSION: No radiographic evidence of acute chest abnormality.     ANISHA YEBOAH MD   CBC with platelets differential   Result Value Ref Range    WBC 4.6 4.0 - 11.0 10e9/L    RBC Count 3.07 (L) 4.4 - 5.9 10e12/L    Hemoglobin 10.1 (L) 13.3 - 17.7 g/dL    Hematocrit 29.8 (L) 40.0 - 53.0 %    MCV 97 78 - 100 fl    MCH 32.9 26.5 - 33.0 pg    MCHC 33.9 31.5 - 36.5 g/dL    RDW 14.3 10.0 - 15.0 %    Platelet Count 133 (L) 150 - 450 10e9/L    Diff Method Automated Method     % Neutrophils 93.4 %    % Lymphocytes 4.2 %    % Monocytes 1.5 %    % Eosinophils 0.0 %    % Basophils 0.0 %    % Immature Granulocytes 0.9 %    Nucleated RBCs 0 0 /100    Absolute Neutrophil 4.3 1.6 - 8.3 10e9/L    Absolute Lymphocytes 0.2 (L) 0.8 - 5.3 10e9/L    Absolute Monocytes 0.1 0.0 - 1.3 10e9/L    Absolute Eosinophils 0.0 0.0 - 0.7 10e9/L    Absolute Basophils 0.0 0.0 - 0.2 10e9/L    Abs Immature Granulocytes 0.0 0 - 0.4 10e9/L    Absolute Nucleated RBC 0.0    Comprehensive metabolic panel   Result Value Ref Range    Sodium 134 133 - 144 mmol/L    Potassium 4.0 3.4 - 5.3 mmol/L    Chloride 103 94 - 109 mmol/L    Carbon Dioxide 23 20 - 32 mmol/L    Anion Gap 8 3 - 14 mmol/L    Glucose 102 (H) 70 - 99 mg/dL    Urea Nitrogen 34 (H) 7 - 30 mg/dL    Creatinine 1.09 0.66 - 1.25 mg/dL    GFR Estimate 63 >60 mL/min/[1.73_m2]    GFR Estimate If Black 73 >60 mL/min/[1.73_m2]    Calcium 8.2 (L) 8.5 - 10.1 mg/dL    Bilirubin Total 1.0 0.2 - 1.3 mg/dL    Albumin 2.2 (L) 3.4 - 5.0 g/dL    Protein Total 5.2 (L) 6.8 - 8.8 g/dL    Alkaline Phosphatase 183 (H) 40 - 150 U/L     (H) 0 - 70 U/L      (H) 0 - 45 U/L   UA with Microscopic reflex to Culture   Result Value Ref Range    Color Urine Light Brown     Appearance Urine Cloudy     Glucose Urine Negative NEG^Negative mg/dL    Bilirubin Urine Negative NEG^Negative    Ketones Urine Negative NEG^Negative mg/dL    Specific Gravity Urine 1.015 1.003 - 1.035    Blood Urine Large (A) NEG^Negative    pH Urine 8.5 (H) 5.0 - 7.0 pH    Protein Albumin Urine >600 (A) NEG^Negative mg/dL    Urobilinogen mg/dL Normal 0.0 - 2.0 mg/dL    Nitrite Urine Negative NEG^Negative    Leukocyte Esterase Urine Large (A) NEG^Negative    Source Catheterized Urine     WBC Urine >182 (H) 0 - 5 /HPF    RBC Urine >182 (H) 0 - 2 /HPF    Bacteria Urine Many (A) NEG^Negative /HPF    Mucous Urine Present (A) NEG^Negative /LPF   ISTAT gases lactate dale POCT   Result Value Ref Range    Ph Venous 7.40 7.32 - 7.43 pH    PCO2 Venous 40 40 - 50 mm Hg    PO2 Venous 28 25 - 47 mm Hg    Bicarbonate Venous 25 21 - 28 mmol/L    O2 Sat Venous 54 %    Lactic Acid 4.4 (HH) 0.7 - 2.1 mmol/L     Abnormal Results:     Labs Ordered and Resulted from Time of ED Arrival Up to the Time of Departure from the ED   CBC WITH PLATELETS DIFFERENTIAL - Abnormal; Notable for the following components:       Result Value    RBC Count 3.07 (*)     Hemoglobin 10.1 (*)     Hematocrit 29.8 (*)     Platelet Count 133 (*)     Absolute Lymphocytes 0.2 (*)     All other components within normal limits   COMPREHENSIVE METABOLIC PANEL - Abnormal; Notable for the following components:    Glucose 102 (*)     Urea Nitrogen 34 (*)     Calcium 8.2 (*)     Albumin 2.2 (*)     Protein Total 5.2 (*)     Alkaline Phosphatase 183 (*)      (*)      (*)     All other components within normal limits   ROUTINE UA WITH MICROSCOPIC REFLEX TO CULTURE - Abnormal; Notable for the following components:    Blood Urine Large (*)     pH Urine 8.5 (*)     Protein Albumin Urine >600 (*)     Leukocyte Esterase Urine Large (*)     WBC  Urine >182 (*)     RBC Urine >182 (*)     Bacteria Urine Many (*)     Mucous Urine Present (*)     All other components within normal limits   ISTAT  GASES LACTATE REECE POCT - Abnormal; Notable for the following components:    Lactic Acid 4.4 (*)     All other components within normal limits   LACTIC ACID WHOLE BLOOD   BLOOD CULTURE   BLOOD CULTURE   URINE CULTURE AEROBIC BACTERIAL   Treatments provided: IV fluids, anbx  Family Comments: NA  OBS brochure/video discussed/provided to patient/family: N/A              Name of person given brochure if not patient: na              Relationship to patient: NA    ED Medications:     Medications   0.9% sodium chloride BOLUS (0 mLs Intravenous Stopped 9/12/19 1617)     Followed by   sodium chloride 0.9% infusion (1,000 mLs Intravenous New Bag 9/12/19 1620)   acetaminophen (TYLENOL) tablet 650 mg (650 mg Oral Not Given 9/12/19 1528)   cefTRIAXone (ROCEPHIN) 1 g vial to attach to  mL bag for ADULTS or NS 50 mL bag for PEDS (0 g Intravenous Stopped 9/12/19 1607)   0.9% sodium chloride BOLUS (0 mLs Intravenous Stopped 9/12/19 1606)   acetaminophen (TYLENOL) Suppository 650 mg (650 mg Rectal Given 9/12/19 1607)       Drips infusing?:  Yes    For the majority of the shift this patient was Green.   Interventions performed were NA.    Severe Sepsis OR Septic Shock Diagnosis Present: No    To be done/followed up on inpatient unit:  monitoring    ED NURSE PHONE NUMBER: [phone]

## 2019-09-12 NOTE — TELEPHONE ENCOUNTER
Notified by nursing staff this morning that resident pulled out reddy catheter. Orders to leave reddy out for a trial of  void. Notified later in the morning that resident had a wet brief mixed with blood. PVR with 360 ml of urine. Order to straight cath which was unsuccessful as staff reported meeting resistance at prostate and the patient was agitated. Temp of 100.6 at that time. Instructed to give the PRN seroquel to allow resident to calm down and reattempt catheter insertion and send specimen for culture.   1325: notified by nursing staff that resident has been lethargic for the past two hours, BP was 60s/40s and he was brought back to bed, legs were elevated and BP 70s/50s. Temp decreased to 99.7 (received tylenol earlier). Concern for sepsis secondary to urinary source so will send to [facility] for further evaluation.     STAS Meier CNP

## 2019-09-12 NOTE — PHARMACY-ADMISSION MEDICATION HISTORY
"Admission medication history interview status for the 9/12/2019  admission is complete. See EPIC admission navigator for prior to admission medications     Medication history source reliability:Tomy, MAR from [facility]    Actions taken by pharmacist (provider contacted, etc):None     Additional medication history information not noted on PTA med list :None    Medication reconciliation/reorder completed by provider prior to medication history? No    Time spent in this activity: 10 minutes    Prior to Admission medications    Medication Sig Last Dose Taking? Auth Provider   camphor-menthol (DERMASARRA) 0.5-0.5 % external lotion Apply topically 2 times daily as needed for skin care Apply a thin layer to rash on chest prn Yes Reported, Patient   citalopram (CELEXA) 10 MG tablet TAKE 1 TABLET BY MOUTH ONCE DAILY 9/12/2019 at 0800 Yes Debbie Plata APRN CNP   fluticasone (FLONASE) 50 MCG/ACT nasal spray INHALE 1 SPRAY IN EACH NOSTRIL DAILY 9/12/2019 at 0800 Yes Debbie Plata APRN CNP   latanoprost (XALATAN) 0.005 % ophthalmic solution INSTILL ONE DROP IN EACH EYE EVERY NIGHT AT BEDTIME 9/11/2019 at 2100 Yes Debbie Plata APRN CNP   levothyroxine (SYNTHROID/LEVOTHROID) 100 MCG tablet TAKE 1 TABLET BY MOUTH ONCE DAILY 9/12/2019 at 0600 Yes Debbie Plata APRN CNP   MAPAP 325 MG tablet TAKE TWO TABLETS (650MG) BY MOUTH FOUR TIMES A DAY AS NEEDED FOR PAIN 9/10/2019 at 1256 Yes Debbie Plata APRN CNP   mirtazapine (REMERON) 15 MG tablet Take 15 mg by mouth At Bedtime 9/11/2019 at 2100 Yes Reported, Patient   multivitamin w/minerals (THERA-VIT-M) tablet Take 1 tablet by mouth At Bedtime 9/11/2019 at 2100 Yes Unknown, Entered By History   oxyCODONE (ROXICODONE) 5 MG tablet Take 0.5-1 tablets (2.5-5 mg) by mouth every 4 hours as needed 1/2 tab po q 4 hrs prn pain scale \"1-5\"  1 tab po q 4 hrs prn pain scale \"6-10\" prn Yes Fabi Roman PA-C polyethylene glycol " (MIRALAX/GLYCOLAX) packet Take 17 g by mouth daily as needed for constipation prn Yes Carmen Abraham PA-C   QUEtiapine (SEROQUEL) 25 MG tablet Take 1 tablet (25 mg) by mouth 2 times daily as needed (agitation, restlessness) 9/12/2019 at 1201 Yes Teetee Reyes MD   senna-docusate (SENOKOT-S/PERICOLACE) 8.6-50 MG tablet Take 2 tablets by mouth 2 times daily as needed for constipation prn Yes Carmen Abraham PA-C   tamsulosin (FLOMAX) 0.4 MG capsule TAKE 1 CAPSULE BY MOUTH ONCE DAILY 9/12/2019 at 0800 Yes Debbie Plaat APRN CNP   VITAMIN D3 1000 units tablet TAKE TWO TABLETS (2000 UNITS) BY MOUTH ONCE DAILY 9/12/2019 at 0800 Yes Debbie Plata APRN CNP

## 2019-09-12 NOTE — ED NOTES
Bed: [facility]  Expected date:   Expected time:   Means of arrival:   Comments:  [facility] [place] - 422 - 78 M hypotensive sepsis eta 1412

## 2019-09-12 NOTE — ED TRIAGE NOTES
Pt brought in by EMS from Nursing home. Pt had recent hip fx. Today staff concerned because pt pulled out reddy, low grade temp and hypotensive

## 2019-09-12 NOTE — ED PROVIDER NOTES
"  History     Chief Complaint:  Fever     The history is provided by the patient.      Elie Marcano is a 78 year old male, with history pertinent for Alzheimer's dementia, who presents via EMS from nursing home with concerns for fever in the setting of hypotension. Per report, patient had a maximal temperature of 100.6F earlier today and staff highlight concerns for UTI. Patient reportedly pulled his own reddy out earlier today. Staff was going to replace his catheter, prompting one dose of Seroquel and Oxycodone, though this resulted in hypotension. En route, lowest pressure was noted to be 67/40 with sugars of 101. Betito repeatedly yelled \"help\" during exam. He denies any pain or nausea. Medics also states patient is at nursing home for hip fracture recovery.     Laboratory work-up from nursing home 9/12/19:  CBC: WBC 6.7 HGB 12.0   BMP: Na 130 Cl 97 o/w WNL (Creatinine 0.79)    Allergies:  No Known Drug Allergies    Medications:    Celexa  Synthroid  Seroquel  Oxycodone    Past Medical History:    Alzheimer disease  BPH  Chronic sinusitis  Cognitive impairment  Hyperlipidemia  Hypertension  Syncope  Thyroid disease  Vitamin D deficiency    Past Surgical History:    Appendectomy  Right hip reduction with pinning     Family History:    No past pertinent family history.    Social History:  Never Smoker  Alcohol Use: No  Marital Status: Single     Review of Systems   Unable to perform ROS: Dementia     Physical Exam     Patient Vitals for the past 24 hrs:   BP Temp Temp src Pulse Heart Rate Resp SpO2   09/12/19 1530 (!) 80/56 -- -- 80 80 16 --   09/12/19 1515 93/58 -- -- 82 81 23 94 %   09/12/19 1500 (!) 84/63 -- -- 80 81 17 99 %   09/12/19 1430 103/52 -- -- 87 93 22 --   09/12/19 1420 103/59 -- -- -- 86 12 100 %   09/12/19 1414 (!) 86/59 99.9  F (37.7  C) Rectal 92 92 18 99 %     Physical Exam  Constitutional: Elderly white male thin, intermittently yelling help  HENT:  Oropharnyx moist   Eyes: Unable to " cooperate with EOM  Neck: Normal range of motion. No JVD present. No cervical adenopathy.  Cardiovascular: Regular rhythm.  Exam reveals no gallop and no friction rub.    No murmur heard.  Pulmonary/Chest: Bilateral breath sounds normal. No wheezes, rhonchi or rales.  Abdominal: Soft. No tenderness. No rebound or guarding.   Genitourinary: Circumcised penis, bilateral descended testes, blood at urethral meatus  Musculoskeletal: No edema. No tenderness.   Lymphadenopathy: No lymphadenopathy.   Neurological: Alert, oriented to self only, no facial assymetry, moves all extremities  Skin: Skin is warm and dry. No rash noted. No erythema.     Emergency Department Course     Imaging:  XR Chest 2 Views  No radiographic evidence of acute chest abnormality.   ANISHA YEBOAH MD  Reading per radiology    Abdomen US, limited (RUQ only)    Gallstones ?cholecystits    Laboratory:  CBC: WBC 4.6, HGB 10.1,   CMP: glc 102 BUN 34 Ca 8.2 Albumin 2.2 protein 5.2 ALKPHOS 183   o/w WNL (Creatinine 1.09)  UA: blood large pH 8.5 protein >600 LE large WBC >182 RBC >182 bacteria many mucous present o/w WNL  Urine culture: Pending  ISTAT VB.40/40/28/25; Lactic Acid (Resulted: 1426): 4.4 ()   Blood culture x2: Pending    Interventions:  1537: NS 1L IV Bolus   1539: Rocephin 1 g in  mL IV infusion  1541: NS 1L IV Bolus   1614: Tylenol 650 mg Rectal  1620: NS 1L IV Bolus     Emergency Department Course:  Nursing notes and vitals reviewed.  1410 I performed an exam of the patient as documented above.   1432 IV was inserted and blood was drawn for laboratory testing, results above.  1438 The patient was sent for a XR while in the emergency department, results above.     Procedure note:  Central line  Patient remained hypotensive after 3 liter NS;  r femoral cvp placed using standard seldinger sterile technique    Procedure note:  Arterial line      Given hypotension right femoral arterial line placed using  "standard sterile seldinger technique     Discussed the patient with Dr. Pringle who will admit the patient to ICU  for further monitoring, evaluation, and treatment.  The patient was sent for a US while in the emergency department, results above.   Patient admitted.       Impression & Plan      Medical Decision Making:  This is 78 year male sent from the nursing facility because of low-grade fever and low blood pressure. He pulled his indwelling catheter. His temperature was as high as 100.6F. On exam, he was crying out \"help me, help me!\" The only abnormality I see is some blood in his urethral meatus, another catheter was placed. Urine is full of blood and white cells. Chest x-ray shows no acute findings. Laboratory shows an elevated lactate and some elevated liver functions. Patient has been started on IV fluids, his pressures remained low after initial antibiotics and 3 liter NS;  Iv levaquin and broad based antibiotics began    Critical care time exclusive of procedures:  35 minutes    Diagnosis:    ICD-10-CM   1. Sepsis, due to unspecified organism (H) A41.9   2. Urinary tract infection associated with indwelling urethral catheter, initial encounter (H) T83.511A   3. Elevated LFTs R94.5     Disposition: Admit ICU    Scribe Disclosure:  Rasheed VELASCO, am serving as a scribe at 2:19 PM on 9/12/2019 to document services personally performed by Coleman Felix MD based on my observations and the provider's statements to me.     EMERGENCY DEPARTMENT       Coleman Felix MD  09/12/19 1823    "

## 2019-09-13 NOTE — CONSULTS
"Care Transition Initial Assessment - RN        Met with: Patient and Caregiver.  DATA   Active Problems:    Septic shock (H)       Cognitive Status: disoriented.        Contact information and PCP information verified: Yes  Lives With: facility resident         Insurance concerns: No Insurance issues identified  ASSESSMENT  Patient currently receives the following services:  ICU cares, was at memory care at [facility].       Identified issues/concerns regarding health management: Patient has declining health.  HCA, Fuad would like a interview set up with [facility]. She does have patient on the waiting list at [facility]. That could occur as soon as Monday.       PLAN  Financial costs for the patient include unsure .  Patient given options and choices for discharge yes .  Patient/family is agreeable to the plan?  Yes:   Patient anticipates discharging to [facility] once available bed.  Writer called [facility], spoke with Muna? She wanted Face sheet, H & P, med list and palliative note. [facility] will call and set up a time with Fuad TSAI to met. Writer will follow along this weekend.   Marion called writer back and stated, \"we will met patient and HCA, Fuad once patient is discharged to [facility], not while patient is in the hospital per fuad's request.\"        Patient anticipates needs for home equipment: Does not know  Transportation/person available to transport on day of discharge  is TBD and have they been notified/set up   Plan/Disposition: LTC/Hospice    Care  (CTS) will continue to follow as needed.            "

## 2019-09-13 NOTE — PROGRESS NOTES
SPIRITUAL HEALTH SERVICES Progress Note  [facility] ICU    Pt sleeping when  attempted visit, no family/friends present at the time.    SH will attempt visit again tomorrow (Saturday).    Garrett Pradhan   Intern

## 2019-09-13 NOTE — PROGRESS NOTES
Critical Care  Note      09/13/2019    Name: Elie Marcano MRN#: [id number]   Age: 78 year old YOB: 1940     [facility] Day# 1  ICU DAY # 0                 Problem List:   Active Problems:    Septic shock (H)           Summary/Hospital Course:     Betito is an 78yoM with PMH dementia, HTN, HLD, BPH, hypothyroid, recent operative repair of hip fracture, admitted to the [facility] ICU for septic shock. Staff of his [facility] nursing home called EMS because he had a fever and had removed his own reddy cath. EMS noted him to be hypotensive.     Initial ED workup was notable for lactate 4.4, pyuria, Hgb 10.1, alb 2.2, alt 1159, ast 281, , WBC 4.6, Cr 1.09. CXR WNL, ED abd U/S gallstones, blood and urine cultures pending. The patient was appropriately fluid resuscitated and was antibiosed with ceftriaxone 1x, vanc and zosyn. He was persistently hypotensive and pressors were initiated through central venous access in right groin. He was transferred to the ICU for further management of septic shock. He arrived to the unit stuporous following administration of oxycodone and seroquel for central line placement.  His dementia limits history taking.    Of note, pt fell and fractured his hip 9/3 which was operatively repaired at [facility], discharged 3 days prior to the present admission.    History is obtained from chart review as patient is unable to communicate his medical history.      Assessment and plan :     Elie Marcano IS a 78 year old male admitted on 9/12/2019 for septic shock.   I have personally reviewed the daily labs, imaging studies, cultures and discussed the case with referring physician and consulting physicians.     My assessment and plan by system for this patient is as follows:    Neurology/Psychiatry:   1. dementia  2. Pain/analgesia  3. Anxiety  4. Delirium  Plan  - delirium precautions per protocol, minimize lines and tele as appropriate, window shades open during the day  -  APAP 650mg  PRN q8h for pain  - cont home celexa --   - haloperidol 2mg IV PRN for agitation  Cardiovascular:   1. Septic shock:  likely 2/2 to urosepsis, s/p appropriate fluid bolus (> 30 ml/kg). On levophed.  Starting vasopressin and stress steroids additionally.  2.Rhythm -  NSR  Plan   - titrate norepinepherine to MAP 65, wean as able  - tele, vitals per protocol  - invasive arterial pressure monitoring while on pressors  -trend lactates    Pulmonary/Ventilator Management:   1. Airway patent and protecting  2. Hypoxemia:  requiring 2L NC.  In setting of septic shock  Plan  -    Wean oxygen as tolerated    GI and Nutrition :   1. Full diet when mental status permits  2. Transaminitis, acutely elevated compared to 3 days prior. Gallstones on abd u/s but no evidence of obstruction, bili WNL.  Suspect 2/2 shock.  Trend.   Plan  -Follow transaminases daily    Renal/Fluids/Electrolytes:   Last Renal Panel:  Sodium   Date Value Ref Range Status   09/13/2019 135 133 - 144 mmol/L Final     Potassium   Date Value Ref Range Status   09/13/2019 3.6 3.4 - 5.3 mmol/L Final     Chloride   Date Value Ref Range Status   09/13/2019 107 94 - 109 mmol/L Final     Carbon Dioxide   Date Value Ref Range Status   09/13/2019 21 20 - 32 mmol/L Final     Anion Gap   Date Value Ref Range Status   09/13/2019 7 3 - 14 mmol/L Final     Glucose   Date Value Ref Range Status   09/13/2019 111 (H) 70 - 99 mg/dL Final     Urea Nitrogen   Date Value Ref Range Status   09/13/2019 29 7 - 30 mg/dL Final     Creatinine   Date Value Ref Range Status   09/13/2019 0.82 0.66 - 1.25 mg/dL Final     GFR Estimate   Date Value Ref Range Status   09/13/2019 83 >60 mL/min/[1.73_m2] Final     Comment:     Non  GFR Calc  Starting 12/18/2018, serum creatinine based estimated GFR (eGFR) will be   calculated using the Chronic Kidney Disease Epidemiology Collaboration   (CKD-EPI) equation.       Calcium   Date Value Ref Range Status   09/13/2019 7.6 (L) 8.5 - 10.1  mg/dL Final     Phosphorus   Date Value Ref Range Status   09/13/2019 3.0 2.5 - 4.5 mg/dL Final     Albumin   Date Value Ref Range Status   09/13/2019 2.1 (L) 3.4 - 5.0 g/dL Final       Plan  - monitor function and electrolytes as needed with replacement per ICU protocols. - generally avoid nephrotoxic agents such as NSAID, IV contrast unless specifically required  - adjust medications as needed for renal clearance  - I/O      Intake/Output Summary (Last 24 hours) at 9/13/2019 1332  Last data filed at 9/13/2019 1200  Gross per 24 hour   Intake 4250.99 ml   Output 1725 ml   Net 2525.99 ml         Infectious Disease:   1. Urosepsis, urine gross blood and pyuria, culture pending, lactate decreasing, vanc/zosyn + ceftriaxone x1  Plan  - cont vanc/zosyn, narrow per cultures/sensitivities. Preliminary results with Proteus from blood cultures      Endocrine:   1. Hypothyroidism, chronic  2. Normal glucose  Plan  - ICU insulin protocol, goal sugar <180  - home levothyroxine      Hematology/Oncology:   CBC RESULTS:   Recent Labs   Lab Test 09/13/19  0420   WBC 22.2*   RBC 2.98*   HGB 9.9*   HCT 28.6*   MCV 96   MCH 33.2*   MCHC 34.6   RDW 14.5   *       Plan  - daily CBC     IV/Access:   1. Venous access - triple lumen central line right fem + L AC and R hand PIV  2. Arterial access - R fem a line  Plan  - central access required and necessary  - A-line while on pressors    ICU Prophylaxis:   1. DVT: heparin/mechanical  2. VAP: not indicated  3. Stress Ulcer: PPI  4. Restraints: Nonviolent soft two point restraints required and necessary for patient safety and continued cares and good effect as patient continues to pull at necessary lines, tubes despite education and distraction. Will readdress daily.   5. Wound care  - R hip, monitor  6. Feeding - full diet, PO  7. Family Update: see below  8. Disposition - to medical floor when off pressors     Family update: I had a lengthy discussion with the patient's POA.  She  confirmed that the patient is DNR/DNI, and she would like to meet with palliative care to come up with a plan not to hospitalize the patient in the future.  I connected her with palliative care and with the care coordinator in hopes of arranging transfer to [facility] after hospitalization, and possibly to commence hospice care.    Key goals for next 24 hours:   1. Wean pressors  2. continue atb  3. Trend lactate  4. F/U cultures  5. Arrange visits with palliative care and care coordination.               Medical History:     Past Medical History:   Diagnosis Date     Alzheimer disease      BPH (benign prostatic hyperplasia)      Chronic sinusitis      Cognitive impairment      Elevated PSA      Hyperlipidemia      Hypertension      Syncope      Thyroid disease     Graves disease, Multinodular goiter     Vitamin D deficiency      Weight loss      Past Surgical History:   Procedure Laterality Date     APPENDECTOMY       CLOSED REDUCTION, PERCUTANEOUS PINNING HIP Right 9/4/2019    Procedure: CLOSED REDUCTION RIGHT HIP, WITH PERCUTANEOUS SCREWS RIGHT HIP;  Surgeon: Last Frank MD;  Location:  OR     Social History     Socioeconomic History     Marital status: Single     Spouse name: Not on file     Number of children: Not on file     Years of education: Not on file     Highest education level: Not on file   Occupational History     Not on file   Social Needs     Financial resource strain: Not on file     Food insecurity:     Worry: Not on file     Inability: Not on file     Transportation needs:     Medical: Not on file     Non-medical: Not on file   Tobacco Use     Smoking status: Never Smoker     Smokeless tobacco: Never Used   Substance and Sexual Activity     Alcohol use: No     Drug use: No     Sexual activity: Never   Lifestyle     Physical activity:     Days per week: Not on file     Minutes per session: Not on file     Stress: Not on file   Relationships     Social connections:     Talks on phone: Not on  file     Gets together: Not on file     Attends Che[employer] service: Not on file     Active member of club or organization: Not on file     Attends meetings of clubs or organizations: Not on file     Relationship status: Not on file     Intimate partner violence:     Fear of current or ex partner: Not on file     Emotionally abused: Not on file     Physically abused: Not on file     Forced sexual activity: Not on file   Other Topics Concern     Parent/sibling w/ CABG, MI or angioplasty before 65F 55M? Not Asked   Social History Narrative     Not on file      No Known Allergies           Key Medications:       citalopram  10 mg Oral Daily     heparin  5,000 Units Subcutaneous Q8H     hydrocortisone sodium succinate PF  50 mg Intravenous Q6H     levothyroxine  100 mcg Oral QAM AC     piperacillin-tazobactam  4.5 g Intravenous Q6H       norepinephrine 0.05 mcg/kg/min (09/13/19 0831)     sodium chloride 50 mL/hr at 09/13/19 0805     vasopressin (PITRESSIN) infusion ADULT (40 mL) 2.4 Units/hr (09/13/19 1319)        Home Meds    No current facility-administered medications on file prior to encounter.   Current Outpatient Medications on File Prior to Encounter:  camphor-menthol (DERMASARRA) 0.5-0.5 % external lotion Apply topically 2 times daily as needed for skin care Apply a thin layer to rash on chest   citalopram (CELEXA) 10 MG tablet TAKE 1 TABLET BY MOUTH ONCE DAILY   fluticasone (FLONASE) 50 MCG/ACT nasal spray INHALE 1 SPRAY IN EACH NOSTRIL DAILY   latanoprost (XALATAN) 0.005 % ophthalmic solution INSTILL ONE DROP IN EACH EYE EVERY NIGHT AT BEDTIME   levothyroxine (SYNTHROID/LEVOTHROID) 100 MCG tablet TAKE 1 TABLET BY MOUTH ONCE DAILY   MAPAP 325 MG tablet TAKE TWO TABLETS (650MG) BY MOUTH FOUR TIMES A DAY AS NEEDED FOR PAIN   mirtazapine (REMERON) 15 MG tablet Take 15 mg by mouth At Bedtime   multivitamin w/minerals (THERA-VIT-M) tablet Take 1 tablet by mouth At Bedtime   oxyCODONE (ROXICODONE) 5 MG tablet Take  "0.5-1 tablets (2.5-5 mg) by mouth every 4 hours as needed 1/2 tab po q 4 hrs prn pain scale \"1-5\"  1 tab po q 4 hrs prn pain scale \"6-10\"   polyethylene glycol (MIRALAX/GLYCOLAX) packet Take 17 g by mouth daily as needed for constipation   QUEtiapine (SEROQUEL) 25 MG tablet Take 1 tablet (25 mg) by mouth 2 times daily as needed (agitation, restlessness)   senna-docusate (SENOKOT-S/PERICOLACE) 8.6-50 MG tablet Take 2 tablets by mouth 2 times daily as needed for constipation   tamsulosin (FLOMAX) 0.4 MG capsule TAKE 1 CAPSULE BY MOUTH ONCE DAILY   VITAMIN D3 1000 units tablet TAKE TWO TABLETS (2000 UNITS) BY MOUTH ONCE DAILY              Physical Examination:   Temp:  [97.6  F (36.4  C)-99.9  F (37.7  C)] 98  F (36.7  C)  Pulse:  [62-96] 64  Heart Rate:  [45-97] 46  Resp:  [7-23] 12  BP: ()/(33-78) 133/55  MAP:  [54 mmHg-141 mmHg] 80 mmHg  Arterial Line BP: ()/() 144/50  SpO2:  [84 %-100 %] 100 %    Intake/Output Summary (Last 24 hours) at 9/12/2019 2013  Last data filed at 9/12/2019 1913  Gross per 24 hour   Intake 3200 ml   Output --   Net 3200 ml     Wt Readings from Last 4 Encounters:   09/13/19 63.4 kg (139 lb 12.4 oz)   09/09/19 59.9 kg (132 lb)   07/23/19 59.9 kg (132 lb)   05/21/19 62.6 kg (138 lb)     Arterial Line BP: ()/() 144/50  MAP:  [54 mmHg-141 mmHg] 80 mmHg  BP - Mean:  [] 88  Resp: 12    No lab results found in last 7 days.    GEN: no acute distress, sedated vs encephalopathic--only says \"help\" to sternal rub, doesn't arouse.  HEENT: head ncat, sclera anicteric, OP patent, trachea midline   PULM: unlabored, breath sounds normal, lung sounds clear and equal bilaterally    CV/COR: RRR S1S2, no extra sounds  ABD: soft nontender, no masses  EXT:  No edema,   Warm, onychomycosis   NEURO: intermittently cries out for help, sedate, moves all four extremities  SKIN: no obvious rash, r hip wound, sacral prophylactic dressing in place  LINES: clean, dry intact, no dressing " interruptions         Data:   All data and imaging reviewed     ROUTINE ICU LABS (Last four results)  CMP  Recent Labs   Lab 09/13/19  0420 09/12/19  1432 09/09/19  0557 09/08/19  0649    134 131* 132*   POTASSIUM 3.6 4.0 4.0 3.8   CHLORIDE 107 103 98 100   CO2 21 23 30 29   ANIONGAP 7 8 3 3   * 102* 101* 99   BUN 29 34* 19 17   CR 0.82 1.09 0.77 0.69   GFRESTIMATED 83 63 85 89   GFRESTBLACK >90 73 >90 >90   RONALD 7.6* 8.2* 8.3* 8.2*   MAG 1.7  --   --   --    PHOS 3.0  --   --   --    PROTTOTAL 5.1* 5.2* 6.0* 5.6*   ALBUMIN 2.1* 2.2* 2.7* 2.5*   BILITOTAL 0.9 1.0 0.5 0.5   ALKPHOS 134 183* 57 51   * 281* 29 20   * 159* 27 16     CBC  Recent Labs   Lab 09/13/19  0420 09/13/19  0020 09/12/19  1432 09/09/19  0557 09/08/19  0649   WBC 22.2*  --  4.6 4.8 4.3   RBC 2.98*  --  3.07* 3.31* 3.40*   HGB 9.9*  --  10.1* 10.9* 11.2*   HCT 28.6*  --  29.8* 32.2* 33.1*   MCV 96  --  97 97 97   MCH 33.2*  --  32.9 32.9 32.9   MCHC 34.6  --  33.9 33.9 33.8   RDW 14.5  --  14.3 14.1 14.3   * 148* 133* 153 150     INRNo lab results found in last 7 days.  Arterial Blood GasNo lab results found in last 7 days.    All cultures:  Recent Labs   Lab 09/12/19  1434 09/12/19  1430   CULT >100,000 colonies/mL  Lactose fermenting gram negative rods  *  >100,000 colonies/mL  Non lactose fermenting gram negative rods  *  Culture in progress Cultured on the 1st day of incubation:  Gram negative rods  *  Critical Value/Significant Value, preliminary result only, called to and read back by  Rene Nair Medical Student/April Machado [facility] 0634 09.13.19 CF     (Note)  POSITIVE for PROTEUS SPECIES by Verigene multiplex nucleic acid test.  Final identification and antimicrobial susceptibility testing will be  verified by standard methods.    Specimen tested with Verigene multiplex, gram-negative blood culture  nucleic acid test for the following targets: Acinetobacter sp.,  Citrobacter sp., Enterobacter  sp., Proteus sp., E. coli, K.  pneumoniae/oxytoca, P. aeruginosa, and the following resistance  markers: CTXM, KPC, NDM, VIM, IMP and OXA.    Critical Value/Significant Value called to and read back by KATYA CLINE  RN @0918 9/13/19. SCG      Cultured on the 1st day of incubation:  Gram negative rods  *  Critical Value/Significant Value, preliminary result only, called to and read back by  Rene Nair Medical Student/April Machado RN [facility] 0635 09.13.19 CF       Recent Results (from the past 24 hour(s))   XR Chest 2 Views    Narrative    CHEST TWO VIEWS 9/12/2019 2:55 PM     HISTORY:  Fever.    COMPARISON: 9/3/2019    FINDINGS: The lungs are hyperexpanded but clear. No pneumothorax or  pleural effusion. Heart size similar to prior.      Impression    IMPRESSION: No radiographic evidence of acute chest abnormality.     ANISHA YEBOAH MD   Abdomen US, limited (RUQ only)    Narrative    ULTRASOUND ABDOMEN LIMITED  9/12/2019 4:54 PM     HISTORY: Elevated LFTs, fever, as above.    COMPARISON: None.    FINDINGS:  Liver is unremarkable in echogenicity without focal  lesions. Gallbladder demonstrates cholelithiasis without gallbladder  wall thickening. Possible pericholecystic fluid. Extrahepatic bile  duct is normal in diameter. Pancreas is normal where visualized. Right  kidney is normal in size. There is no hydronephrosis.      Impression    IMPRESSION:    1. Cholelithiasis without evidence for cholecystitis.   2. Possible complex fluid around the gallbladder which is nonspecific.  Even though there is no gallbladder wall thickening, cholecystitis  would be difficult to exclude in this setting.    GABRIELLA BLANCAS MD                                      Billing: This patient is critically ill: Yes. Total critical care time today 60 min.    Lazaro Canales MD

## 2019-09-13 NOTE — PROVIDER NOTIFICATION
Notified Dr Burton regarding + BC gram negative rods in L arm and Rt hand collected on 9/12/19 at 2:30pm.

## 2019-09-13 NOTE — CONSULTS
[facility]  Palliative Care Consultation Note    Patient: Elie Marcano  Date of Admission:  9/12/2019    Requesting Clinician / Team: FELIX/Dr Pringle  Reason for consult: Goals of care    Recommendations:    Continue current care plan over the weekend.    Anticipate discharge to [facility], hopefully Monday    Have SW ask [facility] (preferred hospice provider of [facility] and pt's [facility]) evaluate patient, prior to discharge if possible for hospice admission.    If not eligible for hospice services, anticipate discharge to [facility] with DNH order, comfort-focused POC there, involvement of hospice care when decline occurs     Discussion:  I spoke at length today with Dottie healthcare agent and longtime friend Shayla.  She is the person who has remained most consistently involved in his life since his dementia diagnosis.  Speaking as his healthcare agent, she requests transition to a comfort focused pathway of care when he has finished his antibiotics and gotten off his pressors.  This will involve the steps outlined above.    These recommendations have been discussed with Dr Pringle, FELIX RN CC.      Thank you for the opportunity to participate in the care of this patient and family. Our team: will continue to follow.     During regular M-F work hours -- if you are not sure who specifically to contact -- please contact us by sending a text page to our team consult pager at [phone].    After regular work hours and on weekends/holidays, you can call our answering service at [phone]. Also, who's on call for us is available in [facility] Smart Web.     Edmund Canales MD  Palliative Medicine Consult Team  Pager: [phone]   TT: 71 minutes, with > 50% spent in C/C/E patient/family/care teams re: GOC, POC, Sx management. [id number]  Assessments:  Elie Marcano is an 78yoM with advanced dementia, also with HTN, HLD, BPH, hypothyroidism. He underwent recent operative repair of hip fracture,  with subsequent TCU stay at [facility], now admitted to the [facility] ICU from TCU for septic shock after pulling out his reddy.   Today, the patient was seen for:  GOC planning, dementia, sepsis, family support    Prognosis, Goals, & Planning:      Functional Status just prior to hospitalization: 4 (Completely disabled and dependent on others for selfcare; bedbound)      Prognosis, Goals, and/or Advance Care Planning were addressed today: Yes        Summary/Comments: See comments below.      Patient's decision making preferences: unable to assess Betito suffers from advanced dementia and is unable to participate in decision-making process.          Patient has decision-making capacity today for complex decisions: No            I have concerns about the patient/family's health literacy today: No           Patient has a completed Health Care Directive: Reportedly yes, but not available to us currently.      Code status: DNR/DNI    Coping, Meaning, & Spirituality:   Mood, coping, and/or meaning in the context of serious illness were addressed today: Yes  Summary/Comments: He finds meaning in his [employer] maikel although he has limited ability to still process these issues.    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  His longtime friend/healthcare agent Shayla is actively involved.  He has a brother who lives in [place] who has had limited involvement with Betito over recent years.    History of Present Illness:  Over the past several days Dottie need for pressors has been decreasing and he has been responding to antibiotic therapy.  Discussion today with healthcare agent was related primarily to discharge goals and potential hospice enrollment.  She hopes Bettio will survive this hospitalization to a point of stability where he can be discharged to [facility].    Key Palliative Symptom Data:  # Pain severity the last 12 hours: none  # Dyspnea severity the last 12 hours: none  # Nausea severity  the last 12 hours: none  # Anxiety severity the last 12 hours: low    ROS:  Comprehensive ROS is reviewed and is negative except as here & per HPI: He appears comfortable, denies pain, does not appear to be dyspneic, no cough.  No restlessness or agitation     Past Medical History:  Past Medical History:   Diagnosis Date     Alzheimer disease      BPH (benign prostatic hyperplasia)      Chronic sinusitis      Cognitive impairment      Elevated PSA      Hyperlipidemia      Hypertension      Syncope      Thyroid disease     Graves disease, Multinodular goiter     Vitamin D deficiency      Weight loss         Past Surgical History:  Past Surgical History:   Procedure Laterality Date     APPENDECTOMY       CLOSED REDUCTION, PERCUTANEOUS PINNING HIP Right 2019    Procedure: CLOSED REDUCTION RIGHT HIP, WITH PERCUTANEOUS SCREWS RIGHT HIP;  Surgeon: Last Frank MD;  Location:  OR         Family History:  Family History   Family history unknown: Yes   Both parents are .     Allergies:  No Known Allergies     Medications:  I have reviewed this patient's medication profile and medications from this hospitalization.   Levophed  Zosyn    Physical Exam:  Vital Signs: Temp: 98  F (36.7  C) Temp src: Axillary BP: 133/55 Pulse: 64 Heart Rate: (!) 46 Resp: 12 SpO2: 100 % O2 Device: Nasal cannula Oxygen Delivery: 2 LPM  Weight: 139 lbs 12.35 oz  GEN: Awakens to voice   EYES: Sclera non-icteric  EARS: Eumorphic bilaterally  NOSE: No significant nasal drainage  MOUTH: Oral mucosa moist, no evidence of thrush  LUNGS: No increased WOB or accessory muscle use  CV: Regular radial pulse 72  ABD: soft  EXTR: Tr BLE edema  SKIN: Warm, dry  NEUROPSYCH: Responds to voice    Data reviewed:  ROUTINE ICU LABS (Last four results)  CMP  Recent Labs   Lab 19  0420 19  1432 19  0557 19  0649    134 131* 132*   POTASSIUM 3.6 4.0 4.0 3.8   CHLORIDE 107 103 98 100   CO2 21 23 30 29   ANIONGAP 7 8 3 3   GLC  111* 102* 101* 99   BUN 29 34* 19 17   CR 0.82 1.09 0.77 0.69   GFRESTIMATED 83 63 85 89   GFRESTBLACK >90 73 >90 >90   RONALD 7.6* 8.2* 8.3* 8.2*   MAG 1.7  --   --   --    PHOS 3.0  --   --   --    PROTTOTAL 5.1* 5.2* 6.0* 5.6*   ALBUMIN 2.1* 2.2* 2.7* 2.5*   BILITOTAL 0.9 1.0 0.5 0.5   ALKPHOS 134 183* 57 51   * 281* 29 20   * 159* 27 16     CBC  Recent Labs   Lab 09/13/19  0420 09/13/19  0020 09/12/19  1432 09/09/19  0557 09/08/19  0649   WBC 22.2*  --  4.6 4.8 4.3   RBC 2.98*  --  3.07* 3.31* 3.40*   HGB 9.9*  --  10.1* 10.9* 11.2*   HCT 28.6*  --  29.8* 32.2* 33.1*   MCV 96  --  97 97 97   MCH 33.2*  --  32.9 32.9 32.9   MCHC 34.6  --  33.9 33.9 33.8   RDW 14.5  --  14.3 14.1 14.3   * 148* 133* 153 150     INRNo lab results found in last 7 days.  Arterial Blood GasNo lab results found in last 7 days.

## 2019-09-13 NOTE — PROGRESS NOTES
Pt disoriented and confused. VSS on 2 LNC. MAP goal >65, vaso weaned, norepi gtt at low dose. LS diminished. Bloody urine improved, adequate urine output. NPO. Abd US + gallstones. WBC increased from 4.6 to 22.2 this am. +BC. Will cont to closely monitor.

## 2019-09-13 NOTE — H&P
Critical Care  Note      09/12/2019    Name: Elie Marcano MRN#: [id number]   Age: 78 year old YOB: 1940     [facility]  ICU DAY # 0                 Problem List:   Active Problems:    Septic shock (H)           Summary/Hospital Course:     Betito is an 78yoM with PMH dementia, HTN, HLD, BPH, hypothyroid, recent operative repair of hip fracture, admitted to the [facility] ICU for septic shock. Staff of his [facility] nursing home called EMS because he had a fever and had removed his own reddy cath. EMS noted him to be hypotensive.     Initial ED workup was notable for lactate 4.4, pyuria, Hgb 10.1, alb 2.2, alt 1159, ast 281, , WBC 4.6, Cr 1.09. CXR WNL, ED abd U/S gallstones, blood and urine cultures pending. The patient was appropriately fluid resuscitated and was antibiosed with ceftriaxone 1x, vanc and zosyn. He was persistently hypotensive and pressors were initiated through central venous access in right groin. He was transferred to the ICU for further management of septic shock. He arrived to the unit stuporous following administration of oxycodone and seroquel for central line placement.  His dementia limits history taking.    Of note, pt fell and fractured his hip 9/3 which was operatively repaired at [facility], discharged 3 days prior to the present admission.    History is obtained from chart review as patient is unable to communicate his medical history.      Assessment and plan :     Elie Marcano IS a 78 year old male admitted on 9/12/2019 for septic shock.   I have personally reviewed the daily labs, imaging studies, cultures and discussed the case with referring physician and consulting physicians.     My assessment and plan by system for this patient is as follows:    Neurology/Psychiatry:   1. dementia  2. Pain/analgesia  3. Anxiety  4. Delirium  Plan  - delirium precautions per protocol, minimize lines and tele as appropriate, window shades open during the day  -  APAP 650mg  PRN q8h for pain  - cont home celexa -- hold remeron for tonight as pt is quite drowsy here after seroquel and oxycodone  - haloperidol 2mg IV PRN for agitation/delerium.  Can switch back to home seroquel if/when taking PO reliably.    Cardiovascular:   1. Septic shock:  likely 2/2 to UTI, s/p appropriate fluid bolus (> 30 ml/kg). On levophed.  Starting vasopressin and stress steroids additionally.  2.Rhythm -  NSR  Plan   - titrate norepinepherine to MAP 65, wean as able  - tele, vitals per protocol  - invasive arterial pressure monitoring while on pressors  -trend lactates    Pulmonary/Ventilator Management:   1. Airway patent and protecting  2. Hypoxemia:  requiring 2L NC.  In setting of septic shock  Plan  -    Wean oxygen as tolerated    GI and Nutrition :   1. Full diet when mental status permits  2. Transaminitis, acutely elevated compared to 3 days prior. Gallstones on abd u/s but no evidence of obstruction, bili WNL.  Suspect 2/2 shock.  Trend.   Plan  -Follow transaminases daily    Renal/Fluids/Electrolytes:   1. YA:  Creat 1.09 today is up from baseline 0.77.  Trend creat and UOP.  2. Hypocalcemia, stable from prior hospitalization--likely due to low albumin 2.2  3. Mild metabolic acidosis--likely due to ya noted above.  7.30/37.  4. euvolemic after fluid resuscitation  Plan  - monitor function and electrolytes as needed with replacement per ICU protocols. - generally avoid nephrotoxic agents such as NSAID, IV contrast unless specifically required  - adjust medications as needed for renal clearance  - I/O    Infectious Disease:   1. UTI, urine gross blood and pyuria, culture pending, lactate down to 3.6 from 4.4, vanc/zosyn + ceftriaxone x1  Plan  - cont vanc/zosyn, narrow per cultures/sensitivities  - can consider further GB workup if not clinically responding to management    Endocrine:   1. Hypothyroidism, chronic  2. Normal glucose  Plan  - ICU insulin protocol, goal sugar <180  - home  levothyroxine      Hematology/Oncology:   1. Anemia, no signs, symptoms of active blood loss, stable from previous hospitalization, associated with operative hip fracture repair.  Stable at 10.1  2.  pltlts ok 133  3.  Wbc normal 4.6  Plan  - daily CBC     IV/Access:   1. Venous access - triple lumen central line right fem + L AC and R hand PIV  2. Arterial access - R fem a line  Plan  - central access required and necessary  - A-line while one pressors    ICU Prophylaxis:   1. DVT: heparin/mechanical  2. VAP: not indicated  3. Stress Ulcer: PPI  4. Restraints: Nonviolent soft two point restraints required and necessary for patient safety and continued cares and good effect as patient continues to pull at necessary lines, tubes despite education and distraction. Will readdress daily.   5. Wound care  - R hip, monitor  6. Feeding - full diet, PO  7. Family Update: see below  8. Disposition - to medical floor when off pressors     Family update:  Mr. Marcano is unable to make his own medical decisions and I needed input from his family to guide medical care.  I contacted his longtime friend and designated medical decision maker Shayla Mitchell by phone and updated her as to his critically ill status.  She confirmed that he is DNR/DNI, and we had a long discussion about his condition and prognosis.  She seems uncertain that he would want any aggressive measures at all, and we discussed possible hospital/recovery trajectories if he were to: 1) continue with measures as present, 2) limit cares to antibiotics only (no pressors), or 3) transition to comfort measures only.  In all cases he carries a risk of death, but there is also a chance of recovery.  She wants to discuss it with his family (apparently he has a brother and some other family, but they have designated her the primary decision maker) before making any decisions to de-escalate cares.    Key goals for next 24 hours:   1. Wean pressors  2. continue atb  3. Trend  lactate  4. F/U cultures               Medical History:     Past Medical History:   Diagnosis Date     Alzheimer disease      BPH (benign prostatic hyperplasia)      Chronic sinusitis      Cognitive impairment      Elevated PSA      Hyperlipidemia      Hypertension      Syncope      Thyroid disease     Graves disease, Multinodular goiter     Vitamin D deficiency      Weight loss      Past Surgical History:   Procedure Laterality Date     APPENDECTOMY       CLOSED REDUCTION, PERCUTANEOUS PINNING HIP Right 9/4/2019    Procedure: CLOSED REDUCTION RIGHT HIP, WITH PERCUTANEOUS SCREWS RIGHT HIP;  Surgeon: Last Frank MD;  Location:  OR     Social History     Socioeconomic History     Marital status: Single     Spouse name: Not on file     Number of children: Not on file     Years of education: Not on file     Highest education level: Not on file   Occupational History     Not on file   Social Needs     Financial resource strain: Not on file     Food insecurity:     Worry: Not on file     Inability: Not on file     Transportation needs:     Medical: Not on file     Non-medical: Not on file   Tobacco Use     Smoking status: Never Smoker     Smokeless tobacco: Never Used   Substance and Sexual Activity     Alcohol use: No     Drug use: No     Sexual activity: Never   Lifestyle     Physical activity:     Days per week: Not on file     Minutes per session: Not on file     Stress: Not on file   Relationships     Social connections:     Talks on phone: Not on file     Gets together: Not on file     Attends Gnosticist service: Not on file     Active member of club or organization: Not on file     Attends meetings of clubs or organizations: Not on file     Relationship status: Not on file     Intimate partner violence:     Fear of current or ex partner: Not on file     Emotionally abused: Not on file     Physically abused: Not on file     Forced sexual activity: Not on file   Other Topics Concern     Parent/sibling w/ CABG,  "MI or angioplasty before 65F 55M? Not Asked   Social History Narrative     Not on file      No Known Allergies           Key Medications:       heparin  5,000 Units Subcutaneous Q8H     [START ON 9/13/2019] piperacillin-tazobactam  3.375 g Intravenous Q6H       norepinephrine       sodium chloride          Home Meds    No current facility-administered medications on file prior to encounter.   Current Outpatient Medications on File Prior to Encounter:  camphor-menthol (DERMASARRA) 0.5-0.5 % external lotion Apply topically 2 times daily as needed for skin care Apply a thin layer to rash on chest   citalopram (CELEXA) 10 MG tablet TAKE 1 TABLET BY MOUTH ONCE DAILY   fluticasone (FLONASE) 50 MCG/ACT nasal spray INHALE 1 SPRAY IN EACH NOSTRIL DAILY   latanoprost (XALATAN) 0.005 % ophthalmic solution INSTILL ONE DROP IN EACH EYE EVERY NIGHT AT BEDTIME   levothyroxine (SYNTHROID/LEVOTHROID) 100 MCG tablet TAKE 1 TABLET BY MOUTH ONCE DAILY   MAPAP 325 MG tablet TAKE TWO TABLETS (650MG) BY MOUTH FOUR TIMES A DAY AS NEEDED FOR PAIN   mirtazapine (REMERON) 15 MG tablet Take 15 mg by mouth At Bedtime   multivitamin w/minerals (THERA-VIT-M) tablet Take 1 tablet by mouth At Bedtime   oxyCODONE (ROXICODONE) 5 MG tablet Take 0.5-1 tablets (2.5-5 mg) by mouth every 4 hours as needed 1/2 tab po q 4 hrs prn pain scale \"1-5\"  1 tab po q 4 hrs prn pain scale \"6-10\"   polyethylene glycol (MIRALAX/GLYCOLAX) packet Take 17 g by mouth daily as needed for constipation   QUEtiapine (SEROQUEL) 25 MG tablet Take 1 tablet (25 mg) by mouth 2 times daily as needed (agitation, restlessness)   senna-docusate (SENOKOT-S/PERICOLACE) 8.6-50 MG tablet Take 2 tablets by mouth 2 times daily as needed for constipation   tamsulosin (FLOMAX) 0.4 MG capsule TAKE 1 CAPSULE BY MOUTH ONCE DAILY   VITAMIN D3 1000 units tablet TAKE TWO TABLETS (2000 UNITS) BY MOUTH ONCE DAILY              Physical Examination:   Temp:  [99.9  F (37.7  C)] 99.9  F (37.7  C)  Pulse: " " [80-96] 83  Heart Rate:  [68-97] 69  Resp:  [7-23] 15  BP: ()/(33-68) 90/60  MAP:  [54 mmHg-87 mmHg] 68 mmHg  Arterial Line BP: ()/(41-75) 94/53  SpO2:  [84 %-100 %] 100 %    Intake/Output Summary (Last 24 hours) at 9/12/2019 2013  Last data filed at 9/12/2019 1913  Gross per 24 hour   Intake 3200 ml   Output --   Net 3200 ml     Wt Readings from Last 4 Encounters:   09/09/19 59.9 kg (132 lb)   07/23/19 59.9 kg (132 lb)   05/21/19 62.6 kg (138 lb)   03/14/19 67.6 kg (149 lb)     Arterial Line BP: ()/(41-75) 94/53  MAP:  [54 mmHg-87 mmHg] 68 mmHg  BP - Mean:  [42-79] 76  Resp: 15    No lab results found in last 7 days.    GEN: no acute distress, sedated vs encephalopathic--only says \"help\" to sternal rub, doesn't arouse.  HEENT: head ncat, sclera anicteric, OP patent, trachea midline   PULM: unlabored, breath sounds normal, lung sounds clear and equal bilaterally    CV/COR: RRR S1S2, no extra sounds  ABD: soft nontender, no masses  EXT:  No edema,   Warm, onychomycosis   NEURO: intermittently cries out for help, sedate, moves all four extremities  SKIN: no obvious rash, r hip wound, sacral prophylactic dressing in place  LINES: clean, dry intact, no dressing interruptions         Data:   All data and imaging reviewed     ROUTINE ICU LABS (Last four results)  CMP  Recent Labs   Lab 09/12/19  1432 09/09/19  0557 09/08/19  0649 09/07/19  0658    131* 132* 135   POTASSIUM 4.0 4.0 3.8 3.9   CHLORIDE 103 98 100 100   CO2 23 30 29 31   ANIONGAP 8 3 3 4   * 101* 99 95   BUN 34* 19 17 13   CR 1.09 0.77 0.69 0.73   GFRESTIMATED 63 85 89 87   GFRESTBLACK 73 >90 >90 >90   RONALD 8.2* 8.3* 8.2* 8.9   PROTTOTAL 5.2* 6.0* 5.6*  --    ALBUMIN 2.2* 2.7* 2.5*  --    BILITOTAL 1.0 0.5 0.5  --    ALKPHOS 183* 57 51  --    * 29 20  --    * 27 16  --      CBC  Recent Labs   Lab 09/12/19  1432 09/09/19  0557 09/08/19  0649 09/07/19  0658   WBC 4.6 4.8 4.3 4.3   RBC 3.07* 3.31* 3.40* 3.46*   HGB " 10.1* 10.9* 11.2* 11.2*   HCT 29.8* 32.2* 33.1* 33.8*   MCV 97 97 97 98   MCH 32.9 32.9 32.9 32.4   MCHC 33.9 33.9 33.8 33.1   RDW 14.3 14.1 14.3 14.3   * 153 150 125*     INRNo lab results found in last 7 days.  Arterial Blood GasNo lab results found in last 7 days.    All cultures:  No results for input(s): CULT in the last 168 hours.  Recent Results (from the past 24 hour(s))   XR Chest 2 Views    Narrative    CHEST TWO VIEWS 9/12/2019 2:55 PM     HISTORY:  Fever.    COMPARISON: 9/3/2019    FINDINGS: The lungs are hyperexpanded but clear. No pneumothorax or  pleural effusion. Heart size similar to prior.      Impression    IMPRESSION: No radiographic evidence of acute chest abnormality.     ANISHA YEBOAH MD   Abdomen US, limited (RUQ only)    Narrative    ULTRASOUND ABDOMEN LIMITED  9/12/2019 4:54 PM     HISTORY: Elevated LFTs, fever, as above.    COMPARISON: None.    FINDINGS:  Liver is unremarkable in echogenicity without focal  lesions. Gallbladder demonstrates cholelithiasis without gallbladder  wall thickening. Possible pericholecystic fluid. Extrahepatic bile  duct is normal in diameter. Pancreas is normal where visualized. Right  kidney is normal in size. There is no hydronephrosis.      Impression    IMPRESSION:    1. Cholelithiasis without evidence for cholecystitis.   2. Possible complex fluid around the gallbladder which is nonspecific.  Even though there is no gallbladder wall thickening, cholecystitis  would be difficult to exclude in this setting.    MD Rene ZAMORA, MS4    Physician Attestation   I, Price Burton, was present with the medical student who participated in the service and in the documentation of the note.  I have verified the history and personally performed the physical exam and medical decision making.  I agree with the assessment and plan of care as documented in the note.      I personally reviewed vital signs, medications,  labs and imaging.    See my edits to the note above in blue.  Labs (personally reviewed/interpreted):  See a/p  CXR (personally reviewed/interpreted):  Lungs clear  Abd US:  As noted in a/p.    Billing: This patient is critically ill: Yes. Total critical care time today 60 min.    Price Burton MD  Date of Service (when I saw the patient): 09/12/19

## 2019-09-13 NOTE — PHARMACY-ADMISSION MEDICATION HISTORY
Pharmacy Vancomycin Initial Note  Date of Service 2019  Patient's  1940  78 year old, male    Indication: Sepsis    Current estimated CrCl = CrCl cannot be calculated (Unknown ideal weight.).    Creatinine for last 3 days  2019:  2:32 PM Creatinine 1.09 mg/dL    Recent Vancomycin Level(s) for last 3 days  No results found for requested labs within last 72 hours.      Vancomycin IV Administrations (past 72 hours)                   vancomycin (VANCOCIN) 1000 mg in dextrose 5% 200 mL PREMIX (mg) 1,000 mg New Bag 19 1900                Nephrotoxins and other renal medications (From now, onward)    Start     Dose/Rate Route Frequency Ordered Stop    19 1600  vancomycin 1250 mg in 0.9% NaCl 250 mL intermittent infusion 1,250 mg      1,250 mg  over 90 Minutes Intravenous EVERY 24 HOURS 19 0000  piperacillin-tazobactam (ZOSYN) 3.375 g vial to attach to  mL bag      3.375 g  over 30 Minutes Intravenous EVERY 6 HOURS 19  norepinephrine (LEVOPHED) 16 mg in  mL infusion      0.03-0.4 mcg/kg/min × 59.9 kg  1.7-22.5 mL/hr  Intravenous CONTINUOUS 19            Contrast Orders - past 72 hours (72h ago, onward)    None                Plan:  1.  Start vancomycin  1250 mg IV q24h.   2.  Goal Trough Level: 15-20 mg/L   3.  Pharmacy will check trough levels as appropriate in 1-3 Days.    4. Serum creatinine levels will be ordered daily for the first week of therapy and at least twice weekly for subsequent weeks.    5. [place]field method utilized to dose vancomycin therapy: Method 1    Tracy Estes [facility]

## 2019-09-14 NOTE — PROGRESS NOTES
Pt disoriented. VSS on 2 LNC. MAP goal >65, needing pressors at times. LS diminished. Adequate urine output. NPO. Will cont to closely monitor.

## 2019-09-14 NOTE — PLAN OF CARE
[facility]   Intensive Care Unit   Nursing Note    Vital signs stable during the day.  Right femoral A-line and central line discontinued. PERRL.  Moves all extremities. Pt is on one liter NC O2. Labs noted.  Updated POA Shayla over the phone. Continue to monitor closely.      ROUTINE IP LABS (Last four results)  BMP  Recent Labs   Lab 09/14/19  0440 09/13/19  0420 09/12/19  1432 09/09/19  0557    135 134 131*   POTASSIUM 3.2* 3.6 4.0 4.0   CHLORIDE 107 107 103 98   RONALD 8.0* 7.6* 8.2* 8.3*   CO2 22 21 23 30   BUN 37* 29 34* 19   CR 0.70 0.82 1.09 0.77   * 111* 102* 101*       Paras Pearson RN

## 2019-09-14 NOTE — PROGRESS NOTES
"Critical Care  Note      09/14/2019    Name: Elie Marcano MRN#: [id number]   Age: 78 year old YOB: 1940     Hsptl Day# 2  ICU DAY # 0                 Problem List:   Active Problems:    Septic shock (H)           Summary/Hospital Course:     Betito is an 78yoM with PMH dementia, HTN, HLD, BPH, hypothyroid, recent operative repair of hip fracture, admitted to the [facility] ICU for septic shock. Staff of his [facility] nursing home called EMS because he had a fever and had removed his own reddy cath. EMS noted him to be hypotensive.     Initial ED workup was notable for lactate 4.4, pyuria, Hgb 10.1, alb 2.2, alt 1159, ast 281, , WBC 4.6, Cr 1.09. CXR WNL, ED abd U/S gallstones, blood and urine cultures pending. The patient was appropriately fluid resuscitated and was antibiosed with ceftriaxone 1x, vanc and zosyn. He was persistently hypotensive and pressors were initiated through central venous access in right groin. He was transferred to the ICU for further management of septic shock. He arrived to the unit stuporous following administration of oxycodone and seroquel for central line placement.  His dementia limits history taking.    Of note, pt fell and fractured his hip 9/3 which was operatively repaired at [facility], discharged 3 days prior to the present admission.    History is obtained from chart review as patient is unable to communicate his medical history.    9/13   \" Family update: I had a lengthy discussion with the patient's POA.  She confirmed that the patient is DNR/DNI, and she would like to meet with palliative care to come up with a plan not to hospitalize the patient in the future.  I connected her with palliative care and with the care coordinator in hopes of arranging transfer to [facility] after hospitalization, and possibly to commence hospice care.\"           Interim  History:   - weaned off pressors this AM, moving towards comfort approach per Palliative note. 9/13/2019          " Assessment and plan :     Elie Marcano IS a 78 year old male admitted on 9/12/2019 for septic shock.   I have personally reviewed the daily labs, imaging studies, cultures and discussed the case with referring physician and consulting physicians.     My assessment and plan by system for this patient is as follows:    Neurology/Psychiatry:   1. dementia  2. Pain/analgesia  3. Anxiety  4. Delirium  Plan  - delirium precautions per protocol, minimize lines and tele as appropriate, window shades open during the day  -  APAP 650mg PRN q8h for pain  - cont home celexa --   - haloperidol 2mg IV PRN for agitation    Cardiovascular:   1. Septic shock:  likely 2/2 to urosepsis, s/p appropriate fluid bolus (> 30 ml/kg). Required levophedvasopressin and stress steroids additionally.  Now weaned off pressors.  Per documented discussion of palliative and HC proxyi - once weaned off pressors - move towards comfort focus approach. Will remove lines   2.Rhythm -  NSR  Plan   - follow hemodynamics off support   - tele, vitals per protocol    Pulmonary/Ventilator Management:   1. Airway patent and protecting  2. Hypoxemia:  requiring 2L NC.  In setting of septic shock  Plan  -    Wean oxygen as tolerated    GI and Nutrition :   1. Full diet when mental status permits  2. Transaminitis, acutely elevated compared to 3 days prior. Gallstones on abd u/s but no evidence of obstruction, bili WNL.  Suspect 2/2 shock.  Trend. No escalation of cares at this point  Plan  - follow clinically     Renal/Fluids/Electrolytes:   1 Cr, uop appropriate  Plan  - monitor function and electrolytes as needed with replacement per ICU protocols. - generally avoid nephrotoxic agents such as NSAID, IV contrast unless specifically required  - adjust medications as needed for renal clearance  - I/O        Infectious Disease:   1. Urosepsis, urine gross blood and pyuria, culture pending, lactate decreasing, vanc/zosyn + ceftriaxone x1 E coli and proteus in  urine and blood   Plan  - cont vanc/zosyn, narrow per cultures/sensitivities.     Endocrine:   1. Hypothyroidism, chronic  2. Normal glucose  Plan  - ICU insulin protocol, goal sugar <180  - home levothyroxine      Hematology/Oncology:   1. Leukocytosis - related to septic shock   Plan  - daily CBC     IV/Access:   1. Venous access - triple lumen central line right fem + L AC and R hand PIV  2. Arterial access - R fem a line  Plan  - discontinue central access     ICU Prophylaxis:   1. DVT: heparin/mechanical  2. VAP: not indicated  3. Stress Ulcer: PPI  4. Restraints: Nonviolent soft two point restraints required and necessary for patient safety and continued cares and good effect as patient continues to pull at necessary lines, tubes despite education and distraction. Will readdress daily.   5. Wound care  - R hip, monitor  6. Feeding - full diet, PO  7. Family Update: see below  8. Disposition - to medical floor when off pressors      Key goals for next 24 hours:   1. Pull Lines   2. Continue abx  3. discontinue stress dose steroids.   4. Will continue non critical interventions  Left message with  HC POA to clarify overall goals as anticipated abx would be 2 weeks with + blood cultures              No Known Allergies           Key Medications:       citalopram  10 mg Oral Daily     heparin  5,000 Units Subcutaneous Q8H     hydrocortisone sodium succinate PF  50 mg Intravenous Q6H     levothyroxine  100 mcg Oral QAM AC     piperacillin-tazobactam  4.5 g Intravenous Q6H       norepinephrine Stopped (09/13/19 2100)     sodium chloride 50 mL/hr at 09/13/19 2103     vasopressin (PITRESSIN) infusion ADULT (40 mL) Stopped (09/14/19 0400)        Home Meds             Physical Examination:   Temp:  [97.6  F (36.4  C)-98.4  F (36.9  C)] 97.6  F (36.4  C)  Pulse:  [45-56] 56  Heart Rate:  [42-82] 82  BP: (124-149)/(53-73) 134/67  MAP:  [59 mmHg-95 mmHg] 72 mmHg  Arterial Line BP: ()/(37-58) 114/44  SpO2:  [97 %-100  %] 97 %      Intake/Output Summary (Last 24 hours) at 9/14/2019 1423  Last data filed at 9/14/2019 1000  Gross per 24 hour   Intake 1289.62 ml   Output 1155 ml   Net 134.62 ml       Wt Readings from Last 4 Encounters:   09/14/19 63.3 kg (139 lb 8.8 oz)   09/09/19 59.9 kg (132 lb)   07/23/19 59.9 kg (132 lb)   05/21/19 62.6 kg (138 lb)     Arterial Line BP: ()/(37-58) 114/44  MAP:  [59 mmHg-95 mmHg] 72 mmHg  BP - Mean:  [] 76  No data recorded  No lab results found in last 7 days.    GEN: no acute distress, alert , interactive   HEENT: head ncat, sclera anicteric, OP patent, trachea midline   PULM: unlabored, breath sounds normal, lung sounds clear and equal bilaterally    CV/COR: RRR S1S2, no extra sounds  ABD: soft nontender, no masses  EXT:  No edema,   Warm, onychomycosis   NEURO: moves all 4 extremities symmetrically   SKIN: no obvious rash, r hip wound, sacral prophylactic dressing in place  LINES: clean, dry intact, no dressing interruptions         Data:   All data and imaging reviewed     ROUTINE ICU LABS (Last four results)  CMP  Recent Labs   Lab 09/14/19  0440 09/13/19  0420 09/12/19  1432 09/09/19  0557 09/08/19  0649    135 134 131* 132*   POTASSIUM 3.2* 3.6 4.0 4.0 3.8   CHLORIDE 107 107 103 98 100   CO2 22 21 23 30 29   ANIONGAP 8 7 8 3 3   * 111* 102* 101* 99   BUN 37* 29 34* 19 17   CR 0.70 0.82 1.09 0.77 0.69   GFRESTIMATED 88 83 63 85 89   GFRESTBLACK >90 >90 73 >90 >90   RONALD 8.0* 7.6* 8.2* 8.3* 8.2*   MAG  --  1.7  --   --   --    PHOS 2.1* 3.0  --   --   --    PROTTOTAL  --  5.1* 5.2* 6.0* 5.6*   ALBUMIN  --  2.1* 2.2* 2.7* 2.5*   BILITOTAL  --  0.9 1.0 0.5 0.5   ALKPHOS  --  134 183* 57 51   AST  --  123* 281* 29 20   ALT  --  118* 159* 27 16     CBC  Recent Labs   Lab 09/13/19  0420 09/13/19  0020 09/12/19  1432 09/09/19  0557 09/08/19  0649   WBC 22.2*  --  4.6 4.8 4.3   RBC 2.98*  --  3.07* 3.31* 3.40*   HGB 9.9*  --  10.1* 10.9* 11.2*   HCT 28.6*  --  29.8* 32.2*  33.1*   MCV 96  --  97 97 97   MCH 33.2*  --  32.9 32.9 32.9   MCHC 34.6  --  33.9 33.9 33.8   RDW 14.5  --  14.3 14.1 14.3   * 148* 133* 153 150     INRNo lab results found in last 7 days.  Arterial Blood GasNo lab results found in last 7 days.    All cultures:  Recent Labs   Lab 09/12/19  1434 09/12/19  1430   CULT >100,000 colonies/mL  Escherichia coli  *  >100,000 colonies/mL  Proteus mirabilis  *  Susceptibility testing in progress Cultured on the 1st day of incubation:  Gram negative rods  *  Critical Value/Significant Value, preliminary result only, called to and read back by  Rene Nair Medical Student/April Machado RN [facility] 0634 09.13.19 CF     (Note)  POSITIVE for PROTEUS SPECIES by Verigene multiplex nucleic acid test.  Final identification and antimicrobial susceptibility testing will be  verified by standard methods.    Specimen tested with Verigene multiplex, gram-negative blood culture  nucleic acid test for the following targets: Acinetobacter sp.,  Citrobacter sp., Enterobacter sp., Proteus sp., E. coli, K.  pneumoniae/oxytoca, P. aeruginosa, and the following resistance  markers: CTXM, KPC, NDM, VIM, IMP and OXA.    Critical Value/Significant Value called to and read back by KATYA CLINE  RN @0918 9/13/19. SCG      Cultured on the 1st day of incubation:  Gram negative rods  *  Critical Value/Significant Value, preliminary result only, called to and read back by  Rene Nair Medical Student/April Machado RN [facility] 0635 09.13.19 CF       Recent Results (from the past 24 hour(s))   XR Chest 2 Views    Narrative    CHEST TWO VIEWS 9/12/2019 2:55 PM     HISTORY:  Fever.    COMPARISON: 9/3/2019    FINDINGS: The lungs are hyperexpanded but clear. No pneumothorax or  pleural effusion. Heart size similar to prior.      Impression    IMPRESSION: No radiographic evidence of acute chest abnormality.     ANISHA YEBOAH MD   Abdomen US, limited (RUQ only)    Narrative    ULTRASOUND  ABDOMEN LIMITED  9/12/2019 4:54 PM     HISTORY: Elevated LFTs, fever, as above.    COMPARISON: None.    FINDINGS:  Liver is unremarkable in echogenicity without focal  lesions. Gallbladder demonstrates cholelithiasis without gallbladder  wall thickening. Possible pericholecystic fluid. Extrahepatic bile  duct is normal in diameter. Pancreas is normal where visualized. Right  kidney is normal in size. There is no hydronephrosis.      Impression    IMPRESSION:    1. Cholelithiasis without evidence for cholecystitis.   2. Possible complex fluid around the gallbladder which is nonspecific.  Even though there is no gallbladder wall thickening, cholecystitis  would be difficult to exclude in this setting.    GABRIELLA BLANCAS MD                                Billing: L3    Omid Flannery MD

## 2019-09-14 NOTE — PLAN OF CARE
[facility]   Intensive Care Unit   Nursing Note    Vital signs stable during the day on vaso and levo drips.  PERRL.  Moves all extremities.  Pt does not follow commands.  Pt is confused and oriented to self only.  Pt does not remember his birthday.  Labs noted.  SONIA Mcguire has been at his bedside today and is working a hospice bed outside the hospital. Continue to monitor closely.      ROUTINE IP LABS (Last four results)  BMP  Recent Labs   Lab 09/13/19  0420 09/12/19  1432 09/09/19  0557 09/08/19  0649    134 131* 132*   POTASSIUM 3.6 4.0 4.0 3.8   CHLORIDE 107 103 98 100   RONALD 7.6* 8.2* 8.3* 8.2*   CO2 21 23 30 29   BUN 29 34* 19 17   CR 0.82 1.09 0.77 0.69   * 102* 101* 99     CBC  Recent Labs   Lab 09/13/19  0420 09/13/19  0020 09/12/19 1432 09/09/19  0557 09/08/19  0649   WBC 22.2*  --  4.6 4.8 4.3   RBC 2.98*  --  3.07* 3.31* 3.40*   HGB 9.9*  --  10.1* 10.9* 11.2*   HCT 28.6*  --  29.8* 32.2* 33.1*   MCV 96  --  97 97 97   MCH 33.2*  --  32.9 32.9 32.9   MCHC 34.6  --  33.9 33.9 33.8   RDW 14.5  --  14.3 14.1 14.3   * 148* 133* 153 150       Paras Pearson RN

## 2019-09-14 NOTE — PROGRESS NOTES
Restraints were removed because patient's arterial and central lines are discontinued.  Pt was switched to mitt's to protect PIV, thus meeting discontinuation criteria.

## 2019-09-14 NOTE — PROGRESS NOTES
Spiritual Health    SH visited Pt per request to meet with Power of . POA was not present but SH met with Pt. Pt is coping well and was grateful for a visit. Pt has trouble hearing and there were some challenges with communication.     SH listened and provided support.     Pt is coping well.     SH will be available as needed.     Paola Simmons  Chaplain Resident

## 2019-09-15 NOTE — SAFE
Very confused, agitated and restless. Was calm the first few hours of this shift. Ripped off his PIV and attempting many times to pull out the reddy catheter and craw out of the bed. Totally disoriented again. Haldol given. Bed alarm on. New PIV inserted, increased presence at bedside for redirections, reminders and reorientation.

## 2019-09-15 NOTE — PROGRESS NOTES
"SW:    D/A: SW called [facility] to clarify discharge plan SW thought it was best to speak to [facility] Shayla to further discuss discharge details. Shayla reports he is first on the wait list at [place], [place]. Lainey Anthony is the contact at [facility], phone number # [phone]. KENNETH asked if a referral has already been made which was very unclear Shayla kept stating \"oh no he's for sure going there\".  Once pt has a facility to discharge to- [facility]. [facility] will meet with pt and [facility] at facility to sign on to hospice. SW left message with lainey to confirm all these details.  P: SW following.    MARCO Ribeiro     "

## 2019-09-15 NOTE — PLAN OF CARE
Patient is a transfer from ICU at approximately 0900 this morning. Patient is alert to self only (hx of dementia), lethargic and calm. VSS on RA except bradycardic. Denied pain. LS diminished. NPO. Morning meds held due to patients LOC. PO swabs provided. BS hypoactive. Incontinent of bowel. BMx1, smear/soft/brown. Coccyx pink, blanchable, and intact - mepilex changed, CDI. Bedrest. Turn/repo q2h. R hip staples KRISTINA from recent surgery. R groin angiogram dressing with small amount of dried serosanguinous drainage. R foot +2, L foot +1. Carlos catheter patent with cloudy, yellow urine output. R PIV infusing NS at 50mL/hr, IV site wrapped in coban. Mitts in place. Plan to continue with IV abx. Nursing to continue to monitor.

## 2019-09-15 NOTE — PROGRESS NOTES
ICU Brief    Patient weaned off pressors 9/14. Mild agitation overngith. Discussed with POA yesterday confrimed comfort approach and no escalation of cares. Request continuing abx given bacteremia , hopefully able to transition to PO pending sensitivities but overall goal remains comfort. Transfer ordered placed    Omid Flannery MD

## 2019-09-16 NOTE — PROVIDER NOTIFICATION
MD Notification    Notified Person: MD    Notified Person Name: Jaspreet    Notification Date/Time: 9/15/19 2034    Notification Interaction: telephone page    Purpose of Notification: Critical potassium level of 2.6, no replacement protocol ordered.    Orders Received: K replacement protocol orders placed, as well as Mag protocol, recheck mag    Comments:

## 2019-09-16 NOTE — PLAN OF CARE
Alert to self. VSS ex bradycardic. LS diminished. +BS, +flatus, -bm. Carlos patent, borderline urine output. Calm during shift, a little agitated with repositioning. K+ replaced, recheck in. IVF. NPO. Oral cares done. Assist x2. Possible discharge today.

## 2019-09-16 NOTE — CONSULTS
KENNETH Discharge Note:    D/I:  KENNETH received call from Lainey with [facility] ([phone]) who stated that they had a bed available for patient to discharge to their facility today.  Lainey requesting that hospice deliver hospital bed, air overlay mattress and no side rails. KENNETH placed call to Mayda with [facility] ([phone]) who stated that they would meet patient and HC Agent out at the facility and requested that ride be set up around 13:00 for today. Discussed equipment needed at facility.  Mayda stated that they will have all equipment delivered and will arrange for all medications.  They would like to have discharge orders and med list faxed to [phone] as soon as available.     KENNETH placed call to [facility] to arrange for discharge stretcher transport for today at 13:00.  Updated Lainey with [facility], Mayda with [facility] and called Shayla, patient's Health Care Agent ([phone]) to update and she is in agreement with discharge plan.  Discussed transportation costs (and that we can not guarantee Insurance coverage) with Shayla and she is in understanding. Paged physician to update on discharge plan.     Addendum: Completed PCS form, Faxed to [facility] and provided to [facility]. Faxed orders to [facility].  Per Rasheeda with [facility], they do not need any scripts faxed over.     Addendum: Received call from Mayda with [facility] and they received the orders and will be meeting with family at 13:30 today at the [facility].     P: KENNETH will continue to assist and follow for discharge.    Hamlet Hernandez, ANGEL, LGSW

## 2019-09-16 NOTE — PROGRESS NOTES
Spiritual Health    SH attempted to visit several times but Pt was sleeping during each attempted visit.     SH will make a follow up visit tomorrow 9/17/19    Paola Simmons  Chaplain Resident

## 2019-09-16 NOTE — PROGRESS NOTES
Cross Cover:  Notified that patient's potassium was 2.6 on recheck this evening. Added potassium protocol along with magnesium recheck (was low 2 days ago) and magnesium protocol.   Communicated these orders with nurse, Emy Rinaldi, DO

## 2019-09-16 NOTE — PLAN OF CARE
Discharging with hospice care. Did not need to finish K replacement per Dr. Heath. Bag of belongings and packet sent with [facility] transport. Breanna patent, PIV removed.

## 2019-09-16 NOTE — DISCHARGE SUMMARY
[facility]  Discharge Summary        Elie Marcano MRN# [id number]   YOB: 1940 Age: 78 year old     Date of Admission:  9/12/2019  Date of Discharge:  9/16/2019  Admitting Physician:  Price Burton MD  Discharge Physician: Parminder Heath MD  Discharging Service: Hospitalist     Primary Provider:  Debbie Plata  Primary Care Physician Phone Number: [phone]         Discharge Diagnoses/Problem Oriented Hospital Course (Providers):    Elie Marcano was admitted on 9/12/2019 by Price Burton MD and I would refer you to their history and physical.  The following problems were addressed during his hospitalization:    Elie Marcano is a 78 year old M with past medical history of Alzheimer's dementia, BPH, hypertension, hyperlipidemia, Graves' disease who presented from his nursing home with fever.  He was noted to be hypotensive in the emergency department, required central line placement and vasopressors and he was admitted to the intensive care unit for treatment of septic shock.  Blood cultures positive for E. coli and Proteus mirabilis with urinary source as urine culture growing these organisms as well.  Palliative care was consulted.  Goals of care were discussed with his power of , she elected to focus primarily on comfort with no escalation of care, although continuing IV fluids and IV antibiotics for now.  Transferred to hospitalist service on 9/12/2019      1. Septic shock secondary tocatheter associated urinary tract infection with bacteremia, E. coli and Proteus mirabilis   2. Advanced dementia  3. BPH with chronic Carlos catheter  4. Hypothyroidism  5. Anxiety  6. Advanced care planning     -  consulted for hospice/discharge planning  - Palliative care following  - Discussed plan with power of   - Continue piperacillin-tazobactam IV, plan to change to levaquin po for 4 more days to complete a 7 day course.     - Discussed option of  regular diet for comfort, accepting risk of aspiration, power of  wishes to continue NPO for now but diet as tolerated at discharge             Code Status:      DNR / DNI         Important Results:      NAD, denies any pain or discomfort  HEENT normal  PULM CTA  CV RRR  GI SOFT +BS  MS NO EDEMA  NEURO AWAKE AND ABLE TO ANSWER< MOVES ALL 4 EXT  DERM WARM AND DRY  PSYCH MOOD AND AFFECT STABLE         Pending Results:        Unresulted Labs Ordered in the Past 30 Days of this Admission     Date and Time Order Name Status Description    9/12/2019 1423 Blood culture Preliminary                Discharge Instructions and Follow-Up:      Follow-up Appointments     Follow Up and recommended labs and tests      Follow up with hosp[facility]                  Discharge Disposition:      Discharged to nursing home         Discharge Medications:        Current Discharge Medication List      START taking these medications    Details   !! acetaminophen (TYLENOL) 325 MG tablet Take 1-2 tablets (325-650 mg) by mouth every 4 hours as needed for mild pain or fever  Qty: 100 tablet, Refills: 1    Associated Diagnoses: End of life care      acetaminophen (TYLENOL) 650 MG suppository Place 1 suppository (650 mg) rectally every 4 hours as needed for fever or mild pain (Do not exceed 4000 mg total acetaminophen per day.) Unwrap prior to insertion.  Qty: 12 suppository, Refills: 1    Associated Diagnoses: End of life care      atropine 1 % ophthalmic solution Take 2-4 drops by mouth, place under tongue or place inside cheek every 2 hours as needed for other (terminal respiratory secretions) Not for ophthalmic use.  Qty: 5 mL, Refills: 1    Associated Diagnoses: End of life care      bisacodyl (DULCOLAX) 10 MG suppository Unwrap and insert 1 suppository rectally twice daily as needed for constipation.  Qty: 12 suppository, Refills: 1    Associated Diagnoses: End of life care      haloperidol (HALDOL) 2 MG/ML (HIGH CONC)  solution Take 0.25-0.5 mLs (0.5-1 mg) by mouth, place under tongue or insert rectally every 6 hours as needed for agitation (nausea)  Qty: 30 mL, Refills: 1    Associated Diagnoses: End of life care      HYDROmorphone, HIGH CONC, (DILAUDID) 10 mg/mL LIQD oral Take 0.1-0.2 mLs (1-2 mg) by mouth or place under tongue every 2 hours as needed for moderate to severe pain (and/or  shortness of breath)  Qty: 30 mL, Refills: 0    Associated Diagnoses: End of life care      levofloxacin (LEVAQUIN) 250 MG tablet Take 1 tablet (250 mg) by mouth daily  Qty: 4 tablet    Associated Diagnoses: Septic shock (H)      LORazepam (ATIVAN) 2 MG/ML (HIGH CONC) solution Take 0.125-0.25 mLs (0.25-0.5 mg) by mouth, place under tongue or insert rectally every 4 hours as needed for anxiety (restlessness)  Qty: 30 mL, Refills: 1    Associated Diagnoses: End of life care      MEDICATION INSTRUCTION If care facility cannot accept or use ranges, facility is instructed to use lower end of dosing range    Associated Diagnoses: End of life care       !! - Potential duplicate medications found. Please discuss with provider.      CONTINUE these medications which have NOT CHANGED    Details   citalopram (CELEXA) 10 MG tablet TAKE 1 TABLET BY MOUTH ONCE DAILY  Qty: 28 tablet, Refills: 98    Associated Diagnoses: Other depression      fluticasone (FLONASE) 50 MCG/ACT nasal spray INHALE 1 SPRAY IN EACH NOSTRIL DAILY  Qty: 16 g, Refills: 98    Associated Diagnoses: Chronic rhinitis      latanoprost (XALATAN) 0.005 % ophthalmic solution INSTILL ONE DROP IN EACH EYE EVERY NIGHT AT BEDTIME  Qty: 2.5 mL, Refills: 97    Associated Diagnoses: Glaucoma suspect, bilateral      levothyroxine (SYNTHROID/LEVOTHROID) 100 MCG tablet TAKE 1 TABLET BY MOUTH ONCE DAILY  Qty: 31 tablet, Refills: 98    Associated Diagnoses: Other specified hypothyroidism      !! MAPAP 325 MG tablet TAKE TWO TABLETS (650MG) BY MOUTH FOUR TIMES A DAY AS NEEDED FOR PAIN  Qty: 180 tablet,  Refills: 98    Associated Diagnoses: Osteoarthritis of shoulder, unspecified laterality, unspecified osteoarthritis type      mirtazapine (REMERON) 15 MG tablet Take 15 mg by mouth At Bedtime      multivitamin w/minerals (THERA-VIT-M) tablet Take 1 tablet by mouth At Bedtime      polyethylene glycol (MIRALAX/GLYCOLAX) packet Take 17 g by mouth daily as needed for constipation    Associated Diagnoses: Hip fracture, right, closed, initial encounter (H)      QUEtiapine (SEROQUEL) 25 MG tablet Take 1 tablet (25 mg) by mouth 2 times daily as needed (agitation, restlessness)    Associated Diagnoses: Early onset Alzheimer's disease with behavioral disturbance      senna-docusate (SENOKOT-S/PERICOLACE) 8.6-50 MG tablet Take 2 tablets by mouth 2 times daily as needed for constipation    Associated Diagnoses: Hip fracture, right, closed, initial encounter (H)      tamsulosin (FLOMAX) 0.4 MG capsule TAKE 1 CAPSULE BY MOUTH ONCE DAILY  Qty: 31 capsule, Refills: 98    Associated Diagnoses: Benign prostatic hyperplasia, unspecified whether lower urinary tract symptoms present       !! - Potential duplicate medications found. Please discuss with provider.      STOP taking these medications       camphor-menthol (DERMASARRA) 0.5-0.5 % external lotion Comments:   Reason for Stopping:         oxyCODONE (ROXICODONE) 5 MG tablet Comments:   Reason for Stopping:         VITAMIN D3 1000 units tablet Comments:   Reason for Stopping:                    Allergies:       No Known Allergies         Consultations This Hospital Stay:      Consultation during this admission received from critical care          Discharge Orders      After Care Instructions     Activity - Up with nursing assistance      Advance Diet as Tolerated      Follow this diet upon discharge: Orders Placed This Encounter     Diet as tolerated         General info for SNF      Length of Stay Estimate: Long Term Care  Condition at Discharge: Terminal  Level of care:board and  care  Rehabilitation Potential: Poor  Admission H&P remains valid and up-to-date: Yes  Recent Chemotherapy: N/A  Use Nursing Home Standing Orders: Yes         Mantoux instructions      Give two-step Mantoux (PPD) Per Facility Policy Yes         Oxygen - Nasal cannula      2-3 Lpm by nasal cannula to keep O2 sats 92% or greater.                    Discharge Time:       Greater than 30 minutes.        Image Results From This Hospital Stay (For Non-EPIC Providers):        Results for orders placed or performed during the hospital encounter of 09/12/19   XR Chest 2 Views    Narrative    CHEST TWO VIEWS 9/12/2019 2:55 PM     HISTORY:  Fever.    COMPARISON: 9/3/2019    FINDINGS: The lungs are hyperexpanded but clear. No pneumothorax or  pleural effusion. Heart size similar to prior.      Impression    IMPRESSION: No radiographic evidence of acute chest abnormality.     ANISHA YEBOAH MD   Abdomen US, limited (RUQ only)    Narrative    ULTRASOUND ABDOMEN LIMITED  9/12/2019 4:54 PM     HISTORY: Elevated LFTs, fever, as above.    COMPARISON: None.    FINDINGS:  Liver is unremarkable in echogenicity without focal  lesions. Gallbladder demonstrates cholelithiasis without gallbladder  wall thickening. Possible pericholecystic fluid. Extrahepatic bile  duct is normal in diameter. Pancreas is normal where visualized. Right  kidney is normal in size. There is no hydronephrosis.      Impression    IMPRESSION:    1. Cholelithiasis without evidence for cholecystitis.   2. Possible complex fluid around the gallbladder which is nonspecific.  Even though there is no gallbladder wall thickening, cholecystitis  would be difficult to exclude in this setting.    GABRIELLA BLANCAS MD           Most Recent Lab Results In EPIC (For Non-EPIC Providers):    Most Recent 3 CBC's:  Recent Labs   Lab Test 09/13/19  0420 09/13/19  0020 09/12/19  1432 09/09/19  0557   WBC 22.2*  --  4.6 4.8   HGB 9.9*  --  10.1* 10.9*   MCV 96  --  97 97   * 148*  133* 153      Most Recent 3 BMP's:  Recent Labs   Lab Test 09/16/19  0653 09/15/19  1955 09/14/19  0440 09/13/19  0420 09/12/19  1432   NA  --   --  137 135 134   POTASSIUM 2.9* 2.6* 3.2* 3.6 4.0   CHLORIDE  --   --  107 107 103   CO2  --   --  22 21 23   BUN  --   --  37* 29 34*   CR  --   --  0.70 0.82 1.09   ANIONGAP  --   --  8 7 8   RONALD  --   --  8.0* 7.6* 8.2*   GLC  --   --  125* 111* 102*     Most Recent 3 Troponin's:  Recent Labs   Lab Test 06/11/18  1138 06/06/18  1110   TROPI <0.015 <0.015     Most Recent 3 INR's:  Recent Labs   Lab Test 09/03/19  1912   INR 0.95     Most Recent 2 LFT's:  Recent Labs   Lab Test 09/13/19  0420 09/12/19  1432   * 281*   * 159*   ALKPHOS 134 183*   BILITOTAL 0.9 1.0     Most Recent Cholesterol Panel:  Recent Labs   Lab Test 02/04/16   CHOL 201      HDL 69   TRIG 91     Most Recent 6 Bacteria Isolates From Any Culture (See EPIC Reports for Culture Details):  Recent Labs   Lab Test 09/12/19  1434 09/12/19  1430 09/05/19  0530   CULT >100,000 colonies/mL  Escherichia coli  *  >100,000 colonies/mL  Proteus mirabilis  * Cultured on the 1st day of incubation:  Proteus mirabilis  *  Critical Value/Significant Value, preliminary result only, called to and read back by  Rene Nair Medical Student/April Machado RN [facility] 0634 09.13.19 CF     Cultured on the 1st day of incubation:  Escherichia coli  *  Critical Value/Significant Value, preliminary result only, called to and read back by  Paras Pearson RN on SICU on 9/14/2019 @1415, tlk    (Note)  POSITIVE for PROTEUS SPECIES by [employer] multiplex nucleic acid test.  Final identification and antimicrobial susceptibility testing will be  verified by standard methods.    Specimen tested with Verigene multiplex, gram-negative blood culture  nucleic acid test for the following targets: Acinetobacter sp.,  Citrobacter sp., Enterobacter sp., Proteus sp., E. coli, K.  pneumoniae/oxytoca, P. aeruginosa, and the  following resistance  markers: CTXM, KPC, NDM, VIM, IMP and OXA.    Critical Value/Significant Value called to and read back by KATYA CLINE  RN @0918 9/13/19. SCG      Cultured on the 1st day of incubation:  Proteus mirabilis  *  Critical Value/Significant Value, preliminary result only, called to and read back by  Rene Nair Medical Student/April Machado RN [facility] 0635 09.13.19 CF    Susceptibility testing done on previous specimen No growth     Most Recent TSH, T4 and HgbA1c:  Recent Labs   Lab Test 01/10/19 11/28/18   TSH 1.00* 0.47   A1C  --  5.8*

## 2019-09-16 NOTE — PLAN OF CARE
Pt alert to self only, although hard to assess orientation completely.  Inconsistent with answering questions/following commands.  VSS on RA except BP elevated.  Denies pain.  Mildly agitated with cares, some swearing and swinging at nurse with hands with turns and brief changes.  Carlos with adequate clear yellow output, several small smears of BM this shift in brief.  Turned/repo Q2.  Mepilex removed from coccyx due to inability to stay clean-mild blanchable redness to coccyx.  K 2.6, IV replacement protocol started.  Plan is to discharge possibly tomorrow to [facility] pending bed availability.  Nursing continue to monitor.

## 2019-09-17 NOTE — PROGRESS NOTES
TRANSITIONS OF CARE (PATIENCE) LOG   PATIENCE tasks should be completed by the CC within one (1) business day of notification of each transition. Follow up contact with member is required after return to their usual care setting.  Note:  If CC finds out about the transitions fifteen (15) days or more after the member has returned to their usual care setting, no PATIENCE log is needed. However, the CC should check in with the member to discuss the transition process, any changes needed to the care plan and document it in a case note.    Member Name:  Elie Marcano MCO Name:  [facility] MCO/Health Plan Member ID#: [id number]   Product: [facility] Medicare  Care Coordinator Contact:  Cordelia Calvillo MA, [facility] Agency/County/Care System: [facility]   Transition Communication Actions from Care Management Contact   Transition #1   Notification Date: 9-10-19 Transition Date:   9-3-19 Transition From: Assisted Living, [facility]      Is this the member s usual care setting?               yes Transition To: [facility]    Transition Type:  Unplanned  Reason for Admission/Comments:  Member had unwitnessed fall      Shared CC contact info, care plan/services with receiving setting--Date completed: AL did upon admission to hospital     Notified PCP of transition--Date completed:  9-9-19     via  EMR   Transition #2   Transition #3  (if applicable)   Notification Date: 9-10-19         Transition To:  Skilled Nursing Facility, [facility]   Transition Date: 9-9-19     Transition Type:    Planned  Notified PCP -- Date completed: 9-10-19              Shared CC contact info, care plan/services with receiving setting or, if applicable, home care agency--Date completed:  Hospital did at admission to TCU   *Complete additional tasks below, if this transition is a return to usual care setting.      Comments:      Notification Date:  9-17-19  Transition To:  [facility]   Transition Date:   9-12-19            Transition Type:    Unplanned  Notified PCP--Date completed: 9-12-19         -Shared CC contact- info, care plan/services with receiving setting or, if applicable, home care agency--Date completed: 9-12-19      *Complete additional tasks below, if this transition is a return to usual care setting.      Comments:  Member was transferred to ED from TCU due to concern off being septic after pulling out his catheter       *Complete tasks below when the member is discharging TO their usual care setting within one (1) business day of notification.  For situations where the Care Coordinator is notified of the discharge prior to the date of discharge, the Care Coordinator must follow up with the member or designated representative to confirm that discharge actually occurred and discuss required PATIENCE tasks as outlined in the PATIENCE Instructions.  (This includes situations where it may be a  new  usual care setting for the member. (i.e., a community member who decides upon permanent nursing home placement following hospitalization and rehab).    Date completed:   Communicated with member or their designated representative about the following:  care transition process; about changes to the member s health status; plan of care updates; education about transitions and how to prevent unplanned transitions/readmissions  Four Pillars for Optimal Transition:    Check  Yes  - if the member, family member and/or SNF/facility staff manages the following:    If  No  provide explanation in the comments section.          []  Yes     []  No     Does the member have a follow-up appointment scheduled with primary care or specialist? (Mental health hospitalizations--the appt. should be w/in 7 days)   []  Yes     []  No     Can the member manage their medications or is there a system in place to manage medications (e.g. home care set-up)?         []  Yes     []  No     Can the member verbalize warning signs and symptoms to watch for and how to  respond?         []  Yes     []  No     Does the member use a Personal Health Care Record?  Check  Yes  if visit summary, discharge summary, and/or healthcare summary are being used as a PHR.                                                                                                                                                                                    [] Yes      [] No      Have you updated the member s care plan?  If  No  provide explanation in comments.   Comments:  See second PATIENCE - member transferred from hospital to [facility]

## 2019-09-17 NOTE — PROGRESS NOTES
9-17-19   [facility] Assessment - [facility] (Assisted Living)    Member identified and opened to case management per [facility] protocol.    PCP: Debbie Plata  PCC: [facility]  Living arrangement:  Assisted Living apartment        Utilization History (last 12 months): ED Visits, inpatient, TCU    Medical History:   Past Medical History:   Diagnosis Date     Alzheimer disease      BPH (benign prostatic hyperplasia)      Chronic sinusitis      Cognitive impairment      Elevated PSA      Hyperlipidemia      Hypertension      Syncope      Thyroid disease     Graves disease, Multinodular goiter     Vitamin D deficiency      Weight loss        3 or 6 month follow up:  EPIC notes reviewed, call placed to AL facility for updates as needed.  UFS POC  initiated, goals reviewed and updated.    Care Coordination Initial Assessment    Identity verified    Utilization:   ED visits in last year: 2  Hospital Admits in last year: 2  Member was discharge from last hospital stay to [facility] with HO services     [facility] ([phone])    [facility] ([phone])        Current Medical Health Concerns:   1. Septic shock secondary tocatheter associated urinary tract infection with bacteremia, E. coli and Proteus mirabilis   2. Advanced dementia  3. BPH with chronic Carlos catheter  4. Hypothyroidism  5. Anxiety    Plan: Member will received HO services   Cordelia Calvillo MA [facility] Care Coordinator   [phone]            TRANSITIONS OF CARE (PATIENCE) LOG   PATIENCE tasks should be completed by the CC within one (1) business day of notification of each transition. Follow up contact with member is required after return to their usual care setting.  Note:  If CC finds out about the transitions fifteen (15) days or more after the member has returned to their usual care setting, no PATIENCE log is needed. However, the CC should check in with the member to discuss the  transition process, any changes needed to the care plan and document it in a case note.    Member Name:  Elie Marcano [facility] Name:  [facility] MCO/Health Plan Member ID#: [id number]    Product: [facility] Medicare  Care Coordinator Contact:  Cordelia Calvillo MA, [facility] Agency/[facility]/Care System: [facility]   Transition Communication Actions from Care Management Contact   Transition #1   Notification Date: 9-17-19 Transition Date:   9-16-19 Transition From: [facility]      Is this the member s usual care setting?               no Transition To: [facility]    Transition Type:  Planned  Reason for Admission/Comments:       Shared CC contact info, care plan/services with receiving setting--Date completed:  Hospital did at time of discharge    Notified PCP of transition--Date completed:  9-17-19     via  EMR   Transition #2   Transition #3  (if applicable)   Notification Date:          Transition To:    Transition Date:      Transition Type:      Notified PCP -- Date completed:               Shared CC contact info, care plan/services with receiving setting or, if applicable, home care agency--Date completed:    *Complete additional tasks below, if this transition is a return to usual care setting.      Comments:      Notification Date:          Transition To:    Transition Date:              Transition Type:      Notified PCP--Date completed:          Shared CC contact info, care plan/services with receiving setting or, if applicable, home care agency--Date completed:       *Complete additional tasks below, if this transition is a return to usual care setting.      Comments:       *Complete tasks below when the member is discharging TO their usual care setting within one (1) business day of notification.  For situations where the Care Coordinator is notified of the discharge prior to the date of discharge, the Care Coordinator must follow up with the member or designated  representative to confirm that discharge actually occurred and discuss required PATIENCE tasks as outlined in the PATIENCE Instructions.  (This includes situations where it may be a  new  usual care setting for the member. (i.e., a community member who decides upon permanent nursing home placement following hospitalization and rehab).    Date completed:   Communicated with member or their designated representative about the following:  care transition process; about changes to the member s health status; plan of care updates; education about transitions and how to prevent unplanned transitions/readmissions  Four Pillars for Optimal Transition:    Check  Yes  - if the member, family member and/or SNF/facility staff manages the following:    If  No  provide explanation in the comments section.          []  Yes     []  No     Does the member have a follow-up appointment scheduled with primary care or specialist? (Mental health hospitalizations--the appt. should be w/in 7 days)   []  Yes     []  No     Can the member manage their medications or is there a system in place to manage medications (e.g. home care set-up)?         []  Yes     []  No     Can the member verbalize warning signs and symptoms to watch for and how to respond?         []  Yes     []  No     Does the member use a Personal Health Care Record?  Check  Yes  if visit summary, discharge summary, and/or healthcare summary are being used as a PHR.                                                                                                                                                                                    [] Yes      [] No      Have you updated the member s care plan?  If  No  provide explanation in comments.   Comments:

## 2019-09-18 NOTE — PROGRESS NOTES
Update on blood culture    Notified by lab of blood culture from 9/12 also growing lactose fermenting gram negative rods (final ID pending), previously growing E coli and Proteus, pansensitive and was discharged on Levaquin    - await further identification

## 2019-11-08 ENCOUNTER — PATIENT OUTREACH (OUTPATIENT)
Dept: GERIATRIC MEDICINE | Facility: CLINIC | Age: 79
End: 2019-11-08

## 2019-11-08 NOTE — PROGRESS NOTES
Date of Exit:  9-30-19  Reason for Exit: Other:  Death   POC updated and goals resolved  Death notification faxed to [facility] and [facility] notified of disenrollment.  Cordelia Calvillo MA [facility] Coordinator   [phone]
